# Patient Record
Sex: FEMALE | Race: WHITE | Employment: UNEMPLOYED | URBAN - METROPOLITAN AREA
[De-identification: names, ages, dates, MRNs, and addresses within clinical notes are randomized per-mention and may not be internally consistent; named-entity substitution may affect disease eponyms.]

---

## 2017-01-19 ENCOUNTER — OFFICE VISIT (OUTPATIENT)
Dept: FAMILY MEDICINE CLINIC | Age: 42
End: 2017-01-19

## 2017-01-19 ENCOUNTER — HOSPITAL ENCOUNTER (OUTPATIENT)
Dept: MAMMOGRAPHY | Age: 42
Discharge: HOME | End: 2017-01-19
Attending: FAMILY MEDICINE | Admitting: FAMILY MEDICINE

## 2017-01-19 VITALS
WEIGHT: 226.2 LBS | RESPIRATION RATE: 20 BRPM | DIASTOLIC BLOOD PRESSURE: 80 MMHG | BODY MASS INDEX: 35.43 KG/M2 | SYSTOLIC BLOOD PRESSURE: 110 MMHG | HEART RATE: 78 BPM | TEMPERATURE: 99.3 F

## 2017-01-19 DIAGNOSIS — F43.22 ADJUSTMENT DISORDER WITH ANXIETY: Primary | ICD-10-CM

## 2017-01-19 DIAGNOSIS — F51.04 PSYCHOPHYSIOLOGICAL INSOMNIA: ICD-10-CM

## 2017-01-19 DIAGNOSIS — Z12.39 BREAST CANCER SCREENING: ICD-10-CM

## 2017-01-19 DIAGNOSIS — Z72.0 TOBACCO USE: ICD-10-CM

## 2017-01-19 DIAGNOSIS — Z12.31 VISIT FOR SCREENING MAMMOGRAM: ICD-10-CM

## 2017-01-19 PROCEDURE — 99213 OFFICE O/P EST LOW 20 MIN: CPT | Performed by: FAMILY MEDICINE

## 2017-01-19 RX ORDER — CLONIDINE HYDROCHLORIDE 0.2 MG/1
TABLET ORAL
Qty: 90 TABLET | Refills: 3 | Status: SHIPPED | OUTPATIENT
Start: 2017-01-19 | End: 2017-04-24 | Stop reason: DRUGHIGH

## 2017-01-19 RX ORDER — BUPROPION HYDROCHLORIDE 300 MG/1
300 TABLET ORAL EVERY MORNING
Qty: 90 TABLET | Refills: 3 | Status: SHIPPED | OUTPATIENT
Start: 2017-01-19 | End: 2017-10-23 | Stop reason: DRUGHIGH

## 2017-01-19 RX ORDER — DIAZEPAM 5 MG/1
TABLET ORAL
Qty: 30 TABLET | Refills: 1 | Status: SHIPPED | OUTPATIENT
Start: 2017-01-19 | End: 2017-04-24 | Stop reason: SDUPTHER

## 2017-02-03 DIAGNOSIS — F51.04 PSYCHOPHYSIOLOGICAL INSOMNIA: ICD-10-CM

## 2017-02-07 RX ORDER — ZALEPLON 10 MG/1
CAPSULE ORAL
Qty: 30 CAPSULE | Refills: 1 | Status: SHIPPED | OUTPATIENT
Start: 2017-02-07 | End: 2017-03-30 | Stop reason: SDUPTHER

## 2017-03-30 DIAGNOSIS — F51.04 PSYCHOPHYSIOLOGICAL INSOMNIA: ICD-10-CM

## 2017-03-31 RX ORDER — ZALEPLON 10 MG/1
CAPSULE ORAL
Qty: 30 CAPSULE | Refills: 0 | Status: SHIPPED | OUTPATIENT
Start: 2017-03-31 | End: 2017-07-24 | Stop reason: ALTCHOICE

## 2017-04-17 ENCOUNTER — OFFICE VISIT (OUTPATIENT)
Dept: FAMILY MEDICINE CLINIC | Age: 42
End: 2017-04-17

## 2017-04-17 VITALS
HEART RATE: 77 BPM | WEIGHT: 220.4 LBS | RESPIRATION RATE: 18 BRPM | DIASTOLIC BLOOD PRESSURE: 98 MMHG | BODY MASS INDEX: 34.52 KG/M2 | SYSTOLIC BLOOD PRESSURE: 139 MMHG | TEMPERATURE: 99.3 F

## 2017-04-17 DIAGNOSIS — J30.1 SEASONAL ALLERGIC RHINITIS DUE TO POLLEN: Primary | ICD-10-CM

## 2017-04-17 PROCEDURE — 99213 OFFICE O/P EST LOW 20 MIN: CPT | Performed by: FAMILY MEDICINE

## 2017-04-17 RX ORDER — MELOXICAM 15 MG/1
15 TABLET ORAL DAILY
COMMUNITY
End: 2017-07-10 | Stop reason: SDUPTHER

## 2017-04-17 RX ORDER — TRIAMCINOLONE ACETONIDE 55 UG/1
2 SPRAY, METERED NASAL 2 TIMES DAILY
Qty: 1 INHALER | Refills: 5 | Status: SHIPPED | OUTPATIENT
Start: 2017-04-17 | End: 2017-07-24 | Stop reason: ALTCHOICE

## 2017-04-24 ENCOUNTER — OFFICE VISIT (OUTPATIENT)
Dept: FAMILY MEDICINE CLINIC | Age: 42
End: 2017-04-24

## 2017-04-24 VITALS
DIASTOLIC BLOOD PRESSURE: 113 MMHG | OXYGEN SATURATION: 96 % | RESPIRATION RATE: 12 BRPM | TEMPERATURE: 99.1 F | SYSTOLIC BLOOD PRESSURE: 153 MMHG | HEART RATE: 83 BPM

## 2017-04-24 DIAGNOSIS — D22.71: ICD-10-CM

## 2017-04-24 DIAGNOSIS — Z72.0 TOBACCO USE: ICD-10-CM

## 2017-04-24 DIAGNOSIS — F43.22 ADJUSTMENT DISORDER WITH ANXIETY: Primary | ICD-10-CM

## 2017-04-24 DIAGNOSIS — F51.04 PSYCHOPHYSIOLOGICAL INSOMNIA: ICD-10-CM

## 2017-04-24 PROCEDURE — 99213 OFFICE O/P EST LOW 20 MIN: CPT | Performed by: FAMILY MEDICINE

## 2017-04-24 RX ORDER — CLONIDINE HYDROCHLORIDE 0.3 MG/1
0.3 TABLET ORAL EVERY EVENING
Qty: 30 TABLET | Refills: 5 | Status: SHIPPED | OUTPATIENT
Start: 2017-04-24 | End: 2017-09-29 | Stop reason: SDUPTHER

## 2017-04-24 RX ORDER — ZOLPIDEM TARTRATE 5 MG/1
5 TABLET ORAL NIGHTLY PRN
Qty: 30 TABLET | Refills: 2 | Status: SHIPPED | OUTPATIENT
Start: 2017-04-24 | End: 2017-05-17 | Stop reason: SDUPTHER

## 2017-04-24 RX ORDER — DIAZEPAM 5 MG/1
TABLET ORAL
Qty: 30 TABLET | Refills: 2 | Status: SHIPPED | OUTPATIENT
Start: 2017-04-24 | End: 2017-07-24 | Stop reason: SDUPTHER

## 2017-04-24 ASSESSMENT — PATIENT HEALTH QUESTIONNAIRE - PHQ9
SUM OF ALL RESPONSES TO PHQ9 QUESTIONS 1 & 2: 0
1. LITTLE INTEREST OR PLEASURE IN DOING THINGS: 0
SUM OF ALL RESPONSES TO PHQ QUESTIONS 1-9: 0
2. FEELING DOWN, DEPRESSED OR HOPELESS: 0

## 2017-04-26 PROBLEM — F43.22 ADJUSTMENT DISORDER WITH ANXIETY: Status: ACTIVE | Noted: 2017-04-26

## 2017-05-02 ENCOUNTER — TELEPHONE (OUTPATIENT)
Dept: FAMILY MEDICINE CLINIC | Age: 42
End: 2017-05-02

## 2017-05-17 DIAGNOSIS — F51.04 PSYCHOPHYSIOLOGICAL INSOMNIA: ICD-10-CM

## 2017-05-18 RX ORDER — ZOLPIDEM TARTRATE 5 MG/1
5 TABLET ORAL NIGHTLY PRN
Qty: 60 TABLET | Refills: 2 | Status: SHIPPED | OUTPATIENT
Start: 2017-05-18 | End: 2017-06-01

## 2017-06-13 ENCOUNTER — TELEPHONE (OUTPATIENT)
Dept: FAMILY MEDICINE CLINIC | Age: 42
End: 2017-06-13

## 2017-07-10 DIAGNOSIS — G89.29 CHRONIC BILATERAL LOW BACK PAIN WITHOUT SCIATICA: ICD-10-CM

## 2017-07-10 DIAGNOSIS — M54.50 CHRONIC BILATERAL LOW BACK PAIN WITHOUT SCIATICA: ICD-10-CM

## 2017-07-10 DIAGNOSIS — M25.552 ARTHRALGIA OF LEFT HIP: ICD-10-CM

## 2017-07-10 RX ORDER — MELOXICAM 15 MG/1
TABLET ORAL
Qty: 30 TABLET | Refills: 4 | Status: SHIPPED | OUTPATIENT
Start: 2017-07-10 | End: 2017-12-07 | Stop reason: SDUPTHER

## 2017-07-24 ENCOUNTER — OFFICE VISIT (OUTPATIENT)
Dept: FAMILY MEDICINE CLINIC | Age: 42
End: 2017-07-24

## 2017-07-24 VITALS
WEIGHT: 212.4 LBS | DIASTOLIC BLOOD PRESSURE: 83 MMHG | BODY MASS INDEX: 32.19 KG/M2 | SYSTOLIC BLOOD PRESSURE: 126 MMHG | TEMPERATURE: 98.1 F | RESPIRATION RATE: 14 BRPM | HEIGHT: 68 IN | HEART RATE: 79 BPM

## 2017-07-24 DIAGNOSIS — F43.22 ADJUSTMENT DISORDER WITH ANXIETY: ICD-10-CM

## 2017-07-24 DIAGNOSIS — F51.04 PSYCHOPHYSIOLOGICAL INSOMNIA: ICD-10-CM

## 2017-07-24 PROCEDURE — 99213 OFFICE O/P EST LOW 20 MIN: CPT | Performed by: FAMILY MEDICINE

## 2017-07-24 RX ORDER — DIAZEPAM 5 MG/1
TABLET ORAL
Qty: 30 TABLET | Refills: 2 | Status: SHIPPED | OUTPATIENT
Start: 2017-07-24 | End: 2017-10-23 | Stop reason: SDUPTHER

## 2017-07-24 RX ORDER — ZOLPIDEM TARTRATE 5 MG/1
5 TABLET ORAL NIGHTLY PRN
Qty: 60 TABLET | Refills: 2 | Status: SHIPPED | OUTPATIENT
Start: 2017-07-24 | End: 2017-10-23 | Stop reason: SDUPTHER

## 2017-07-24 RX ORDER — ZOLPIDEM TARTRATE 5 MG/1
5 TABLET ORAL NIGHTLY PRN
COMMUNITY
End: 2017-07-24 | Stop reason: SDUPTHER

## 2017-09-29 DIAGNOSIS — F43.22 ADJUSTMENT DISORDER WITH ANXIETY: ICD-10-CM

## 2017-09-29 RX ORDER — CLONIDINE HYDROCHLORIDE 0.3 MG/1
0.3 TABLET ORAL EVERY EVENING
Qty: 30 TABLET | Refills: 5 | Status: SHIPPED | OUTPATIENT
Start: 2017-09-29 | End: 2018-01-17 | Stop reason: SDUPTHER

## 2017-10-23 ENCOUNTER — OFFICE VISIT (OUTPATIENT)
Dept: FAMILY MEDICINE CLINIC | Age: 42
End: 2017-10-23

## 2017-10-23 VITALS
DIASTOLIC BLOOD PRESSURE: 101 MMHG | TEMPERATURE: 98.2 F | BODY MASS INDEX: 32.47 KG/M2 | SYSTOLIC BLOOD PRESSURE: 142 MMHG | HEART RATE: 70 BPM | WEIGHT: 210.4 LBS | RESPIRATION RATE: 16 BRPM

## 2017-10-23 DIAGNOSIS — F51.04 PSYCHOPHYSIOLOGICAL INSOMNIA: ICD-10-CM

## 2017-10-23 DIAGNOSIS — Z72.0 TOBACCO USE: ICD-10-CM

## 2017-10-23 DIAGNOSIS — F43.22 ADJUSTMENT DISORDER WITH ANXIETY: ICD-10-CM

## 2017-10-23 PROCEDURE — 99214 OFFICE O/P EST MOD 30 MIN: CPT | Performed by: FAMILY MEDICINE

## 2017-10-23 RX ORDER — BUPROPION HYDROCHLORIDE 150 MG/1
150 TABLET ORAL EVERY MORNING
Qty: 30 TABLET | Refills: 3 | Status: SHIPPED | OUTPATIENT
Start: 2017-10-23 | End: 2018-01-17 | Stop reason: SINTOL

## 2017-10-23 RX ORDER — DIAZEPAM 5 MG/1
TABLET ORAL
Qty: 30 TABLET | Refills: 2 | Status: SHIPPED | OUTPATIENT
Start: 2017-10-23 | End: 2018-01-17 | Stop reason: SDUPTHER

## 2017-10-23 RX ORDER — BUPROPION HYDROCHLORIDE 300 MG/1
TABLET ORAL
Qty: 90 TABLET | Refills: 3 | Status: SHIPPED | OUTPATIENT
Start: 2017-10-23 | End: 2018-04-23 | Stop reason: SDUPTHER

## 2017-10-23 RX ORDER — ZOLPIDEM TARTRATE 5 MG/1
5 TABLET ORAL NIGHTLY PRN
Qty: 60 TABLET | Refills: 2 | Status: SHIPPED | OUTPATIENT
Start: 2017-10-23 | End: 2018-01-17 | Stop reason: SDUPTHER

## 2017-10-23 RX ORDER — BUPROPION HYDROCHLORIDE 450 MG/1
TABLET, FILM COATED, EXTENDED RELEASE ORAL
Qty: 90 TABLET | Refills: 1 | Status: SHIPPED | OUTPATIENT
Start: 2017-10-23 | End: 2018-04-23

## 2017-10-23 RX ORDER — BUPROPION HYDROCHLORIDE 300 MG/1
300 TABLET ORAL EVERY MORNING
Qty: 90 TABLET | Refills: 3 | Status: CANCELLED | OUTPATIENT
Start: 2017-10-23

## 2017-10-28 NOTE — PROGRESS NOTES
[facility]  Progress Note  Luzma Dunham DO Rosa Rosario  70/0/8510    10/27/17    Chief Complaint:   Rosa Rosario is a 42 y.o. female who is here for ADD and anxiety and insomnia. HPI:   Doing well with the medication. Finds that diazepam and wellbutrin is helping wit the anxiety. Denies side effects on dizepam.  Has been taking zolpidem in past helpful for diazepam.    Is in contemplation stage. She hopes to cut back on her smoking on the 450 mg dose of Wellbutrin. ROS negative for headache, vision changes, chest pain, shortness of breath, abdominal pain, urinary sx, bowel changes. Past medical, surgical, and social history reviewed. Medications and allergies reviewed. Allergies   Allergen Reactions    Compazine [Prochlorperazine Maleate] Other (See Comments)     Makes her \"crazy\"     Prior to Visit Medications    Medication Sig Taking? Authorizing Provider   BuPROPion HCl ER, XL, 450 MG TB24 Take 1 tablet daily. Yes Reynaldo Mckeon DO   buPROPion (WELLBUTRIN XL) 150 MG extended release tablet Take 1 tablet by mouth every morning Yes Reynaldo Mckeon DO   buPROPion (WELLBUTRIN XL) 300 MG extended release tablet Take 150 mg tablet with 300 mg tablet daily. Yes Reynaldo Mckeon DO   zolpidem (AMBIEN) 5 MG tablet Take 1 tablet by mouth nightly as needed for Sleep Yes Reynaldo Mckeon DO   diazepam (VALIUM) 5 MG tablet Take 1 tab po daily prn anxiety.  Yes Reynaldo Mckeon DO   cloNIDine (CATAPRES) 0.3 MG tablet Take 1 tablet by mouth every evening Yes Reynaldo Mckeon DO   meloxicam (MOBIC) 15 MG tablet TAKE ONE TABLET BY MOUTH DAILY Yes Reynaldo Mckeon DO   Lansoprazole (PREVACID PO) Take by mouth Yes Historical Provider, MD   ranitidine (ZANTAC 150 MAXIMUM STRENGTH) 150 MG tablet Take 150 mg by mouth 2 times daily Yes Historical Provider, MD          Vitals:    10/23/17 1826   BP: (!) 142/101   Pulse: 70   Resp: 16

## 2017-12-07 DIAGNOSIS — M54.50 CHRONIC BILATERAL LOW BACK PAIN WITHOUT SCIATICA: ICD-10-CM

## 2017-12-07 DIAGNOSIS — G89.29 CHRONIC BILATERAL LOW BACK PAIN WITHOUT SCIATICA: ICD-10-CM

## 2017-12-07 DIAGNOSIS — M25.552 ARTHRALGIA OF LEFT HIP: ICD-10-CM

## 2017-12-08 RX ORDER — MELOXICAM 15 MG/1
TABLET ORAL
Qty: 30 TABLET | Refills: 3 | Status: SHIPPED | OUTPATIENT
Start: 2017-12-08 | End: 2018-01-17

## 2017-12-15 ENCOUNTER — OFFICE VISIT (OUTPATIENT)
Dept: FAMILY MEDICINE CLINIC | Age: 42
End: 2017-12-15

## 2017-12-15 VITALS
HEIGHT: 68 IN | DIASTOLIC BLOOD PRESSURE: 86 MMHG | RESPIRATION RATE: 20 BRPM | BODY MASS INDEX: 30.86 KG/M2 | WEIGHT: 203.6 LBS | OXYGEN SATURATION: 97 % | SYSTOLIC BLOOD PRESSURE: 115 MMHG | HEART RATE: 86 BPM | TEMPERATURE: 98.3 F

## 2017-12-15 DIAGNOSIS — Z01.818 PREOP EXAMINATION: Primary | ICD-10-CM

## 2017-12-15 DIAGNOSIS — Z87.891 FORMER SMOKER: ICD-10-CM

## 2017-12-15 PROCEDURE — 99241 PR OFFICE CONSULTATION NEW/ESTAB PATIENT 15 MIN: CPT | Performed by: FAMILY MEDICINE

## 2017-12-15 NOTE — PROGRESS NOTES
symptoms have been worsening with time. Conservative therapy: No.    Planned anesthesia: General   Known anesthesia problems: None   Bleeding risk: No recent or remote history of abnormal bleeding  Personal or FH of DVT/PE: No        Patient Active Problem List   Diagnosis    Psychophysiological insomnia    Adjustment disorder with anxiety    Former smoker       Past Medical History:   Diagnosis Date    Osteoarthritis      Past Surgical History:   Procedure Laterality Date    ABDOMINOPLASTY  2013    GASTRIC BAND  2004    LUMBAR DISCECTOMY Bilateral     L 4/5- 2013-     PATELLA FRACTURE SURGERY  2001    TIBIA FRACTURE SURGERY      tibial plateu fx    WRIST SURGERY Left     1997     Family History   Problem Relation Age of Onset    Arthritis Mother      Social History     Social History    Marital status:      Spouse name: N/A    Number of children: N/A    Years of education: N/A     Occupational History    Not on file. Social History Main Topics    Smoking status: Former Smoker     Packs/day: 0.20    Smokeless tobacco: Never Used      Comment: pt. quite a mth ago    Alcohol use Yes      Comment: bottle of wine    Drug use: No    Sexual activity: Not on file     Other Topics Concern    Not on file     Social History Narrative    No narrative on file       Review of Systems  A comprehensive review of systems was negative except for what was noted in the HPI. Physical Exam   Constitutional: She is oriented to person, place, and time. She appears well-developed and well-nourished. No distress. HENT:   Head: Normocephalic and atraumatic. Mouth/Throat: Uvula is midline, oropharynx is clear and moist and mucous membranes are normal.   Eyes: Conjunctivae and EOM are normal. Pupils are equal, round, and reactive to light. Neck: Trachea normal and normal range of motion. Neck supple. No JVD present. Carotid bruit is not present. No mass and no thyromegaly present.    Cardiovascular: Normal rate, regular rhythm, normal heart sounds and intact distal pulses. Exam reveals no gallop and no friction rub. No murmur heard. Pulmonary/Chest: Effort normal and breath sounds normal. No respiratory distress. She has no wheezes. She has no rales. Abdominal: Soft. Normal aorta and bowel sounds are normal. She exhibits no distension and no mass. There is no hepatosplenomegaly. No tenderness. Musculoskeletal: She exhibits no edema and no tenderness. Neurological: She is alert and oriented to person, place, and time. She has normal strength. No cranial nerve deficit or sensory deficit. Coordination and gait normal.   Skin: Skin is warm and dry. No rash noted. No erythema. Psychiatric: She has a normal mood and affect. Her behavior is normal.          Assessment:       36 y.o. patient with planned surgery as above. Known risk factors for perioperative complications: None  Current medications which may produce withdrawal symptoms if withheld perioperatively: none      Plan:     1. Preoperative workup as follows: none  2. Change in medication regimen before surgery: None  3. Prophylaxis for cardiac events with perioperative beta-blockers: Not indicated  ACC/AHA indications for pre-operative beta-blocker use:    · Vascular surgery with history of postitive stress test  · Intermediate or high risk surgery with history of CAD   · Intermediate or high risk surgery with multiple clinical predictors of CAD- 2 of the following: history of compensated or prior heart failure, history of cerebrovascular disease, DM, or renal insufficiency    Routine administration of higher-dose, long-acting metoprolol in beta-blockernaïve patients on the day of surgery, and in the absence of dose titration is associated with an overall increase in mortality.   Beta-blockers should be started days to weeks prior to surgery and titrated to pulse < 70.  4. Deep vein thrombosis prophylaxis: regimen to be chosen by surgical team  5. No contraindications to planned surgery. 6. Patient given [facility] form with attached pertinent medication list.  Office will fax that form along with this preop.

## 2017-12-18 ENCOUNTER — TELEPHONE (OUTPATIENT)
Dept: FAMILY MEDICINE CLINIC | Age: 42
End: 2017-12-18

## 2017-12-18 NOTE — TELEPHONE ENCOUNTER
hospitals called stating she was in the office on Friday 12/15/2017 for a pre-op visit. She was just informed by Dr. Gigi Veronica office that [employer] will not cover her surgery because the referral did not come from her PCP. Her surgery is scheduled for this Wednesday 12/20/2017. They will need a doctor to doctor referral and a  referral as soon as possible. Please advise. Thanks. hospitals [phone] (home) , [phone]    Dr. Katy Mcnamara fax [phone]    Specialist name: Dr. Giovanna Ivey      Date of appointment: 12/20/2017  Reason for referral: Vertical BBg surgery     Diagnosis:  Insurance Name: [employer] ID#: [id number]  Insurance Group #   Procedure Code:  Specialist NPI#  Specialist Tax ID#          Referrals require 7-10 business days notice for completion. It is the patient's responsibility to ensure the provider is in-network with their insurance company. If an insurance referral is required, authorization must be complete before the appointment or patient may be responsible for the bill.

## 2017-12-19 NOTE — TELEPHONE ENCOUNTER
[facility] Surgery. Please inform pt and Dr Humera Lai office. See attached document.  12/18/17

## 2018-01-17 ENCOUNTER — OFFICE VISIT (OUTPATIENT)
Dept: FAMILY MEDICINE CLINIC | Age: 43
End: 2018-01-17

## 2018-01-17 VITALS
WEIGHT: 189 LBS | HEART RATE: 102 BPM | DIASTOLIC BLOOD PRESSURE: 72 MMHG | SYSTOLIC BLOOD PRESSURE: 110 MMHG | OXYGEN SATURATION: 96 % | BODY MASS INDEX: 29.16 KG/M2

## 2018-01-17 DIAGNOSIS — F51.04 PSYCHOPHYSIOLOGICAL INSOMNIA: ICD-10-CM

## 2018-01-17 DIAGNOSIS — F43.22 ADJUSTMENT DISORDER WITH ANXIETY: ICD-10-CM

## 2018-01-17 DIAGNOSIS — K21.9 GASTROESOPHAGEAL REFLUX DISEASE, ESOPHAGITIS PRESENCE NOT SPECIFIED: Primary | ICD-10-CM

## 2018-01-17 PROCEDURE — 99214 OFFICE O/P EST MOD 30 MIN: CPT | Performed by: FAMILY MEDICINE

## 2018-01-17 RX ORDER — DIAZEPAM 5 MG/1
TABLET ORAL
Qty: 30 TABLET | Refills: 2 | Status: SHIPPED | OUTPATIENT
Start: 2018-01-17 | End: 2018-01-19 | Stop reason: SDUPTHER

## 2018-01-17 RX ORDER — RANITIDINE 150 MG/1
150 TABLET ORAL 2 TIMES DAILY
Qty: 60 TABLET | Refills: 2 | Status: SHIPPED | OUTPATIENT
Start: 2018-01-17 | End: 2018-04-23 | Stop reason: SDUPTHER

## 2018-01-17 RX ORDER — CLONIDINE HYDROCHLORIDE 0.3 MG/1
0.3 TABLET ORAL EVERY EVENING
Qty: 30 TABLET | Refills: 5 | Status: SHIPPED | OUTPATIENT
Start: 2018-01-17 | End: 2018-04-23 | Stop reason: SDUPTHER

## 2018-01-17 RX ORDER — ZOLPIDEM TARTRATE 5 MG/1
TABLET ORAL
Qty: 60 TABLET | Refills: 2 | Status: SHIPPED | OUTPATIENT
Start: 2018-01-17 | End: 2018-04-23 | Stop reason: SDUPTHER

## 2018-01-19 DIAGNOSIS — F43.22 ADJUSTMENT DISORDER WITH ANXIETY: ICD-10-CM

## 2018-01-19 RX ORDER — DIAZEPAM 5 MG/1
TABLET ORAL
Qty: 60 TABLET | Refills: 2 | Status: SHIPPED | OUTPATIENT
Start: 2018-01-19 | End: 2018-04-23 | Stop reason: SDUPTHER

## 2018-01-19 NOTE — TELEPHONE ENCOUNTER
Pharmacy called, stating that they cannot fill pt's script of diazepam (VALIUM) 5 MG tablet from today 1/19/18. Patient's refill states filling TOO SOON, even if patient were taking twice daily she should not be out yet. There currently aren't any notes in the encounter from PCP's visit on 1/17, so we could not clarify. They are requesting a return call to the pharmacy at [phone] for clarification. Thank you!

## 2018-01-21 NOTE — PROGRESS NOTES
hospitals \"Tavia\" was accompanying her daughter for her daughter's appointment when she asked if she can change the diazepam 5 mg from daily to BID since this helps with her anxiety and nausea control better than once a day. Prescription monitoring report was reviewed on 01/18/18 and activity was consistent. Will change script and e-prescribe. The risks, benefits, potential side effects and barriers to medication use were addressed today. Understanding was acknowledged. Patient asked to follow-up if condition(s) do not improve as anticipated. Pt aware of need for every 3 month medication followup appointments, and that medication refills for benzodiazepines, narcotics and/or stimulants will only be given at appointment. If improved, then change med agreement.

## 2018-01-23 NOTE — PROGRESS NOTES
3 Encounters:   01/17/18 189 lb (85.7 kg)   12/15/17 203 lb 9.6 oz (92.4 kg)   10/23/17 210 lb 6.4 oz (95.4 kg)     BP Readings from Last 3 Encounters:   01/17/18 110/72   12/15/17 115/86   10/23/17 (!) 142/101       Patient Active Problem List   Diagnosis    Psychophysiological insomnia    Adjustment disorder with anxiety    Former smoker         There is no immunization history on file for this patient. Past Medical History:   Diagnosis Date    Osteoarthritis      Past Surgical History:   Procedure Laterality Date    ABDOMINOPLASTY  2013    GASTRIC BAND  2004    LUMBAR DISCECTOMY Bilateral     L 4/5- 2013-     PATELLA FRACTURE SURGERY  2001    TIBIA FRACTURE SURGERY      tibial plateu fx    WRIST SURGERY Left     1997     Family History   Problem Relation Age of Onset    Arthritis Mother      Social History     Social History    Marital status:      Spouse name: N/A    Number of children: N/A    Years of education: N/A     Occupational History    Not on file. Social History Main Topics    Smoking status: Former Smoker     Packs/day: 0.20    Smokeless tobacco: Never Used      Comment: pt. quite a mth ago    Alcohol use Yes      Comment: bottle of wine    Drug use: No    Sexual activity: Not on file     Other Topics Concern    Not on file     Social History Narrative    No narrative on file       O: /72   Pulse 102   Wt 189 lb (85.7 kg)   SpO2 96%   Breastfeeding? No   BMI 29.16 kg/m²   Physical Exam  GEN: No acute distress, cooperative, well nourished, alert. HEENT: PEERLA, EOMI , normocephalic/atraumatic, nares and oropharynx clear. Mucus membranes normal, Tympanic membranes clear bilaterally. Neck: soft, supple, no thyromegaly, mass, no Lymphadenopathy  CV: Regular rate and rhythm, no murmur, rubs, gallops. No edema. Resp: Clear to auscultation bilaterally good air entry bilaterally  No crackles, wheeze. Breathing comfortably. Psych: normal affect.   Neuro: AOx3      ASSESSMENT  1. Gastroesophageal reflux disease, esophagitis presence not specified  ranitidine (ZANTAC 150 MAXIMUM STRENGTH) 150 MG tablet   2. Psychophysiological insomnia  zolpidem (AMBIEN) 5 MG tablet   3. Adjustment disorder with anxiety  cloNIDine (CATAPRES) 0.3 MG tablet    DISCONTINUED: diazepam (VALIUM) 5 MG tablet     #1,2,3:  The current medical regimen is effective;  continue present plan and medications. Pt aware of need for every 3 month medication followup appointments, and that medication refills for benzodiazepines, narcotics and/or stimulants will only be given at appointment. PLAN          See rest of plan under patient instructions. Return in about 3 months (around 4/17/2018). There are no Patient Instructions on file for this visit. Please note a portion of this chart was generated using dragon dictation software. Although every effort was made to ensure the accuracy of this automated transcription, some errors in transcription may have occurred.

## 2018-04-23 ENCOUNTER — OFFICE VISIT (OUTPATIENT)
Dept: FAMILY MEDICINE CLINIC | Age: 43
End: 2018-04-23

## 2018-04-23 VITALS
HEART RATE: 67 BPM | RESPIRATION RATE: 16 BRPM | DIASTOLIC BLOOD PRESSURE: 88 MMHG | OXYGEN SATURATION: 99 % | WEIGHT: 193 LBS | BODY MASS INDEX: 29.78 KG/M2 | SYSTOLIC BLOOD PRESSURE: 122 MMHG

## 2018-04-23 DIAGNOSIS — Z72.0 TOBACCO USE: ICD-10-CM

## 2018-04-23 DIAGNOSIS — F51.04 PSYCHOPHYSIOLOGICAL INSOMNIA: ICD-10-CM

## 2018-04-23 DIAGNOSIS — F43.22 ADJUSTMENT DISORDER WITH ANXIETY: Primary | ICD-10-CM

## 2018-04-23 DIAGNOSIS — Z13.1 DIABETES MELLITUS SCREENING: ICD-10-CM

## 2018-04-23 DIAGNOSIS — Z13.29 THYROID DISORDER SCREENING: ICD-10-CM

## 2018-04-23 DIAGNOSIS — Z13.220 LIPID SCREENING: ICD-10-CM

## 2018-04-23 DIAGNOSIS — K21.9 GASTROESOPHAGEAL REFLUX DISEASE, ESOPHAGITIS PRESENCE NOT SPECIFIED: ICD-10-CM

## 2018-04-23 PROCEDURE — 99214 OFFICE O/P EST MOD 30 MIN: CPT | Performed by: FAMILY MEDICINE

## 2018-04-23 RX ORDER — ZOLPIDEM TARTRATE 5 MG/1
TABLET ORAL
Qty: 60 TABLET | Refills: 2 | Status: SHIPPED | OUTPATIENT
Start: 2018-04-23 | End: 2018-07-16 | Stop reason: SDUPTHER

## 2018-04-23 RX ORDER — BUPROPION HYDROCHLORIDE 300 MG/1
TABLET ORAL
Qty: 90 TABLET | Refills: 3 | Status: SHIPPED | OUTPATIENT
Start: 2018-04-23 | End: 2018-10-15 | Stop reason: SDUPTHER

## 2018-04-23 RX ORDER — RANITIDINE 150 MG/1
150 TABLET ORAL 2 TIMES DAILY
Qty: 60 TABLET | Refills: 2 | Status: SHIPPED | OUTPATIENT
Start: 2018-04-23 | End: 2018-10-15 | Stop reason: SDUPTHER

## 2018-04-23 RX ORDER — CLONIDINE HYDROCHLORIDE 0.3 MG/1
0.3 TABLET ORAL EVERY EVENING
Qty: 30 TABLET | Refills: 5 | Status: SHIPPED | OUTPATIENT
Start: 2018-04-23 | End: 2018-10-15 | Stop reason: SDUPTHER

## 2018-04-23 RX ORDER — DIAZEPAM 5 MG/1
TABLET ORAL
Qty: 60 TABLET | Refills: 2 | Status: SHIPPED | OUTPATIENT
Start: 2018-04-23 | End: 2018-07-16 | Stop reason: SDUPTHER

## 2018-07-05 DIAGNOSIS — M25.552 ARTHRALGIA OF LEFT HIP: ICD-10-CM

## 2018-07-05 DIAGNOSIS — G89.29 CHRONIC BILATERAL LOW BACK PAIN WITHOUT SCIATICA: ICD-10-CM

## 2018-07-05 DIAGNOSIS — M54.50 CHRONIC BILATERAL LOW BACK PAIN WITHOUT SCIATICA: ICD-10-CM

## 2018-07-06 RX ORDER — MELOXICAM 15 MG/1
TABLET ORAL
Qty: 30 TABLET | Refills: 2 | Status: SHIPPED | OUTPATIENT
Start: 2018-07-06 | End: 2018-10-15 | Stop reason: SDUPTHER

## 2018-07-16 ENCOUNTER — OFFICE VISIT (OUTPATIENT)
Dept: FAMILY MEDICINE CLINIC | Age: 43
End: 2018-07-16

## 2018-07-16 VITALS
DIASTOLIC BLOOD PRESSURE: 88 MMHG | WEIGHT: 187 LBS | SYSTOLIC BLOOD PRESSURE: 122 MMHG | RESPIRATION RATE: 12 BRPM | BODY MASS INDEX: 28.86 KG/M2 | TEMPERATURE: 98.3 F | OXYGEN SATURATION: 97 % | HEART RATE: 82 BPM

## 2018-07-16 DIAGNOSIS — F51.04 PSYCHOPHYSIOLOGICAL INSOMNIA: ICD-10-CM

## 2018-07-16 DIAGNOSIS — F43.22 ADJUSTMENT DISORDER WITH ANXIETY: ICD-10-CM

## 2018-07-16 PROCEDURE — 99213 OFFICE O/P EST LOW 20 MIN: CPT | Performed by: FAMILY MEDICINE

## 2018-07-16 RX ORDER — ZOLPIDEM TARTRATE 5 MG/1
TABLET ORAL
Qty: 60 TABLET | Refills: 2 | Status: SHIPPED | OUTPATIENT
Start: 2018-07-16 | End: 2018-08-15

## 2018-07-16 RX ORDER — DIAZEPAM 5 MG/1
5 TABLET ORAL EVERY 6 HOURS PRN
COMMUNITY
End: 2018-10-15 | Stop reason: SDUPTHER

## 2018-07-16 RX ORDER — DIAZEPAM 5 MG/1
TABLET ORAL
Qty: 60 TABLET | Refills: 2 | Status: SHIPPED | OUTPATIENT
Start: 2018-07-16 | End: 2018-08-15

## 2018-07-16 RX ORDER — OMEPRAZOLE 20 MG/1
20 CAPSULE, DELAYED RELEASE ORAL DAILY
COMMUNITY
End: 2018-07-17 | Stop reason: SDUPTHER

## 2018-07-16 RX ORDER — ZOLPIDEM TARTRATE 5 MG/1
5 TABLET ORAL NIGHTLY PRN
COMMUNITY
End: 2018-10-15 | Stop reason: SDUPTHER

## 2018-07-16 ASSESSMENT — PATIENT HEALTH QUESTIONNAIRE - PHQ9
SUM OF ALL RESPONSES TO PHQ9 QUESTIONS 1 & 2: 0
SUM OF ALL RESPONSES TO PHQ QUESTIONS 1-9: 0
2. FEELING DOWN, DEPRESSED OR HOPELESS: 0
1. LITTLE INTEREST OR PLEASURE IN DOING THINGS: 0

## 2018-07-17 ENCOUNTER — TELEPHONE (OUTPATIENT)
Dept: FAMILY MEDICINE CLINIC | Age: 43
End: 2018-07-17

## 2018-07-17 DIAGNOSIS — K21.9 GASTROESOPHAGEAL REFLUX DISEASE, ESOPHAGITIS PRESENCE NOT SPECIFIED: Primary | ICD-10-CM

## 2018-07-17 RX ORDER — OMEPRAZOLE 20 MG/1
20 CAPSULE, DELAYED RELEASE ORAL DAILY
Qty: 30 CAPSULE | Refills: 5 | Status: SHIPPED | OUTPATIENT
Start: 2018-07-17 | End: 2018-10-15 | Stop reason: SDUPTHER

## 2018-07-17 NOTE — TELEPHONE ENCOUNTER
[employer] requesting refill    omeprazole (PRILOSEC) 20 MG delayed release capsule [[id number]]   Order Details   Dose: 20 mg Route: Oral Frequency: DAILY  Dispense Quantity:  -- Refills:  -- Fills remaining:  --         Sig: Take 20 mg by mouth daily        Written Date:  -- Expiration Date:  -- Ordering Date:  07/16/18   Start Date:  -- End Date:  --    Ordering Provider:  -- Authorizing Provider:  Historical Provider, MD Ordering User:  Cuco Murillo:  [facility] [address] [phone]

## 2018-07-19 NOTE — PROGRESS NOTES
Psych: normal affect. Neuro: AOx3      ASSESSMENT   Diagnosis Orders   1. Psychophysiological insomnia  zolpidem (AMBIEN) 5 MG tablet   2. Adjustment disorder with anxiety  diazepam (VALIUM) 5 MG tablet     #1 and #2: The risks, benefits, potential side effects and barriers to medication use were addressed today. Understanding was acknowledged. Patient asked to follow-up if condition(s) do not improve as anticipated. The current medical regimen is effective;  continue present plan and medications. Pt aware of need for every 3 month medication followup appointments, and that medication refills for benzodiazepines, narcotics and/or stimulants will only be given at appointment. PLAN          See rest of plan under patient instructions. Return in about 3 months (around 10/16/2018). Patient Instructions   Continue medication for now. If wish to proceed with trial off clonidine, your doctor would consider increasing dose of diazepam from 5 mg to 10 mg. Please note a portion of this chart was generated using dragon dictation software. Although every effort was made to ensure the accuracy of this automated transcription, some errors in transcription may have occurred.

## 2018-10-15 ENCOUNTER — OFFICE VISIT (OUTPATIENT)
Dept: FAMILY MEDICINE CLINIC | Age: 43
End: 2018-10-15
Payer: OTHER GOVERNMENT

## 2018-10-15 VITALS
WEIGHT: 183.6 LBS | TEMPERATURE: 98.3 F | RESPIRATION RATE: 12 BRPM | HEART RATE: 69 BPM | DIASTOLIC BLOOD PRESSURE: 78 MMHG | BODY MASS INDEX: 28.33 KG/M2 | SYSTOLIC BLOOD PRESSURE: 120 MMHG | OXYGEN SATURATION: 98 %

## 2018-10-15 DIAGNOSIS — M25.552 ARTHRALGIA OF LEFT HIP: ICD-10-CM

## 2018-10-15 DIAGNOSIS — Z23 NEEDS FLU SHOT: ICD-10-CM

## 2018-10-15 DIAGNOSIS — G89.29 CHRONIC BILATERAL LOW BACK PAIN WITHOUT SCIATICA: Primary | ICD-10-CM

## 2018-10-15 DIAGNOSIS — F43.22 ADJUSTMENT DISORDER WITH ANXIETY: ICD-10-CM

## 2018-10-15 DIAGNOSIS — Z72.0 TOBACCO USE: ICD-10-CM

## 2018-10-15 DIAGNOSIS — F51.04 PSYCHOPHYSIOLOGICAL INSOMNIA: ICD-10-CM

## 2018-10-15 DIAGNOSIS — M54.50 CHRONIC BILATERAL LOW BACK PAIN WITHOUT SCIATICA: Primary | ICD-10-CM

## 2018-10-15 DIAGNOSIS — K21.9 GASTROESOPHAGEAL REFLUX DISEASE, ESOPHAGITIS PRESENCE NOT SPECIFIED: ICD-10-CM

## 2018-10-15 PROCEDURE — 90471 IMMUNIZATION ADMIN: CPT | Performed by: FAMILY MEDICINE

## 2018-10-15 PROCEDURE — 99214 OFFICE O/P EST MOD 30 MIN: CPT | Performed by: FAMILY MEDICINE

## 2018-10-15 PROCEDURE — 90686 IIV4 VACC NO PRSV 0.5 ML IM: CPT | Performed by: FAMILY MEDICINE

## 2018-10-15 RX ORDER — OMEPRAZOLE 20 MG/1
20 CAPSULE, DELAYED RELEASE ORAL DAILY
Qty: 30 CAPSULE | Refills: 5 | Status: SHIPPED | OUTPATIENT
Start: 2018-10-15 | End: 2019-01-14 | Stop reason: SDUPTHER

## 2018-10-15 RX ORDER — MELOXICAM 15 MG/1
TABLET ORAL
Qty: 30 TABLET | Refills: 5 | Status: SHIPPED | OUTPATIENT
Start: 2018-10-15 | End: 2019-01-14 | Stop reason: SDUPTHER

## 2018-10-15 RX ORDER — ZOLPIDEM TARTRATE 5 MG/1
5-10 TABLET ORAL NIGHTLY PRN
Qty: 30 TABLET | Refills: 2 | Status: SHIPPED | OUTPATIENT
Start: 2018-10-15 | End: 2019-01-14 | Stop reason: SDUPTHER

## 2018-10-15 RX ORDER — BUPROPION HYDROCHLORIDE 300 MG/1
TABLET ORAL
Qty: 90 TABLET | Refills: 3 | Status: SHIPPED | OUTPATIENT
Start: 2018-10-15 | End: 2019-01-14 | Stop reason: SDUPTHER

## 2018-10-15 RX ORDER — CLONIDINE HYDROCHLORIDE 0.3 MG/1
0.3 TABLET ORAL EVERY EVENING
Qty: 30 TABLET | Refills: 5 | Status: SHIPPED | OUTPATIENT
Start: 2018-10-15 | End: 2019-01-14 | Stop reason: SDUPTHER

## 2018-10-15 RX ORDER — RANITIDINE 150 MG/1
150 TABLET ORAL 2 TIMES DAILY
Qty: 60 TABLET | Refills: 2 | Status: SHIPPED | OUTPATIENT
Start: 2018-10-15 | End: 2019-01-14 | Stop reason: SDUPTHER

## 2018-10-15 RX ORDER — DIAZEPAM 5 MG/1
5 TABLET ORAL EVERY 6 HOURS PRN
Qty: 60 TABLET | Refills: 2 | Status: SHIPPED | OUTPATIENT
Start: 2018-10-15 | End: 2019-01-14 | Stop reason: SDUPTHER

## 2018-10-15 NOTE — PROGRESS NOTES
1/15/2019). There are no Patient Instructions on file for this visit. Please note a portion of this chart was generated using dragon dictation software. Although every effort was made to ensure the accuracy of this automated transcription,some errors in transcription may have occurred.

## 2019-01-14 ENCOUNTER — OFFICE VISIT (OUTPATIENT)
Dept: FAMILY MEDICINE CLINIC | Age: 44
End: 2019-01-14
Payer: OTHER GOVERNMENT

## 2019-01-14 VITALS
RESPIRATION RATE: 12 BRPM | HEART RATE: 69 BPM | BODY MASS INDEX: 29.04 KG/M2 | DIASTOLIC BLOOD PRESSURE: 86 MMHG | TEMPERATURE: 96.7 F | OXYGEN SATURATION: 95 % | WEIGHT: 188.2 LBS | SYSTOLIC BLOOD PRESSURE: 123 MMHG

## 2019-01-14 DIAGNOSIS — F43.22 ADJUSTMENT DISORDER WITH ANXIETY: ICD-10-CM

## 2019-01-14 DIAGNOSIS — K21.9 GASTROESOPHAGEAL REFLUX DISEASE, ESOPHAGITIS PRESENCE NOT SPECIFIED: ICD-10-CM

## 2019-01-14 DIAGNOSIS — F51.04 PSYCHOPHYSIOLOGICAL INSOMNIA: ICD-10-CM

## 2019-01-14 DIAGNOSIS — M25.552 ARTHRALGIA OF LEFT HIP: ICD-10-CM

## 2019-01-14 DIAGNOSIS — M54.50 CHRONIC BILATERAL LOW BACK PAIN WITHOUT SCIATICA: ICD-10-CM

## 2019-01-14 DIAGNOSIS — Z72.0 TOBACCO USE: ICD-10-CM

## 2019-01-14 DIAGNOSIS — G89.29 CHRONIC BILATERAL LOW BACK PAIN WITHOUT SCIATICA: ICD-10-CM

## 2019-01-14 PROCEDURE — 99214 OFFICE O/P EST MOD 30 MIN: CPT | Performed by: FAMILY MEDICINE

## 2019-01-14 RX ORDER — ZOLPIDEM TARTRATE 5 MG/1
5 TABLET ORAL NIGHTLY PRN
COMMUNITY
End: 2019-04-15 | Stop reason: SDUPTHER

## 2019-01-14 RX ORDER — MELOXICAM 15 MG/1
TABLET ORAL
Qty: 30 TABLET | Refills: 5 | Status: SHIPPED | OUTPATIENT
Start: 2019-01-14 | End: 2019-02-26

## 2019-01-14 RX ORDER — ZOLPIDEM TARTRATE 5 MG/1
5-10 TABLET ORAL NIGHTLY PRN
Qty: 60 TABLET | Refills: 2 | Status: SHIPPED | OUTPATIENT
Start: 2019-01-14 | End: 2019-02-13

## 2019-01-14 RX ORDER — OMEPRAZOLE 20 MG/1
20 CAPSULE, DELAYED RELEASE ORAL DAILY
Qty: 30 CAPSULE | Refills: 5 | Status: SHIPPED | OUTPATIENT
Start: 2019-01-14 | End: 2019-04-15 | Stop reason: SDUPTHER

## 2019-01-14 RX ORDER — RANITIDINE 150 MG/1
150 TABLET ORAL 2 TIMES DAILY
Qty: 60 TABLET | Refills: 5 | Status: SHIPPED | OUTPATIENT
Start: 2019-01-14 | End: 2019-04-15 | Stop reason: SDUPTHER

## 2019-01-14 RX ORDER — DIAZEPAM 5 MG/1
5 TABLET ORAL EVERY 6 HOURS PRN
Qty: 60 TABLET | Refills: 2 | Status: SHIPPED | OUTPATIENT
Start: 2019-01-14 | End: 2019-02-13

## 2019-01-14 RX ORDER — CLONIDINE HYDROCHLORIDE 0.3 MG/1
0.6 TABLET ORAL EVERY EVENING
Qty: 60 TABLET | Refills: 5 | Status: SHIPPED | OUTPATIENT
Start: 2019-01-14 | End: 2019-04-15 | Stop reason: SDUPTHER

## 2019-01-14 RX ORDER — DIAZEPAM 5 MG/1
5 TABLET ORAL EVERY 6 HOURS PRN
COMMUNITY
End: 2019-04-15 | Stop reason: SDUPTHER

## 2019-01-14 RX ORDER — BUPROPION HYDROCHLORIDE 300 MG/1
TABLET ORAL
Qty: 90 TABLET | Refills: 3 | Status: SHIPPED | OUTPATIENT
Start: 2019-01-14 | End: 2019-04-15 | Stop reason: SDUPTHER

## 2019-01-14 RX ORDER — ZOLPIDEM TARTRATE 10 MG/1
TABLET ORAL NIGHTLY PRN
COMMUNITY
End: 2019-01-14 | Stop reason: CLARIF

## 2019-02-26 ENCOUNTER — OFFICE VISIT (OUTPATIENT)
Dept: FAMILY MEDICINE CLINIC | Age: 44
End: 2019-02-26
Payer: OTHER GOVERNMENT

## 2019-02-26 ENCOUNTER — HOSPITAL ENCOUNTER (OUTPATIENT)
Dept: GENERAL RADIOLOGY | Age: 44
Discharge: HOME | End: 2019-02-26
Payer: OTHER GOVERNMENT

## 2019-02-26 ENCOUNTER — HOSPITAL ENCOUNTER (OUTPATIENT)
Age: 44
Discharge: HOME | End: 2019-02-26
Payer: OTHER GOVERNMENT

## 2019-02-26 VITALS
DIASTOLIC BLOOD PRESSURE: 79 MMHG | RESPIRATION RATE: 20 BRPM | WEIGHT: 192 LBS | OXYGEN SATURATION: 98 % | BODY MASS INDEX: 29.63 KG/M2 | SYSTOLIC BLOOD PRESSURE: 118 MMHG | TEMPERATURE: 96.7 F | HEART RATE: 103 BPM

## 2019-02-26 DIAGNOSIS — M51.9 LUMBAR DISC DISEASE: ICD-10-CM

## 2019-02-26 DIAGNOSIS — F43.22 ADJUSTMENT DISORDER WITH ANXIETY: ICD-10-CM

## 2019-02-26 DIAGNOSIS — M25.50 HYPERMOBILITY ARTHRALGIA: ICD-10-CM

## 2019-02-26 DIAGNOSIS — M51.9 LUMBAR DISC DISEASE: Primary | ICD-10-CM

## 2019-02-26 DIAGNOSIS — M25.50 POLYARTHRALGIA: ICD-10-CM

## 2019-02-26 LAB
A/G RATIO: 1.8 (ref 1.1–2.2)
ALBUMIN SERPL-MCNC: 4.2 G/DL (ref 3.4–5)
ALP BLD-CCNC: 57 U/L (ref 40–129)
ALT SERPL-CCNC: 7 U/L (ref 10–40)
ANION GAP SERPL CALCULATED.3IONS-SCNC: 14 MMOL/L (ref 3–16)
AST SERPL-CCNC: 16 U/L (ref 15–37)
BILIRUB SERPL-MCNC: 0.4 MG/DL (ref 0–1)
BUN BLDV-MCNC: 12 MG/DL (ref 7–20)
C-REACTIVE PROTEIN: 0.5 MG/L (ref 0–5.1)
CALCIUM SERPL-MCNC: 9.3 MG/DL (ref 8.3–10.6)
CHLORIDE BLD-SCNC: 103 MMOL/L (ref 99–110)
CHOLESTEROL, TOTAL: 172 MG/DL (ref 0–199)
CO2: 24 MMOL/L (ref 21–32)
CREAT SERPL-MCNC: 0.5 MG/DL (ref 0.6–1.1)
GFR AFRICAN AMERICAN: >60
GFR NON-AFRICAN AMERICAN: >60
GLOBULIN: 2.4 G/DL
GLUCOSE BLD-MCNC: 108 MG/DL (ref 70–99)
HCT VFR BLD CALC: 41.6 % (ref 36–48)
HDLC SERPL-MCNC: 72 MG/DL (ref 40–60)
HEMOGLOBIN: 13.7 G/DL (ref 12–16)
LDL CHOLESTEROL CALCULATED: 78 MG/DL
MCH RBC QN AUTO: 30.5 PG (ref 26–34)
MCHC RBC AUTO-ENTMCNC: 32.9 G/DL (ref 31–36)
MCV RBC AUTO: 92.8 FL (ref 80–100)
PDW BLD-RTO: 13 % (ref 12.4–15.4)
PLATELET # BLD: 191 K/UL (ref 135–450)
PMV BLD AUTO: 9.3 FL (ref 5–10.5)
POTASSIUM SERPL-SCNC: 4.3 MMOL/L (ref 3.5–5.1)
RBC # BLD: 4.48 M/UL (ref 4–5.2)
RHEUMATOID FACTOR: <10 IU/ML
SEDIMENTATION RATE, ERYTHROCYTE: 8 MM/HR (ref 0–20)
SODIUM BLD-SCNC: 141 MMOL/L (ref 136–145)
T4 FREE: 1.3 NG/DL (ref 0.9–1.8)
TOTAL PROTEIN: 6.6 G/DL (ref 6.4–8.2)
TRIGL SERPL-MCNC: 112 MG/DL (ref 0–150)
TSH REFLEX: 1.75 UIU/ML (ref 0.27–4.2)
VLDLC SERPL CALC-MCNC: 22 MG/DL
WBC # BLD: 6.1 K/UL (ref 4–11)

## 2019-02-26 PROCEDURE — 99214 OFFICE O/P EST MOD 30 MIN: CPT | Performed by: FAMILY MEDICINE

## 2019-02-26 PROCEDURE — 72100 X-RAY EXAM L-S SPINE 2/3 VWS: CPT

## 2019-02-26 RX ORDER — MELOXICAM 7.5 MG/1
7.5 TABLET ORAL DAILY
Qty: 90 TABLET | Refills: 1 | Status: SHIPPED | OUTPATIENT
Start: 2019-02-26 | End: 2019-04-15 | Stop reason: SDUPTHER

## 2019-02-27 LAB — ANTI-NUCLEAR ANTIBODY (ANA): NEGATIVE

## 2019-02-28 LAB
CCP IGG ANTIBODIES: 5 UNITS (ref 0–19)
HLA B27: NEGATIVE

## 2019-03-01 ENCOUNTER — TELEPHONE (OUTPATIENT)
Dept: FAMILY MEDICINE CLINIC | Age: 44
End: 2019-03-01

## 2019-03-01 NOTE — TELEPHONE ENCOUNTER
Please inform pt she is APPROVED for PT @ [address] Mandeep Reyes Intern from 2/22/19 to 6/22/19.    13 visitis approved. Document is attached.

## 2019-04-15 ENCOUNTER — OFFICE VISIT (OUTPATIENT)
Dept: FAMILY MEDICINE CLINIC | Age: 44
End: 2019-04-15
Payer: OTHER GOVERNMENT

## 2019-04-15 VITALS
WEIGHT: 199 LBS | HEIGHT: 68 IN | BODY MASS INDEX: 30.16 KG/M2 | RESPIRATION RATE: 16 BRPM | OXYGEN SATURATION: 97 % | HEART RATE: 74 BPM | SYSTOLIC BLOOD PRESSURE: 134 MMHG | DIASTOLIC BLOOD PRESSURE: 88 MMHG

## 2019-04-15 DIAGNOSIS — M25.50 POLYARTHRALGIA: ICD-10-CM

## 2019-04-15 DIAGNOSIS — F51.02 INSOMNIA DUE TO PSYCHOLOGICAL STRESS: ICD-10-CM

## 2019-04-15 DIAGNOSIS — F43.22 ADJUSTMENT DISORDER WITH ANXIETY: Primary | ICD-10-CM

## 2019-04-15 DIAGNOSIS — Z72.0 TOBACCO USE: ICD-10-CM

## 2019-04-15 DIAGNOSIS — K21.9 GASTROESOPHAGEAL REFLUX DISEASE, ESOPHAGITIS PRESENCE NOT SPECIFIED: ICD-10-CM

## 2019-04-15 DIAGNOSIS — F51.04 PSYCHOPHYSIOLOGICAL INSOMNIA: ICD-10-CM

## 2019-04-15 DIAGNOSIS — M25.50 HYPERMOBILITY ARTHRALGIA: ICD-10-CM

## 2019-04-15 DIAGNOSIS — M51.9 LUMBAR DISC DISEASE: ICD-10-CM

## 2019-04-15 PROCEDURE — 99214 OFFICE O/P EST MOD 30 MIN: CPT | Performed by: FAMILY MEDICINE

## 2019-04-15 RX ORDER — MELOXICAM 7.5 MG/1
7.5 TABLET ORAL DAILY
Qty: 90 TABLET | Refills: 1 | Status: SHIPPED | OUTPATIENT
Start: 2019-04-15 | End: 2020-01-14 | Stop reason: SDUPTHER

## 2019-04-15 RX ORDER — CLONIDINE HYDROCHLORIDE 0.3 MG/1
0.6 TABLET ORAL EVERY EVENING
Qty: 60 TABLET | Refills: 5 | Status: SHIPPED | OUTPATIENT
Start: 2019-04-15 | End: 2019-07-15 | Stop reason: SDUPTHER

## 2019-04-15 RX ORDER — OMEPRAZOLE 20 MG/1
20 CAPSULE, DELAYED RELEASE ORAL DAILY
Qty: 30 CAPSULE | Refills: 5 | Status: SHIPPED | OUTPATIENT
Start: 2019-04-15 | End: 2020-01-14 | Stop reason: SDUPTHER

## 2019-04-15 RX ORDER — DIAZEPAM 5 MG/1
5 TABLET ORAL EVERY 6 HOURS PRN
Qty: 60 TABLET | Refills: 2 | Status: SHIPPED | OUTPATIENT
Start: 2019-04-15 | End: 2019-07-15 | Stop reason: SDUPTHER

## 2019-04-15 RX ORDER — BUPROPION HYDROCHLORIDE 300 MG/1
TABLET ORAL
Qty: 90 TABLET | Refills: 3 | Status: SHIPPED | OUTPATIENT
Start: 2019-04-15 | End: 2019-07-15 | Stop reason: SDUPTHER

## 2019-04-15 RX ORDER — ZOLPIDEM TARTRATE 5 MG/1
5-10 TABLET ORAL NIGHTLY PRN
Qty: 60 TABLET | Refills: 5 | Status: SHIPPED | OUTPATIENT
Start: 2019-04-15 | End: 2019-04-15

## 2019-04-15 RX ORDER — RANITIDINE 150 MG/1
150 TABLET ORAL 2 TIMES DAILY
Qty: 60 TABLET | Refills: 5 | Status: SHIPPED | OUTPATIENT
Start: 2019-04-15 | End: 2020-01-14 | Stop reason: SDUPTHER

## 2019-04-15 ASSESSMENT — PATIENT HEALTH QUESTIONNAIRE - PHQ9
2. FEELING DOWN, DEPRESSED OR HOPELESS: 0
1. LITTLE INTEREST OR PLEASURE IN DOING THINGS: 0
SUM OF ALL RESPONSES TO PHQ QUESTIONS 1-9: 0
SUM OF ALL RESPONSES TO PHQ QUESTIONS 1-9: 0
SUM OF ALL RESPONSES TO PHQ9 QUESTIONS 1 & 2: 0

## 2019-04-15 NOTE — PROGRESS NOTES
BP Readings from Last 3 Encounters:   04/15/19 134/88   02/26/19 118/79   01/14/19 123/86       Patient Active Problem List   Diagnosis    Psychophysiological insomnia    Adjustment disorder with anxiety    Former smoker       Immunization History   Administered Date(s) Administered    Influenza, Quadv, 3 yrs and older, IM, PF (Fluzone 3 yrs and older or Afluria 5 yrs and older) 10/15/2018       Past Medical History:   Diagnosis Date    Osteoarthritis      Past Surgical History:   Procedure Laterality Date    ABDOMINOPLASTY  2013    GASTRIC BAND  2004    LUMBAR DISCECTOMY Bilateral     L 4/5- 2013- [address]    PATELLA FRACTURE SURGERY  2001    TIBIA FRACTURE SURGERY      tibial plateu fx    WRIST SURGERY Left     1997     Family History   Problem Relation Age of Onset    Arthritis Mother      Social History     Socioeconomic History    Marital status:      Spouse name: Not on file    Number of children: Not on file    Years of education: Not on file    Highest education level: Not on file   Occupational History    Not on file   Social Needs    Financial resource strain: Not on file    Food insecurity:     Worry: Not on file     Inability: Not on file    Transportation needs:     Medical: Not on file     Non-medical: Not on file   Tobacco Use    Smoking status: Former Smoker     Packs/day: 0.20    Smokeless tobacco: Never Used    Tobacco comment: pt. quite a mth ago   Substance and Sexual Activity    Alcohol use: Yes     Comment: bottle of wine    Drug use: No    Sexual activity: Not on file   Lifestyle    Physical activity:     Days per week: Not on file     Minutes per session: Not on file    Stress: Not on file   Relationships    Social connections:     Talks on phone: Not on file     Gets together: Not on file     Attends Restoration service: Not on file     Active member of club or organization: Not on file     Attends meetings of clubs or organizations: Not on file Relationship status: Not on file    Intimate partner violence:     Fear of current or ex partner: Not on file     Emotionally abused: Not on file     Physically abused: Not on file     Forced sexual activity: Not on file   Other Topics Concern    Not on file   Social History Narrative    Not on file       O: /88 (Site: Left Upper Arm, Position: Sitting, Cuff Size: Medium Adult)   Pulse 74   Resp 16   Ht 5' 7.5\" (1.715 m)   Wt 199 lb (90.3 kg)   SpO2 97%   BMI 30.71 kg/m²   Physical Exam  GEN: No acute distress,cooperative, well nourished, alert. HEENT: PEERLA, EOMI , normocephalic/atraumatic, nares and oropharynx clear. Mucus membranes normal, Tympanic membranes clear bilaterally. Neck: soft, supple, no thyromegaly,mass, no Lymphadenopathy  CV: Regular rate and rhythm, no murmur, rubs, gallops. No edema. Resp: Clear to auscultation bilaterally good air entry bilaterally  No crackles, wheeze. Breathing comfortably. Psych:normal affect. Neuro: AOx3      ASSESSMENT   Diagnosis Orders   1. Adjustment disorder with anxiety  diazepam (VALIUM) 5 MG tablet   2. Insomnia due to psychological stress  zolpidem (AMBIEN) 5 MG tablet     #1-2:  Pt aware of need for every 3 month medication followup appointments, and that medication refills for benzodiazepines, narcotics and/or stimulants will only be given at appointment. The risks, benefits, potential side effects and barriers to medication use were addressed today. Understanding was acknowledged. Patient asked to follow-up if condition(s) do not improve as anticipated. The current medical regimen is effective;  continue present plan and medications. PLAN          See rest of plan under patient instructions. Return in about 3 months (around 7/15/2019). There are no Patient Instructions on file for this visit. Please note a portion of this chart was generated using dragon dictation software.  Although every effort was made to ensure the accuracy of this automated transcription,some errors in transcription may have occurred.

## 2019-04-15 NOTE — LETTER
[address] [facility]  [address] [address]  Phone: [phone]  Fax: Lior Zamudio DO        April 15, 2019     Patient: Shena Taylor   YOB: 1975   Date of Visit: 4/15/2019       To Whom It May Concern: It is my medical opinion that Elena Do may return to work on Tuesday April 16, 2019. She was seen at the clinic today. She has not experienced a concusion. If you have any questions or concerns, please don't hesitate to call.     Sincerely,        Stanley Rivera, DO

## 2019-04-15 NOTE — PATIENT INSTRUCTIONS
1) Keep up a good job keeping the wine consumption down. 2) Continue the diazepam.  3) Continue the clonidine so that you can get good quality sleep.

## 2019-07-15 ENCOUNTER — OFFICE VISIT (OUTPATIENT)
Dept: FAMILY MEDICINE CLINIC | Age: 44
End: 2019-07-15
Payer: OTHER GOVERNMENT

## 2019-07-15 VITALS
HEIGHT: 68 IN | HEART RATE: 78 BPM | BODY MASS INDEX: 29.61 KG/M2 | WEIGHT: 195.4 LBS | DIASTOLIC BLOOD PRESSURE: 89 MMHG | OXYGEN SATURATION: 99 % | SYSTOLIC BLOOD PRESSURE: 128 MMHG

## 2019-07-15 DIAGNOSIS — L21.9 SEBORRHEIC DERMATITIS: ICD-10-CM

## 2019-07-15 DIAGNOSIS — F51.04 PSYCHOPHYSIOLOGICAL INSOMNIA: ICD-10-CM

## 2019-07-15 DIAGNOSIS — Z78.9 ADMITS TO ALCOHOL CONSUMPTION: ICD-10-CM

## 2019-07-15 DIAGNOSIS — F43.22 ADJUSTMENT DISORDER WITH ANXIETY: Primary | ICD-10-CM

## 2019-07-15 PROCEDURE — 99214 OFFICE O/P EST MOD 30 MIN: CPT | Performed by: FAMILY MEDICINE

## 2019-07-15 RX ORDER — BUPROPION HYDROCHLORIDE 300 MG/1
300 TABLET ORAL EVERY MORNING
Qty: 30 TABLET | Refills: 5 | Status: SHIPPED | OUTPATIENT
Start: 2019-07-15 | End: 2019-10-14 | Stop reason: ALTCHOICE

## 2019-07-15 RX ORDER — DIAZEPAM 5 MG/1
5 TABLET ORAL EVERY 6 HOURS PRN
Qty: 60 TABLET | Refills: 2 | Status: SHIPPED | OUTPATIENT
Start: 2019-07-15 | End: 2020-01-14 | Stop reason: SDUPTHER

## 2019-07-15 RX ORDER — KETOCONAZOLE 20 MG/G
CREAM TOPICAL
Qty: 60 G | Refills: 1 | Status: SHIPPED | OUTPATIENT
Start: 2019-07-15 | End: 2021-09-01

## 2019-07-15 RX ORDER — TRIAMCINOLONE ACETONIDE 0.25 MG/G
CREAM TOPICAL
Qty: 80 G | Refills: 1 | Status: SHIPPED | OUTPATIENT
Start: 2019-07-15 | End: 2021-08-13 | Stop reason: ALTCHOICE

## 2019-07-15 RX ORDER — CLONIDINE HYDROCHLORIDE 0.3 MG/1
0.6 TABLET ORAL EVERY EVENING
Qty: 60 TABLET | Refills: 11 | Status: SHIPPED | OUTPATIENT
Start: 2019-07-15 | End: 2019-10-14 | Stop reason: SDUPTHER

## 2019-07-15 NOTE — PROGRESS NOTES
[facility]  Progress Note  Precious QuinnDO Ingrid mcdaniel  [birth year]    07/15/19    Chief Complaint:   Ingrid Syed is a 39 y.o. female who is here for panic attack and insomnia. HPI:   Does well with diazepam.  Takes up to 2/xday. Clonidpine helps with insomnia. Ambien has not helped inp ast.  Still consuming close t o1 bottle of wine. Also complains of flaking of the skin on the nasolabial folds the nose and forehead. The diagnosis of seborrheic dermatitis in the past and uses tea tree shampoo for her scalp    Accompanied by her  who agrees to that [employer] should decrease the amount of wine consumption and finds that when she is not working she consumes up to 1 bottle of wine. ROS negative for headache, visionchanges, chest pain, shortness of breath, abdominal pain, urinary sx, bowel changes. Past medical, surgical, and social history reviewed. and allergies reviewed. Allergies   Allergen Reactions    Compazine [Prochlorperazine Maleate] Other (See Comments)     Makes her \"crazy\"     Prior to Visit Medications    Medication Sig Taking? Authorizing Provider   diazepam (VALIUM) 5 MG tablet Take 1 tablet by mouth every 6 hours as needed for Anxiety or Sleep for up to 30 days. Yes Lina Mckeon, DO   cloNIDine (CATAPRES) 0.3 MG tablet Take 2 tablets by mouth every evening Yes Lina Mckeon, DO   triamcinolone (KENALOG) 0.025 % cream Apply topically 2 times daily. Yes Lina Mckeon, DO   ketoconazole (NIZORAL) 2 % cream Apply topically daily.  Yes Lina Mckeon, DO   buPROPion (WELLBUTRIN XL) 300 MG extended release tablet Take 1 tablet by mouth every morning Yes Lina Mckeon, DO   omeprazole (PRILOSEC) 20 MG delayed release capsule Take 1 capsule by mouth daily Yes Lina Mckeon, DO   ranitidine (ZANTAC 150 MAXIMUM STRENGTH) 150 MG tablet Take 1 tablet by mouth 2 times daily Yes Johnnie Torres, DO ketoconazole (NIZORAL) 2 % cream     #1-2: The risks, benefits, potential side effects and barriers to medication use were addressed today. Understanding was acknowledged. Patient asked to follow-up if condition(s) do not improve as anticipated. Ness Kelly Pt aware of need for every 3 month medication followup appointments, and that medication refills for benzodiazepines, narcotics and/or stimulants will only be given at appointment. The risks, benefits, potential side effects and barriers to medication use were addressed today. Understanding was acknowledged. Patient asked to follow-up if condition(s) do not improve as anticipated. #3: Patient was asked about her current pattern of alcohol consumption, and results of screening for alcohol misuse, performed within the past 12 months, have been consistent with harmful or hazardous drinking, but do not meet criteria for alcohol dependence. Patient's individual health risks associated with alcohol misuse were discussed, as well as the likely benefits of abstinence or cutting down. Based upon patient's motivation to change her behavior, the following plan was agreed upon: limit consumption of alcohol to nor more than 14 drinks/week and 4 drinks per occasion for men, or no more than 7 drinks/week and 3 drinks per occasion for women. Educational materials to reinforce counseling were provided. Patient will follow-up in 3 month(s) with PCP. Provider spent 5 minutes counseling patient. #4: Mix ketoconazole and Kenalog at a 1 to 1 ratio and apply twice daily for up to 2 weeks then to take a 2-week break before    PLAN          If applicable, see additional patient information and instructions under \"Patient Instructions. \"    Return in about 3 months (around 10/15/2019). Patient Instructions     1) Continue to find ways to reduce alcohol intake. 2) You can continue to take diazepam.  3) Continue the clonidine.         Patient Education        Seborrheic Dermatitis: Care Instructions  Your Care Instructions  Seborrheic dermatitis (say \"aip-xes-UPI-ick gkw-cmn-SJ-tus\") is a skin problem that causes a reddish rash with greasy, flaky, yellow skin patches. The rash may appear on many parts of the body. It may be on the scalp, face (especially the eyebrow area and between the nose and mouth), ears, breasts, underarms, and genital area. The flaky skin on the scalp is called dandruff. This rash is often a long-term (chronic) condition. It may last for years. But the symptoms may come and go. Symptoms can be treated with special creams, shampoos, or other skin care. The cause of seborrheic dermatitis is not fully understood. It may occur when skin glands make too much oil. It may get worse in cold weather or with stress. A type of skin fungus, or yeast, may also be linked with this condition. Follow-up care is a key part of your treatment and safety. Be sure to make and go to all appointments, and call your doctor if you are having problems. It's also a good idea to know your test results and keep a list of the medicines you take. How can you care for yourself at home? · If your doctor prescribes a steroid cream, dandruff shampoo, or antifungal cream or medicine, use it as directed. If your doctor prescribes other medicine, take it as directed. · Use a dandruff shampoo if seborrheic dermatitis affects your scalp. This includes Head & Shoulders, Sebulex, and Selsun Blue. You may need to try a few kinds of shampoo to find the one that works best for you. · To help with itching:  ? Use hydrocortisone cream. Follow the directions on the label. ? Use cold, wet cloths. ? Take an over-the-counter antihistamine, such as diphenhydramine (Benadryl) or loratadine (Claritin). Read and follow all instructions on the label. When should you call for help?   Call your doctor now or seek immediate medical care if:    · You have signs of infection, such as:  ? Increased pain,

## 2019-09-29 ENCOUNTER — APPOINTMENT (OUTPATIENT)
Dept: GENERAL RADIOLOGY | Age: 44
End: 2019-09-29
Payer: OTHER GOVERNMENT

## 2019-09-29 ENCOUNTER — HOSPITAL ENCOUNTER (EMERGENCY)
Age: 44
Discharge: HOME | End: 2019-09-29
Attending: EMERGENCY MEDICINE
Payer: OTHER GOVERNMENT

## 2019-09-29 VITALS
SYSTOLIC BLOOD PRESSURE: 128 MMHG | DIASTOLIC BLOOD PRESSURE: 95 MMHG | HEART RATE: 81 BPM | RESPIRATION RATE: 20 BRPM | BODY MASS INDEX: 27.89 KG/M2 | WEIGHT: 184 LBS | TEMPERATURE: 98.1 F | HEIGHT: 68 IN | OXYGEN SATURATION: 98 %

## 2019-09-29 DIAGNOSIS — S90.111A CONTUSION OF RIGHT GREAT TOE WITHOUT DAMAGE TO NAIL, INITIAL ENCOUNTER: ICD-10-CM

## 2019-09-29 DIAGNOSIS — S80.01XA CONTUSION OF RIGHT KNEE, INITIAL ENCOUNTER: Primary | ICD-10-CM

## 2019-09-29 DIAGNOSIS — R03.0 ELEVATED BLOOD PRESSURE READING: ICD-10-CM

## 2019-09-29 PROCEDURE — 99283 EMERGENCY DEPT VISIT LOW MDM: CPT

## 2019-09-29 PROCEDURE — 73660 X-RAY EXAM OF TOE(S): CPT

## 2019-09-29 PROCEDURE — 73562 X-RAY EXAM OF KNEE 3: CPT

## 2019-09-29 RX ORDER — IBUPROFEN 600 MG/1
600 TABLET ORAL EVERY 8 HOURS PRN
Qty: 20 TABLET | Refills: 1 | Status: SHIPPED | OUTPATIENT
Start: 2019-09-29 | End: 2019-10-14 | Stop reason: ALTCHOICE

## 2019-09-29 ASSESSMENT — PAIN SCALES - GENERAL: PAINLEVEL_OUTOF10: 6

## 2019-09-29 ASSESSMENT — PAIN DESCRIPTION - LOCATION: LOCATION: KNEE

## 2019-09-29 ASSESSMENT — PAIN DESCRIPTION - ORIENTATION: ORIENTATION: RIGHT

## 2019-09-29 ASSESSMENT — PAIN DESCRIPTION - PAIN TYPE: TYPE: ACUTE PAIN

## 2019-09-29 ASSESSMENT — PAIN DESCRIPTION - FREQUENCY: FREQUENCY: CONTINUOUS

## 2019-09-29 ASSESSMENT — PAIN DESCRIPTION - DESCRIPTORS: DESCRIPTORS: THROBBING

## 2019-10-14 ENCOUNTER — OFFICE VISIT (OUTPATIENT)
Dept: FAMILY MEDICINE CLINIC | Age: 44
End: 2019-10-14
Payer: OTHER GOVERNMENT

## 2019-10-14 VITALS
DIASTOLIC BLOOD PRESSURE: 85 MMHG | HEIGHT: 68 IN | HEART RATE: 60 BPM | WEIGHT: 196 LBS | BODY MASS INDEX: 29.7 KG/M2 | TEMPERATURE: 98.4 F | SYSTOLIC BLOOD PRESSURE: 115 MMHG | OXYGEN SATURATION: 99 %

## 2019-10-14 DIAGNOSIS — Z23 NEED FOR MMR VACCINE: ICD-10-CM

## 2019-10-14 DIAGNOSIS — Z23 NEED FOR TDAP VACCINATION: ICD-10-CM

## 2019-10-14 DIAGNOSIS — Z23 INFLUENZA VACCINE NEEDED: ICD-10-CM

## 2019-10-14 DIAGNOSIS — F43.22 ADJUSTMENT DISORDER WITH ANXIETY: Primary | ICD-10-CM

## 2019-10-14 PROCEDURE — 90715 TDAP VACCINE 7 YRS/> IM: CPT | Performed by: FAMILY MEDICINE

## 2019-10-14 PROCEDURE — 90686 IIV4 VACC NO PRSV 0.5 ML IM: CPT | Performed by: FAMILY MEDICINE

## 2019-10-14 PROCEDURE — 90472 IMMUNIZATION ADMIN EACH ADD: CPT | Performed by: FAMILY MEDICINE

## 2019-10-14 PROCEDURE — 90471 IMMUNIZATION ADMIN: CPT | Performed by: FAMILY MEDICINE

## 2019-10-14 PROCEDURE — 99214 OFFICE O/P EST MOD 30 MIN: CPT | Performed by: FAMILY MEDICINE

## 2019-10-14 PROCEDURE — 90707 MMR VACCINE SC: CPT | Performed by: FAMILY MEDICINE

## 2019-10-14 RX ORDER — DIAZEPAM 5 MG/1
5 TABLET ORAL EVERY 12 HOURS PRN
Qty: 60 TABLET | Refills: 2 | Status: SHIPPED | OUTPATIENT
Start: 2019-10-14 | End: 2019-11-13

## 2019-10-14 RX ORDER — DIAZEPAM 5 MG/1
TABLET ORAL
COMMUNITY
Start: 2019-09-13 | End: 2019-10-14 | Stop reason: SDUPTHER

## 2019-10-14 RX ORDER — CLONIDINE HYDROCHLORIDE 0.3 MG/1
0.9 TABLET ORAL EVERY EVENING
Qty: 90 TABLET | Refills: 11 | Status: SHIPPED | OUTPATIENT
Start: 2019-10-14 | End: 2020-10-13

## 2019-10-14 RX ORDER — MELOXICAM 15 MG/1
15 TABLET ORAL DAILY PRN
Qty: 14 TABLET | Refills: 0 | Status: SHIPPED | OUTPATIENT
Start: 2019-10-14 | End: 2020-01-14 | Stop reason: ALTCHOICE

## 2020-01-14 ENCOUNTER — OFFICE VISIT (OUTPATIENT)
Dept: FAMILY MEDICINE CLINIC | Age: 45
End: 2020-01-14
Payer: OTHER GOVERNMENT

## 2020-01-14 VITALS
BODY MASS INDEX: 30.79 KG/M2 | TEMPERATURE: 97.4 F | HEART RATE: 62 BPM | OXYGEN SATURATION: 98 % | SYSTOLIC BLOOD PRESSURE: 118 MMHG | DIASTOLIC BLOOD PRESSURE: 88 MMHG | RESPIRATION RATE: 16 BRPM | WEIGHT: 202.5 LBS

## 2020-01-14 DIAGNOSIS — K21.9 GASTROESOPHAGEAL REFLUX DISEASE, ESOPHAGITIS PRESENCE NOT SPECIFIED: ICD-10-CM

## 2020-01-14 DIAGNOSIS — R53.83 OTHER FATIGUE: ICD-10-CM

## 2020-01-14 LAB
A/G RATIO: 1.8 (ref 1.1–2.2)
ALBUMIN SERPL-MCNC: 4.2 G/DL (ref 3.4–5)
ALP BLD-CCNC: 78 U/L (ref 40–129)
ALT SERPL-CCNC: 9 U/L (ref 10–40)
ANION GAP SERPL CALCULATED.3IONS-SCNC: 13 MMOL/L (ref 3–16)
AST SERPL-CCNC: 18 U/L (ref 15–37)
BILIRUB SERPL-MCNC: 0.3 MG/DL (ref 0–1)
BUN BLDV-MCNC: 10 MG/DL (ref 7–20)
CALCIUM SERPL-MCNC: 9.5 MG/DL (ref 8.3–10.6)
CHLORIDE BLD-SCNC: 104 MMOL/L (ref 99–110)
CO2: 24 MMOL/L (ref 21–32)
CREAT SERPL-MCNC: 0.6 MG/DL (ref 0.6–1.1)
GFR AFRICAN AMERICAN: >60
GFR NON-AFRICAN AMERICAN: >60
GLOBULIN: 2.4 G/DL
GLUCOSE BLD-MCNC: 101 MG/DL (ref 70–99)
HCT VFR BLD CALC: 41.6 % (ref 36–48)
HEMOGLOBIN: 13.9 G/DL (ref 12–16)
MCH RBC QN AUTO: 30 PG (ref 26–34)
MCHC RBC AUTO-ENTMCNC: 33.5 G/DL (ref 31–36)
MCV RBC AUTO: 89.7 FL (ref 80–100)
PDW BLD-RTO: 13.4 % (ref 12.4–15.4)
PLATELET # BLD: 209 K/UL (ref 135–450)
PMV BLD AUTO: 9.3 FL (ref 5–10.5)
POTASSIUM SERPL-SCNC: 4.5 MMOL/L (ref 3.5–5.1)
RBC # BLD: 4.64 M/UL (ref 4–5.2)
SODIUM BLD-SCNC: 141 MMOL/L (ref 136–145)
T4 FREE: 1.1 NG/DL (ref 0.9–1.8)
TOTAL PROTEIN: 6.6 G/DL (ref 6.4–8.2)
WBC # BLD: 6.4 K/UL (ref 4–11)

## 2020-01-14 PROCEDURE — 99214 OFFICE O/P EST MOD 30 MIN: CPT | Performed by: FAMILY MEDICINE

## 2020-01-14 RX ORDER — DIAZEPAM 5 MG/1
5 TABLET ORAL EVERY 6 HOURS PRN
Qty: 60 TABLET | Refills: 2 | Status: SHIPPED | OUTPATIENT
Start: 2020-01-14 | End: 2020-04-09 | Stop reason: SDUPTHER

## 2020-01-14 RX ORDER — METHOCARBAMOL 500 MG/1
500 TABLET, FILM COATED ORAL 3 TIMES DAILY PRN
Qty: 15 TABLET | Refills: 0 | Status: SHIPPED | OUTPATIENT
Start: 2020-01-14 | End: 2020-01-19

## 2020-01-14 RX ORDER — OMEPRAZOLE 20 MG/1
20 CAPSULE, DELAYED RELEASE ORAL DAILY
Qty: 90 CAPSULE | Refills: 1 | Status: SHIPPED | OUTPATIENT
Start: 2020-01-14 | End: 2020-07-23 | Stop reason: SDUPTHER

## 2020-01-14 RX ORDER — MELOXICAM 7.5 MG/1
7.5 TABLET ORAL DAILY
Qty: 90 TABLET | Refills: 1 | Status: SHIPPED | OUTPATIENT
Start: 2020-01-14 | End: 2020-07-17

## 2020-01-14 RX ORDER — RANITIDINE 150 MG/1
150 TABLET ORAL 2 TIMES DAILY
Qty: 180 TABLET | Refills: 1 | Status: SHIPPED | OUTPATIENT
Start: 2020-01-14 | End: 2020-07-23

## 2020-01-14 RX ORDER — BUPROPION HYDROCHLORIDE 300 MG/1
300 TABLET ORAL EVERY MORNING
Qty: 30 TABLET | Refills: 5 | Status: SHIPPED | OUTPATIENT
Start: 2020-01-14 | End: 2020-07-23 | Stop reason: SDUPTHER

## 2020-01-14 NOTE — PROGRESS NOTES
202 lb 8 oz (91.9 kg)      Wt Readings from Last 3 Encounters:   01/14/20 202 lb 8 oz (91.9 kg)   10/14/19 196 lb (88.9 kg)   09/29/19 184 lb (83.5 kg)     BP Readings from Last 3 Encounters:   01/14/20 118/88   10/14/19 115/85   09/29/19 (!) 128/95       Patient Active Problem List   Diagnosis    Psychophysiological insomnia    Adjustment disorder with anxiety    Former smoker       Immunization History   Administered Date(s) Administered    Influenza, Quadv, IM, PF (6 mo and older Fluzone, Flulaval, Fluarix, and 3 yrs and older Afluria) 10/15/2018, 10/14/2019    MMR 10/14/2019    Tdap (Boostrix, Adacel) 10/14/2019       Past Medical History:   Diagnosis Date    Osteoarthritis      Past Surgical History:   Procedure Laterality Date    ABDOMINOPLASTY  2013    GASTRIC BAND  2004    LUMBAR DISCECTOMY Bilateral     L 4/5- 2013- [address]  2001    TIBIA FRACTURE SURGERY      tibial plateu fx    WRIST SURGERY Left     1997     Family History   Problem Relation Age of Onset    Arthritis Mother      Social History     Socioeconomic History    Marital status:      Spouse name: Not on file    Number of children: Not on file    Years of education: Not on file    Highest education level: Not on file   Occupational History    Not on file   Social Needs    Financial resource strain: Not on file    Food insecurity:     Worry: Not on file     Inability: Not on file    Transportation needs:     Medical: Not on file     Non-medical: Not on file   Tobacco Use    Smoking status: Former Smoker     Packs/day: 0.20    Smokeless tobacco: Never Used    Tobacco comment: pt. quite a mth ago   Substance and Sexual Activity    Alcohol use: Yes     Comment: bottle of wine    Drug use: No    Sexual activity: Not on file   Lifestyle    Physical activity:     Days per week: Not on file     Minutes per session: Not on file    Stress: Not on file   Relationships    Social connections: of left upper back, initial encounter  methocarbamol (ROBAXIN) 500 MG tablet   8. Anxiety and depression  buPROPion (WELLBUTRIN XL) 300 MG extended release tablet     #1: likely secondary to #8. #2: Pt aware of need for every 3 month medication followup appointments, and that medication refills for benzodiazepines, narcotics and/or stimulants will only be given at appointment. #3: The current medical regimen is effective;  continue present plan and medications. #4-6: The current medical regimen is effective;  continue present plan and medications. #7: The risks, benefits, potential side effects and barriers to medication use were addressed today. Understanding was acknowledged. Patient asked to follow-up if condition(s) do not improve as anticipated. #8: resume Wellbutrin. Printed out coupon. PLAN          If applicable, see additional patient information and instructions under \"Patient Instructions. \"    Return in about 3 months (around 4/14/2020) for Anxiety, Depression. Patient Instructions   1) Back is not getting significantly better by this weekend, your PCP will recommend physical therapy at [address] [place] [address]    2) Get labwork done. NO APPOINTMENT NECESSARY  WE ACCEPT ALL MAJOR INSURANCE PLANS  WE ACCEPT SELF PAYING PATIENTS  CALL [phone] FOR MORE INFORMATION    [facility]  [address], [address]  Phone: [phone] Fax: [phone]  Mon.-Fri. 7 a.m.-5 p.m. Sat. 8 a.m.-[place] [address] [place]  [facility]  Phone: [phone]  Mon.-Fri. 7:30 a.m.-4 p.m. Please note a portion of this chart was generated using dragon dictation software. Although every effort was made to ensure the accuracy of this automated transcription,some errors in transcription may have occurred.

## 2020-04-03 ENCOUNTER — TELEMEDICINE (OUTPATIENT)
Dept: FAMILY MEDICINE CLINIC | Age: 45
End: 2020-04-03

## 2020-04-03 NOTE — PATIENT INSTRUCTIONS
Since we could not connect by virtual video visit or by phone, please use the Assert[employer] E-visit feature to request refills on the medication. Take care.     Dr. Petty Gordon

## 2020-04-09 ENCOUNTER — VIRTUAL VISIT (OUTPATIENT)
Dept: FAMILY MEDICINE CLINIC | Age: 45
End: 2020-04-09
Payer: OTHER GOVERNMENT

## 2020-04-09 PROCEDURE — 99213 OFFICE O/P EST LOW 20 MIN: CPT | Performed by: FAMILY MEDICINE

## 2020-04-09 RX ORDER — DIAZEPAM 5 MG/1
5 TABLET ORAL EVERY 6 HOURS PRN
COMMUNITY
End: 2020-07-23 | Stop reason: SDUPTHER

## 2020-04-09 RX ORDER — DIAZEPAM 5 MG/1
5 TABLET ORAL EVERY 6 HOURS PRN
Qty: 60 TABLET | Refills: 2 | Status: SHIPPED | OUTPATIENT
Start: 2020-04-09 | End: 2020-05-09

## 2020-04-09 NOTE — PROGRESS NOTES
(NIZORAL) 2 % cream Apply topically daily. Yes Sheree Mckeon DO   ranitidine (ZANTAC 150 MAXIMUM STRENGTH) 150 MG tablet Take 1 tablet by mouth 2 times daily  Patient not taking: Reported on 4/9/2020  Sheree Mckeon DO          There were no vitals filed for this visit.    Wt Readings from Last 3 Encounters:   01/14/20 202 lb 8 oz (91.9 kg)   10/14/19 196 lb (88.9 kg)   09/29/19 184 lb (83.5 kg)     BP Readings from Last 3 Encounters:   01/14/20 118/88   10/14/19 115/85   09/29/19 (!) 128/95       Patient Active Problem List   Diagnosis    Psychophysiological insomnia    Adjustment disorder with anxiety    Former smoker       Immunization History   Administered Date(s) Administered    Influenza, Quadv, IM, PF (6 mo and older Fluzone, Flulaval, Fluarix, and 3 yrs and older Afluria) 10/15/2018, 10/14/2019    MMR 10/14/2019    Tdap (Boostrix, Adacel) 10/14/2019       Past Medical History:   Diagnosis Date    Osteoarthritis      Past Surgical History:   Procedure Laterality Date    ABDOMINOPLASTY  2013    GASTRIC BAND  2004    LUMBAR [facility]CTOMY [facility]     [facility] 4[facility]    PATELLA FRACTURE SURGERY  2001    TIBIA FRACTURE SURGERY      tibial plateu fx    WRIST SURGERY Left     1997     Family History   Problem Relation Age of Onset    Arthritis Mother      Social History     Socioeconomic History    Marital status:      Spouse name: Not on file    Number of children: Not on file    Years of education: Not on file    Highest education level: Not on file   Occupational History    Not on file   Social Needs    Financial resource strain: Not on file    Food insecurity     Worry: Not on file     Inability: Not on file    Transportation needs     Medical: Not on file     Non-medical: Not on file   Tobacco Use    Smoking status: Former Smoker     Packs/day: 0.20    Smokeless tobacco: Never Used    Tobacco comment: pt. quite a mth ago   Substance and Sexual Activity   

## 2020-07-17 RX ORDER — MELOXICAM 7.5 MG/1
TABLET ORAL
Qty: 30 TABLET | Refills: 0 | Status: SHIPPED | OUTPATIENT
Start: 2020-07-17 | End: 2020-07-23 | Stop reason: SDUPTHER

## 2020-07-23 ENCOUNTER — VIRTUAL VISIT (OUTPATIENT)
Dept: FAMILY MEDICINE CLINIC | Age: 45
End: 2020-07-23
Payer: OTHER GOVERNMENT

## 2020-07-23 PROCEDURE — 99214 OFFICE O/P EST MOD 30 MIN: CPT | Performed by: FAMILY MEDICINE

## 2020-07-23 RX ORDER — BUPROPION HYDROCHLORIDE 300 MG/1
300 TABLET ORAL EVERY MORNING
Qty: 30 TABLET | Refills: 5 | Status: SHIPPED | OUTPATIENT
Start: 2020-07-23 | End: 2020-07-28

## 2020-07-23 RX ORDER — MELOXICAM 7.5 MG/1
TABLET ORAL
Qty: 30 TABLET | Refills: 5 | Status: SHIPPED | OUTPATIENT
Start: 2020-07-23 | End: 2020-12-23 | Stop reason: SDUPTHER

## 2020-07-23 RX ORDER — DIAZEPAM 5 MG/1
5 TABLET ORAL EVERY 6 HOURS PRN
Qty: 60 TABLET | Refills: 2 | Status: SHIPPED | OUTPATIENT
Start: 2020-07-23 | End: 2020-12-23 | Stop reason: SDUPTHER

## 2020-07-23 RX ORDER — RANITIDINE 150 MG/1
150 TABLET ORAL 2 TIMES DAILY
Qty: 60 TABLET | Refills: 5 | Status: CANCELLED | OUTPATIENT
Start: 2020-07-23

## 2020-07-23 RX ORDER — OMEPRAZOLE 20 MG/1
20 CAPSULE, DELAYED RELEASE ORAL DAILY
Qty: 30 CAPSULE | Refills: 5 | Status: SHIPPED | OUTPATIENT
Start: 2020-07-23 | End: 2020-11-06

## 2020-07-23 NOTE — PROGRESS NOTES
[facility]  TELEMEDICINE Progress Note  Teo Roe DO      The risks and benefits of converting to a virtual visit were discussed in light of the current infectious disease epidemic. Patient also understood that insurance coverage and co-pays are up to their individual insurance plans. Patient identification was verified at the start of the visit. Alfonso Moody  1975 07/23/20    Patient location: Home address on file  Provider location: Physician's home    Chief Complaint:   Alfonso Moody is a 38 y.o. patient who is here for f/u on medication. HPI:   Likes work. Working in [employer]  06/20/2020  1   04/09/2020  Diazepam 5 MG Tablet  60.00 15 Al Bin   [id number]   [employer] ([id number])   2  2.00 LME  Comm Ins   [place]   05/21/2020  1   04/09/2020  Diazepam 5 MG Tablet  60.00 15 Al Bin   [id number]   [employer] ([id number])   1  2.00 LME  [place]   04/15/2020  1   04/09/2020  Diazepam 5 MG Tablet  60.00 15 Al Bin   [id number]   [employer] ([id number])   0  2.00 LME  Comm Ins        Some increased stress with worying about mother. Feels like there is sensation of something stuck in his throat. ROS negative for headache, vision changes, chest pain, shortness of breath, abdominal pain, urinary sx, bowel changes. Past medical, surgical, and social history reviewed. Medications and allergies reviewed. Allergies   Allergen Reactions    Compazine [Prochlorperazine Maleate] Other (See Comments)     Makes her \"crazy\"     Prior to Visit Medications    Medication Sig Taking? Authorizing Provider   meloxicam (MOBIC) 7.5 MG tablet TAKE ONE TABLET BY MOUTH DAILY Yes Karlos Mckeon, DO   diazePAM (VALIUM) 5 MG tablet Take 1 tablet by mouth every 6 hours as needed for Anxiety for up to 30 days.  Yes Karlos Mckeon, DO   omeprazole (PRILOSEC) 20 MG delayed release capsule Take 1 capsule by mouth daily Yes Karlos Mckeon, DO   buPROPion (WELLBUTRIN XL) 300 MG extended release tablet Take 1 tablet by mouth every morning Yes Ranjith Bass Linda, DO   cloNIDine (CATAPRES) 0.3 MG tablet Take 3 tablets by mouth every evening Yes Ranjith Bass Dariusng, DO   triamcinolone (KENALOG) 0.025 % cream Apply topically 2 times daily. Yes Ranjith Bass Alfgcang, DO   ketoconazole (NIZORAL) 2 % cream Apply topically daily. Yes Buffaloralph Winchesterng, DO          Patient-Reported Vitals 7/23/2020   Patient-Reported Weight 190   Patient-Reported Height 5'7\" 1/2   Patient-Reported Temperature 97.5      There were no vitals filed for this visit.    Wt Readings from Last 3 Encounters:   01/14/20 202 lb 8 oz (91.9 kg)   10/14/19 196 lb (88.9 kg)   09/29/19 184 lb (83.5 kg)     BP Readings from Last 3 Encounters:   01/14/20 118/88   10/14/19 115/85   09/29/19 (!) 128/95       Patient Active Problem List   Diagnosis    Psychophysiological insomnia    Adjustment disorder with anxiety    Former smoker       Immunization History   Administered Date(s) Administered    Influenza, Quadv, IM, PF (6 mo and older Fluzone, Flulaval, Fluarix, and 3 yrs and older Afluria) 10/15/2018, 10/14/2019    MMR 10/14/2019    Tdap (Boostrix, Adacel) 10/14/2019       Past Medical History:   Diagnosis Date    Osteoarthritis      Past Surgical History:   Procedure Laterality Date    ABDOMINOPLASTY  2013    GASTRIC BAND  2004    LUMBAR DISCECTOMY [facility]     [facility]    PATELLA FRACTURE SURGERY  2001    TIBIA FRACTURE SURGERY      tibial plateu fx    WRIST SURGERY Left     1997     Family History   Problem Relation Age of Onset    Arthritis Mother      Social History     Socioeconomic History    Marital status:      Spouse name: Not on file    Number of children: Not on file    Years of education: Not on file    Highest education level: Not on file   Occupational History    Not on file   Social Needs    Financial resource strain: Not on file    Food insecurity     Worry: Not on file     Inability: Not on breathing or respiratory distress        [] Abnormal-      Musculoskeletal:   [] Normal gait with no signs of ataxia         [x] Normal range of motion of neck        [] Abnormal-       Neurological:        [] No Facial Asymmetry (Cranial nerve 7 motor function) (limited exam to video visit)          [x] No gaze palsy        [] Abnormal-         Skin:        [x] No significant exanthematous lesions or discoloration noted on facial skin         [] Abnormal-            Psychiatric:       [x] Normal Affect [] No Hallucinations        [] Abnormal-     Other pertinent observable physical exam findings-     Due to this being a TeleHealth encounter, evaluation of the following organ systems is limited: Vitals/Constitutional/EENT/Resp/CV/GI//MS/Neuro/Skin/Heme-Lymph-Imm. ASSESSMENT   Diagnosis Orders   1. Foreign body sensation in throat  [address], Yao Sheikh [address], DO, Otolaryngology, [facility]   2. Lumbar disc disease  meloxicam (MOBIC) 7.5 MG tablet   3. Polyarthralgia  meloxicam (MOBIC) 7.5 MG tablet   4. Hypermobility arthralgia  meloxicam (MOBIC) 7.5 MG tablet   5. Gastroesophageal reflux disease, esophagitis presence not specified  omeprazole (PRILOSEC) 20 MG delayed release capsule   6. Adjustment disorder with anxiety  buPROPion (WELLBUTRIN XL) 300 MG extended release tablet   7. Anxiety and depression  diazePAM (VALIUM) 5 MG tablet    buPROPion (WELLBUTRIN XL) 300 MG extended release tablet     #1: refer. #2-7: The current medical regimen is effective;  continue present plan and medications. Pt aware of need for every 3 month medication followup appointments, and that medication refills for benzodiazepines, narcotics and/or stimulants will only be given at appointment. The risks, benefits, potential side effects and barriers to medication use were addressed today. Understanding was acknowledged. Patient asked to follow-up if condition(s) do not improve as anticipated.       PLAN          If

## 2020-07-28 RX ORDER — BUPROPION HYDROCHLORIDE 300 MG/1
TABLET ORAL
Qty: 30 TABLET | Refills: 4 | Status: SHIPPED | OUTPATIENT
Start: 2020-07-28 | End: 2020-12-23 | Stop reason: SDUPTHER

## 2020-07-29 ENCOUNTER — TELEPHONE (OUTPATIENT)
Dept: FAMILY MEDICINE CLINIC | Age: 45
End: 2020-07-29

## 2020-07-29 NOTE — TELEPHONE ENCOUNTER
APPROVED NP/Consults w/Dr Artemio Tesfaye 7/23/20 to 7/23/21. Approval for his office only. Reason: Foreign Body in Throat. Please inform pt of approval.    Marco Vega - [phone]    Document attached.

## 2020-10-13 RX ORDER — CLONIDINE HYDROCHLORIDE 0.3 MG/1
TABLET ORAL
Qty: 90 TABLET | Refills: 10 | Status: SHIPPED | OUTPATIENT
Start: 2020-10-13 | End: 2021-08-23

## 2020-10-31 ENCOUNTER — HOSPITAL ENCOUNTER (EMERGENCY)
Age: 45
Discharge: HOME | End: 2020-10-31
Attending: EMERGENCY MEDICINE
Payer: OTHER GOVERNMENT

## 2020-10-31 VITALS
HEIGHT: 68 IN | DIASTOLIC BLOOD PRESSURE: 97 MMHG | WEIGHT: 190.9 LBS | HEART RATE: 71 BPM | SYSTOLIC BLOOD PRESSURE: 123 MMHG | OXYGEN SATURATION: 99 % | TEMPERATURE: 97.9 F | RESPIRATION RATE: 20 BRPM | BODY MASS INDEX: 28.93 KG/M2

## 2020-10-31 PROCEDURE — 99282 EMERGENCY DEPT VISIT SF MDM: CPT

## 2020-10-31 RX ORDER — POLYMYXIN B SULFATE AND TRIMETHOPRIM 1; 10000 MG/ML; [USP'U]/ML
1 SOLUTION OPHTHALMIC
Qty: 1 BOTTLE | Refills: 0 | Status: SHIPPED | OUTPATIENT
Start: 2020-10-31 | End: 2020-11-07

## 2020-10-31 RX ORDER — TETRACAINE HYDROCHLORIDE 5 MG/ML
1 SOLUTION OPHTHALMIC ONCE
Status: DISCONTINUED | OUTPATIENT
Start: 2020-10-31 | End: 2020-10-31 | Stop reason: HOSPADM

## 2020-10-31 ASSESSMENT — PAIN DESCRIPTION - DESCRIPTORS: DESCRIPTORS: ACHING

## 2020-10-31 ASSESSMENT — PAIN DESCRIPTION - ORIENTATION: ORIENTATION: LEFT

## 2020-10-31 ASSESSMENT — PAIN DESCRIPTION - LOCATION: LOCATION: EYE

## 2020-10-31 ASSESSMENT — VISUAL ACUITY
OU: 20/25
OS: 20/40
OD: 20/30

## 2020-10-31 ASSESSMENT — PAIN DESCRIPTION - FREQUENCY: FREQUENCY: CONTINUOUS

## 2020-10-31 NOTE — ED NOTES
Patient given d/c instructions with return verbalization including medication. Emphasis on f/u, to return with worsening s/s. Patient requested pain medicaine to take home ,EMD aware. Patient instructed to rest eye, keep closed and use cool compresses. Pt on phone continuously, educated about phone use straining eye, to use only for ED. To call [facility] Monday am, to return with worsening s/s.       Lesli Armendariz RN  10/31/20 [id number]

## 2020-11-01 NOTE — ED PROVIDER NOTES
JEANMARIE CHIEF COMPLAINT:   Chief Complaint   Patient presents with    Eye Problem         HPI: Louann Villanueva is a 38 y.o. female who presents to the Emergency Department with complaint of some left eye pain and slight foreign body sensation when she woke up this morning. She denies any injury. She has no purulent drainage. She states her vision is normal.  Denies any symptoms in the right eye. No headache. 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 The last episode was 12 years ago. She no longer wears contact lenses but does wear glasses for distant vision. She states the pain she is having now feels different than when she had herpes in her eye. REVIEW OF SYSTEMS:  6 systems reviewed. Pertinent positives per HPI. Otherwise noted to be negative. Nursing notes reviewed and agree with above. Past medical/surgical history reviewed. MEDICATIONS   Discharge Medication List as of 10/31/2020  1:03 PM      START taking these medications    Details   trimethoprim-polymyxin b (POLYTRIM) 09754-3.1 UNIT/ML-% ophthalmic solution Place 1 drop into the left eye every 4 hours (while awake) for 7 days, Disp-1 Bottle,R-0Print         CONTINUE these medications which have NOT CHANGED    Details   diazePAM (VALIUM PO) Take by mouthHistorical Med      cloNIDine (CATAPRES) 0.3 MG tablet TAKE THREE TABLETS BY MOUTH EVERY EVENING, Disp-90 tablet,R-10Normal      buPROPion (WELLBUTRIN XL) 300 MG extended release tablet TAKE ONE TABLET BY MOUTH EVERY MORNING, Disp-30 tablet,R-4Normal      meloxicam (MOBIC) 7.5 MG tablet TAKE ONE TABLET BY MOUTH DAILY, Disp-30 tablet,R-5Normal      omeprazole (PRILOSEC) 20 MG delayed release capsule Take 1 capsule by mouth daily, Disp-30 capsule,R-5Normal      triamcinolone (KENALOG) 0.025 % cream Apply topically 2 times daily. , Disp-80 g, R-1, Normal      ketoconazole (NIZORAL) 2 % cream Apply topically daily., Disp-60 g, R-1, Normal               ALLERGIES   Allergies   Allergen Reactions    Compazine [Prochlorperazine Maleate] Other (See Comments)     Makes her \"crazy\"         BP (!) 123/97   Pulse 71   Temp 97.9 °F (36.6 °C) (Oral)   Resp 20   Ht 5' 8\" (1.727 m)   Wt 190 lb 14.4 oz (86.6 kg)   LMP 10/24/2020   SpO2 99%   BMI 29.03 kg/m²   General:  No acute distress. Non toxic appearance  Head:   Normocephalic and atraumatic  Eyes:   There is no periorbital swelling. Minimal swelling noted of the upper lid margin without obvious hordeolum. There is no redness of the sclera and no chemosis. Visual acuity is 20/30 in the right eye and 20/40 in the left eye with glasses. TITO, EOMI. Noah normal OU to palpation. There is no foreign body seen on full inspection. Anterior chamber is clear. Patient has a superficial linear corneal abrasion in the 7 o'clock position shelter between the limbus and the visual axis. This is not shaped like a dendrite and does not clinically appear to be a herpes lesion. ENT:   Mucous membranes moist  Neck:   Supple. No adenopathy or jugular venous distension  Lungs/Chest:  No respiratory distress  CVS:   Regular rate and rhythm  Extremities:  Full range of motion  Skin:   No rashes or lesions to exposed skin  Neuro:  Alert and OX3. Speech clear and appropriate. No extremity weakness. Normal sensation in all extremities. No facial asymmetry. Gait normal.  Psych:   Affect normal. Mood normal        RADIOLOGY:      LAB      ED COURSE / MDM:  50-year-old female woke up with some mild left eye pain and foreign body sensation. No visual complaints. No drainage from her eye. 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 The last episode was 12 years ago. She states this feels different. Visual acuity is 20/30 in the unaffected eye and 20/40 in the left eye. There is no redness or chemosis.   No

## 2020-11-06 RX ORDER — OMEPRAZOLE 20 MG/1
CAPSULE, DELAYED RELEASE ORAL
Qty: 30 CAPSULE | Refills: 4 | Status: SHIPPED | OUTPATIENT
Start: 2020-11-06 | End: 2020-12-23 | Stop reason: SDUPTHER

## 2020-12-15 ENCOUNTER — OFFICE VISIT (OUTPATIENT)
Dept: ENT CLINIC | Age: 45
End: 2020-12-15
Payer: OTHER GOVERNMENT

## 2020-12-15 VITALS
WEIGHT: 187 LBS | DIASTOLIC BLOOD PRESSURE: 78 MMHG | BODY MASS INDEX: 28.34 KG/M2 | HEART RATE: 81 BPM | SYSTOLIC BLOOD PRESSURE: 115 MMHG | TEMPERATURE: 97 F | HEIGHT: 68 IN

## 2020-12-15 PROCEDURE — 99243 OFF/OP CNSLTJ NEW/EST LOW 30: CPT | Performed by: OTOLARYNGOLOGY

## 2020-12-15 PROCEDURE — 31575 DIAGNOSTIC LARYNGOSCOPY: CPT | Performed by: OTOLARYNGOLOGY

## 2020-12-15 ASSESSMENT — ENCOUNTER SYMPTOMS
SHORTNESS OF BREATH: 0
BLOOD IN STOOL: 0
VOICE CHANGE: 0
COUGH: 0
STRIDOR: 0
CHOKING: 0
WHEEZING: 0
COLOR CHANGE: 0
RHINORRHEA: 0
SINUS PRESSURE: 0
DIARRHEA: 0
NAUSEA: 0
PHOTOPHOBIA: 0
VOMITING: 0
EYE DISCHARGE: 0
EYE ITCHING: 0
SORE THROAT: 0
CONSTIPATION: 0
FACIAL SWELLING: 0
SINUS PAIN: 0
BACK PAIN: 0
TROUBLE SWALLOWING: 0

## 2020-12-15 NOTE — PROGRESS NOTES
[facility]  [address] [facility] [address]  P: [phone]  F: [phone]       Patient     Louann Villanueva  1975    ChiefComplaint     Chief Complaint   Patient presents with    New Patient     Patient is here today because she feels that something is lodged in her throat, this sensation started back in Feb. when she had a really bad cold, there is no pain when she swallows only annoyance       History of Present Illness     Louann Villanueva is a pleasant 54 y.o. female who presents as a new consultation referred by her primary care physician today for foreign body sensation of the throat. This has been present since February. In February she had a upper respiratory illness which was characterized mostly by a dry cough. Since then she has had a sensation of something stuck in her throat. She states it feels like there is a hair. It can cause her to gag occasionally. She denies any chronic throat clearing, change in voice, cough, dysphagia, odynophagia, hemoptysis. Patient drinks about 3 glasses of alcohol daily. She is a former 1 pack/week smoker and quit 4 years ago. Denies any neck masses, fevers, chills, night sweats or unexpected weight loss. She does have a history of acid reflux after a banding procedure on her stomach. She is on omeprazole 20 mg daily. She does not experience heartburn symptoms regularly.     Past Medical History     Past Medical History:   Diagnosis Date    Depression     Osteoarthritis        Past Surgical History     Past Surgical History:   Procedure Laterality Date    ABDOMINOPLASTY  2013    GASTRIC BAND  2004    LUMBAR DISCECTOMY [facility]     L 4/5- 2013[facility]    PATELLA FRACTURE SURGERY  2001    TIBIA FRACTURE SURGERY      tibial plateu fx    WRIST SURGERY Left     1997       Family History     Family History   Problem Relation Age of Onset    Arthritis Mother        Social History     Social History Socioeconomic History    Marital status:      Spouse name: Not on file    Number of children: Not on file    Years of education: Not on file    Highest education level: Not on file   Occupational History    Not on file   Social Needs    Financial resource strain: Not on file    Food insecurity     Worry: Not on file     Inability: Not on file    Transportation needs     Medical: Not on file     Non-medical: Not on file   Tobacco Use    Smoking status: Former Smoker     Packs/day: 0.20    Smokeless tobacco: Never Used    Tobacco comment: pt. quite a mth ago   Substance and Sexual Activity    Alcohol use: Yes     Comment: bottle of wine    Drug use: No    Sexual activity: Not on file   Lifestyle    Physical activity     Days per week: Not on file     Minutes per session: Not on file    Stress: Not on file   Relationships    Social connections     Talks on phone: Not on file     Gets together: Not on file     Attends Worship service: Not on file     Active member of club or organization: Not on file     Attends meetings of clubs or organizations: Not on file     Relationship status: Not on file    Intimate partner violence     Fear of current or ex partner: Not on file     Emotionally abused: Not on file     Physically abused: Not on file     Forced sexual activity: Not on file   Other Topics Concern    Not on file   Social History Narrative    Not on file       Allergies     Allergies   Allergen Reactions    Compazine [Prochlorperazine Maleate] Other (See Comments)     Makes her \"crazy\"       Medications     Current Outpatient Medications   Medication Sig Dispense Refill    omeprazole (PRILOSEC) 20 MG delayed release capsule TAKE ONE CAPSULE BY MOUTH DAILY 30 capsule 4    diazePAM (VALIUM PO) Take by mouth      cloNIDine (CATAPRES) 0.3 MG tablet TAKE THREE TABLETS BY MOUTH EVERY EVENING 90 tablet 10  buPROPion (WELLBUTRIN XL) 300 MG extended release tablet TAKE ONE TABLET BY MOUTH EVERY MORNING 30 tablet 4    meloxicam (MOBIC) 7.5 MG tablet TAKE ONE TABLET BY MOUTH DAILY 30 tablet 5    triamcinolone (KENALOG) 0.025 % cream Apply topically 2 times daily. 80 g 1    ketoconazole (NIZORAL) 2 % cream Apply topically daily. 60 g 1     No current facility-administered medications for this visit. Review of Systems     Review of Systems   Constitutional: Negative for activity change, appetite change, chills, diaphoresis, fatigue, fever and unexpected weight change. HENT: Negative for congestion, dental problem, drooling, ear discharge, ear pain, facial swelling, hearing loss, mouth sores, nosebleeds, postnasal drip, rhinorrhea, sinus pressure, sinus pain, sneezing, sore throat, tinnitus, trouble swallowing and voice change. Eyes: Negative for photophobia, discharge, itching and visual disturbance. Respiratory: Negative for cough, choking, shortness of breath, wheezing and stridor. Gastrointestinal: Negative for blood in stool, constipation, diarrhea, nausea and vomiting. Endocrine: Negative for cold intolerance, heat intolerance, polyphagia and polyuria. Musculoskeletal: Negative for back pain, gait problem, neck pain and neck stiffness. Skin: Negative for color change, pallor, rash and wound. Neurological: Negative for dizziness, syncope, facial asymmetry, speech difficulty, light-headedness, numbness and headaches. Hematological: Negative for adenopathy. Does not bruise/bleed easily. Psychiatric/Behavioral: Negative for agitation, confusion and sleep disturbance. PhysicalExam     Vitals:    12/15/20 1436   BP: 115/78   Pulse: 81   Temp: 97 °F (36.1 °C)       Physical Exam  Constitutional:       Appearance: She is well-developed.    HENT: Head: Normocephalic and atraumatic. Not macrocephalic and not microcephalic. No raccoon eyes, Santoro's sign, abrasion, contusion, right periorbital erythema, left periorbital erythema or laceration. Hair is normal.      Jaw: No trismus. Right Ear: Hearing, tympanic membrane and external ear normal. No decreased hearing noted. No drainage, swelling or tenderness. No middle ear effusion. No mastoid tenderness. Tympanic membrane is not perforated, retracted or bulging. Tympanic membrane has normal mobility. Left Ear: Hearing, tympanic membrane and external ear normal. No decreased hearing noted. No drainage, swelling or tenderness. No middle ear effusion. No mastoid tenderness. Tympanic membrane is not perforated, retracted or bulging. Tympanic membrane has normal mobility. Nose: No nasal deformity, septal deviation, laceration, mucosal edema or rhinorrhea. Right Sinus: No maxillary sinus tenderness or frontal sinus tenderness. Left Sinus: No maxillary sinus tenderness or frontal sinus tenderness. Mouth/Throat:      Mouth: Mucous membranes are not pale, not dry and not cyanotic. No lacerations or oral lesions. Dentition: Normal dentition. No dental caries or dental abscesses. Pharynx: Uvula midline. No oropharyngeal exudate, posterior oropharyngeal erythema or uvula swelling. Tonsils: No tonsillar abscesses. Eyes:      General: Lids are normal.         Right eye: No discharge. Left eye: No discharge. Extraocular Movements:      Right eye: Normal extraocular motion and no nystagmus. Left eye: Normal extraocular motion and no nystagmus. Conjunctiva/sclera:      Right eye: No chemosis or exudate. Left eye: No chemosis or exudate. Neck:      Musculoskeletal: Neck supple. Thyroid: No thyroid mass or thyromegaly. Vascular: Normal carotid pulses. Trachea: No tracheal tenderness or tracheal deviation.    Cardiovascular: Rate and Rhythm: Normal rate and regular rhythm. Pulmonary:      Effort: No tachypnea, bradypnea or respiratory distress. Breath sounds: No stridor. Musculoskeletal:      Right shoulder: She exhibits normal range of motion. Lymphadenopathy:      Head:      Right side of head: No submental, submandibular, tonsillar, preauricular, posterior auricular or occipital adenopathy. Left side of head: No submental, submandibular, tonsillar, preauricular, posterior auricular or occipital adenopathy. Cervical: No cervical adenopathy. Right cervical: No superficial, deep or posterior cervical adenopathy. Left cervical: No superficial, deep or posterior cervical adenopathy. Skin:     General: Skin is warm and dry. Findings: No bruising, erythema, laceration, lesion or rash. Neurological:      Mental Status: She is alert and oriented to person, place, and time. Cranial Nerves: No cranial nerve deficit. Psychiatric:         Speech: Speech normal.         Behavior: Behavior normal.           Procedure     Flexible Laryngoscopy    Pre op: Globus sensation. Procedure : Flexible Laryngoscopy  Surgeon: Carolann Graf DO  Anesthesia: Afrin with 4% lidocaine  Indication: Laryngeal mirror examination was not tolerated due to gag reflex  Description:  The scope was passed along the floor of the right naris to the level of the larynx. There was no evidence of concerning masses or lesions of the base of tongue, vallecula, epiglottis, aryepiglottic folds, arytenoids, false vocal folds, true vocal folds, or pyriform sinuses. True vocal folds exhibited symmetric motion bilaterally without evidence of paralysis or paresis. The scope was removed. The patient tolerated the procedure without difficulty. There were no complications. Pertinent findings: Elongated papillae posterior base of tongue. Otherwise normal.           Assessment and Plan     1.  Globus sensation Flexible laryngoscopy reveals no significant abnormal findings. She does have a black hairy tongue with elongated filiform papillae. This does appear to extend to the base of tongue. I suspect she may be experiencing a sensation of the elongated papillae due to the black hairy tongue. This may be secondary to her wine intake, previous smoking history, or diet. I recommend tongue scraping to see if this helps. Additionally she may increase her Prilosec to 40 mg to see if silent reflux may be causing some of the symptoms. Follow-up as needed. 2. Black hairy tongue        Return if symptoms worsen or fail to improve. Portions of this note were dictated using Dragon.  There may be linguistic errors secondary to the use of this program.

## 2020-12-23 ENCOUNTER — TELEPHONE (OUTPATIENT)
Dept: FAMILY MEDICINE CLINIC | Age: 45
End: 2020-12-23

## 2020-12-23 ENCOUNTER — VIRTUAL VISIT (OUTPATIENT)
Dept: FAMILY MEDICINE CLINIC | Age: 45
End: 2020-12-23
Payer: OTHER GOVERNMENT

## 2020-12-23 PROCEDURE — 99213 OFFICE O/P EST LOW 20 MIN: CPT | Performed by: FAMILY MEDICINE

## 2020-12-23 RX ORDER — OMEPRAZOLE 40 MG/1
40 CAPSULE, DELAYED RELEASE ORAL
Qty: 30 CAPSULE | Refills: 0 | Status: SHIPPED | OUTPATIENT
Start: 2020-12-23 | End: 2021-02-01

## 2020-12-23 RX ORDER — BUPROPION HYDROCHLORIDE 300 MG/1
TABLET ORAL
Qty: 30 TABLET | Refills: 5 | Status: SHIPPED | OUTPATIENT
Start: 2020-12-23 | End: 2021-07-26 | Stop reason: SDUPTHER

## 2020-12-23 RX ORDER — MELOXICAM 7.5 MG/1
TABLET ORAL
Qty: 30 TABLET | Refills: 5 | Status: SHIPPED | OUTPATIENT
Start: 2020-12-23 | End: 2021-07-20

## 2020-12-23 RX ORDER — DIAZEPAM 5 MG/1
5 TABLET ORAL EVERY 6 HOURS PRN
Qty: 60 TABLET | Refills: 2 | Status: SHIPPED | OUTPATIENT
Start: 2020-12-23 | End: 2021-03-26 | Stop reason: SDUPTHER

## 2020-12-23 RX ORDER — OMEPRAZOLE 20 MG/1
CAPSULE, DELAYED RELEASE ORAL
Qty: 30 CAPSULE | Refills: 5 | Status: SHIPPED | OUTPATIENT
Start: 2020-12-23 | End: 2021-08-13

## 2020-12-23 NOTE — PROGRESS NOTES
[facility]  TELEMEDICINE Progress Note  Angelito Kirby DO      The risks and benefits of converting to a virtual visit were discussed in light of the current infectious disease epidemic. Patient also understood that insurance coverage and co-pays are up to their individual insurance plans. Patient identification was verified at the start of the visit. France Brown  1975 12/23/20    Patient location: Home address on file  Provider location: [de-identified] home    Chief Complaint:   France Brown is a 54 y.o. patient who is here for insomnia, anxiety        HPI:   Doing well with the diazepam.  Needs maintenance prescription renewals on the bupropion and meloxicam.  Finds he needs meloxicam daily to help with sore joints and muscle aches. Denies any upset stomach on the NSAID. Did see the ENT doctor recently. Advised to increase to the 40 mg omeprazole. She requests the prescription be routed to [facility] and we decided to just have her on a temporary higher dose of 40 mg. ROS negative for headache, vision changes, chest pain, shortness of breath, abdominal pain, urinary sx, bowel changes. Past medical, surgical, and social history reviewed. Medications and allergies reviewed. Allergies   Allergen Reactions    Compazine [Prochlorperazine Maleate] Other (See Comments)     Makes her \"crazy\"     Prior to Visit Medications    Medication Sig Taking? Authorizing Provider   diazePAM (VALIUM) 5 MG tablet Take 1 tablet by mouth every 6 hours as needed for Anxiety for up to 30 days.  Yes Mariusz Mckeon,    buPROPion (WELLBUTRIN XL) 300 MG extended release tablet TAKE ONE TABLET BY MOUTH EVERY MORNING Yes Mariusz Mckeon DO   meloxicam (MOBIC) 7.5 MG tablet TAKE ONE TABLET BY MOUTH DAILY Yes Mariusz Mckeon DO   omeprazole (PRILOSEC) 40 MG delayed release capsule Take 1 capsule by mouth every morning (before breakfast) Yes Yane Montes De Oca, DO omeprazole (PRILOSEC) 20 MG delayed release capsule TAKE ONE CAPSULE BY MOUTH DAILY Yes Tricia Mckeon DO   diazePAM (VALIUM PO) Take by mouth Yes Historical Provider, MD   cloNIDine (CATAPRES) 0.3 MG tablet TAKE THREE TABLETS BY MOUTH EVERY EVENING Yes Tricia Mckeon, DO   triamcinolone (KENALOG) 0.025 % cream Apply topically 2 times daily. Yes Tricia Mckeon, DO   ketoconazole (NIZORAL) 2 % cream Apply topically daily. Yes Tricia Mckeon DO          Patient-Reported Vitals 12/23/2020   Patient-Reported Weight 180   Patient-Reported Height 5'8\"   Patient-Reported Temperature -      There were no vitals filed for this visit.    Wt Readings from Last 3 Encounters:   12/15/20 187 lb (84.8 kg)   10/31/20 190 lb 14.4 oz (86.6 kg)   01/14/20 202 lb 8 oz (91.9 kg)     BP Readings from Last 3 Encounters:   12/15/20 115/78   10/31/20 (!) 123/97   01/14/20 118/88       Patient Active Problem List   Diagnosis    Psychophysiological insomnia    Adjustment disorder with anxiety    Former smoker       Immunization History   Administered Date(s) Administered    Influenza, Quadv, IM, PF (6 mo and older Fluzone, Flulaval, Fluarix, and 3 yrs and older Afluria) 10/15/2018, 10/14/2019    MMR 10/14/2019    Tdap (Boostrix, Adacel) 10/14/2019       Past Medical History:   Diagnosis Date    Depression     Osteoarthritis      Past Surgical History:   Procedure Laterality Date    ABDOMINOPLASTY  2013    GASTRIC BAND  2004    L[facility]BAR [facility]     [facility]    PATELLA FRACTURE SURGERY  2001    TIBIA FRACTURE SURGERY      tibial plateu fx    WRIST SURGERY Left     1997     Family History   Problem Relation Age of Onset    Arthritis Mother      Social History     Socioeconomic History    Marital status:      Spouse name: Not on file    Number of children: Not on file    Years of education: Not on file    Highest education level: Not on file   Occupational History  Not on file   Social Needs    Financial resource strain: Not on file    Food insecurity     Worry: Not on file     Inability: Not on file    Transportation needs     Medical: Not on file     Non-medical: Not on file   Tobacco Use    Smoking status: Former Smoker     Packs/day: 0.20    Smokeless tobacco: Never Used    Tobacco comment: pt. quite a mth ago   Substance and Sexual Activity    Alcohol use: Yes     Comment: bottle of wine    Drug use: No    Sexual activity: Not on file   Lifestyle    Physical activity     Days per week: Not on file     Minutes per session: Not on file    Stress: Not on file   Relationships    Social connections     Talks on phone: Not on file     Gets together: Not on file     Attends Orthodox service: Not on file     Active member of club or organization: Not on file     Attends meetings of clubs or organizations: Not on file     Relationship status: Not on file    Intimate partner violence     Fear of current or ex partner: Not on file     Emotionally abused: Not on file     Physically abused: Not on file     Forced sexual activity: Not on file   Other Topics Concern    Not on file   Social History Narrative    Not on file       O: There were no vitals taken for this visit. Physical Exam  PHYSICAL EXAMINATION:  [ INSTRUCTIONS:  \"[x]\" Indicates a positive item  \"[]\" Indicates a negative item  -- DELETE ALL ITEMS NOT EXAMINED]  Vital Signs: (As obtained by patient/caregiver or practitioner observation)    Constitutional: [x] Appears well-developed and well-nourished [x] No apparent distress      [] Abnormal-   Mental status  [x] Alert and awake  [x] Oriented to person/place/time [x]Able to follow commands      Eyes:  EOM    [x]  Normal  [] Abnormal-  Sclera  [x]  Normal  [] Abnormal -         Discharge [x]  None visible  [] Abnormal -    HENT:   [x] Normocephalic, atraumatic.   [] Abnormal   [] Mouth/Throat: Mucous membranes are moist. External Ears [x] Normal  [] Abnormal-     Neck: [x] No visualized mass     Pulmonary/Chest: [x] Respiratory effort normal.  [x] No visualized signs of difficulty breathing or respiratory distress        [] Abnormal-      Musculoskeletal:   [] Normal gait with no signs of ataxia         [x] Normal range of motion of neck        [] Abnormal-       Neurological:        [x] No Facial Asymmetry (Cranial nerve 7 motor function) (limited exam to video visit)          [x] No gaze palsy        [] Abnormal-         Skin:        [x] No significant exanthematous lesions or discoloration noted on facial skin         [] Abnormal-            Psychiatric:       [x] Normal Affect [] No Hallucinations        [] Abnormal-     Other pertinent observable physical exam findings- n/a    Due to this being a TeleHealth encounter, evaluation of the following organ systems is limited: Vitals/Constitutional/EENT/Resp/CV/GI//MS/Neuro/Skin/Heme-Lymph-Imm. ASSESSMENT   Diagnosis Orders   1. Anxiety and depression  diazePAM (VALIUM) 5 MG tablet    buPROPion (WELLBUTRIN XL) 300 MG extended release tablet   2. Adjustment disorder with anxiety  buPROPion (WELLBUTRIN XL) 300 MG extended release tablet   3. Lumbar disc disease  meloxicam (MOBIC) 7.5 MG tablet   4. Polyarthralgia  meloxicam (MOBIC) 7.5 MG tablet   5. Hypermobility arthralgia  meloxicam (MOBIC) 7.5 MG tablet   6. Gastroesophageal reflux disease  omeprazole (PRILOSEC) 20 MG delayed release capsule     #1-5: The current medical regimen is effective;  continue present plan and medications. Pt aware of need for every 3 month medication followup appointments, and that medication refills for benzodiazepines, narcotics and/or stimulants will only be given at appointment. The risks, benefits, potential side effects and barriers to medication use were addressed today. Understanding was acknowledged. Patient asked to follow-up if condition(s) do not improve as anticipated.

## 2020-12-23 NOTE — TELEPHONE ENCOUNTER
PLEASE call  Alida TO SET UP  Next telehealth for [facility] of [facility] March 22-26.

## 2021-02-01 RX ORDER — OMEPRAZOLE 40 MG/1
CAPSULE, DELAYED RELEASE ORAL
Qty: 30 CAPSULE | Refills: 0 | Status: SHIPPED | OUTPATIENT
Start: 2021-02-01 | End: 2021-03-08

## 2021-03-08 RX ORDER — OMEPRAZOLE 40 MG/1
CAPSULE, DELAYED RELEASE ORAL
Qty: 30 CAPSULE | Refills: 0 | Status: SHIPPED | OUTPATIENT
Start: 2021-03-08 | End: 2021-03-26 | Stop reason: SDUPTHER

## 2021-03-26 ENCOUNTER — VIRTUAL VISIT (OUTPATIENT)
Dept: FAMILY MEDICINE CLINIC | Age: 46
End: 2021-03-26
Payer: OTHER GOVERNMENT

## 2021-03-26 DIAGNOSIS — F32.A ANXIETY AND DEPRESSION: ICD-10-CM

## 2021-03-26 DIAGNOSIS — M25.50 POLYARTHRALGIA: ICD-10-CM

## 2021-03-26 DIAGNOSIS — F43.22 ADJUSTMENT DISORDER WITH ANXIETY: Primary | ICD-10-CM

## 2021-03-26 DIAGNOSIS — F41.9 ANXIETY AND DEPRESSION: ICD-10-CM

## 2021-03-26 DIAGNOSIS — K21.9 GASTROESOPHAGEAL REFLUX DISEASE, UNSPECIFIED WHETHER ESOPHAGITIS PRESENT: ICD-10-CM

## 2021-03-26 DIAGNOSIS — Z01.419 WELL WOMAN EXAM: ICD-10-CM

## 2021-03-26 PROCEDURE — 99213 OFFICE O/P EST LOW 20 MIN: CPT | Performed by: FAMILY MEDICINE

## 2021-03-26 RX ORDER — DIAZEPAM 5 MG/1
5 TABLET ORAL EVERY 6 HOURS PRN
Qty: 60 TABLET | Refills: 2 | Status: SHIPPED | OUTPATIENT
Start: 2021-03-26 | End: 2021-08-13 | Stop reason: SDUPTHER

## 2021-03-26 RX ORDER — OMEPRAZOLE 40 MG/1
CAPSULE, DELAYED RELEASE ORAL
Qty: 30 CAPSULE | Refills: 5 | Status: SHIPPED | OUTPATIENT
Start: 2021-03-26 | End: 2021-10-18

## 2021-03-26 SDOH — ECONOMIC STABILITY: TRANSPORTATION INSECURITY
IN THE PAST 12 MONTHS, HAS LACK OF TRANSPORTATION KEPT YOU FROM MEETINGS, WORK, OR FROM GETTING THINGS NEEDED FOR DAILY LIVING?: NO

## 2021-03-26 NOTE — PROGRESS NOTES
[facility]  Progress Note  Rosario Mkai DO Donn Lopez  [id number]    03/28/21    Chief Complaint:   Donn Lopez is a 54 y.o. female who is here for anxiety        HPI:   Here for 3-month follow-up. Doing well overall. The diazepam is helpful. Denies any daytime drowsiness. It has been working better since last Pap test.  Continues to experience mild sore joints. Requests renewal of PPI. ROS negative for headache, visionchanges, chest pain, shortness of breath, abdominal pain, urinary sx, bowel changes. Past medical, surgical, and social history reviewed. and allergies reviewed. Allergies   Allergen Reactions    Compazine [Prochlorperazine Maleate] Other (See Comments)     Makes her \"crazy\"     Prior to Visit Medications    Medication Sig Taking? Authorizing Provider   diazePAM (VALIUM) 5 MG tablet Take 1 tablet by mouth every 6 hours as needed for Anxiety for up to 30 days. Yes Marcos Mckeon, DO   omeprazole (PRILOSEC) 40 MG delayed release capsule TAKE ONE CAPSULE BY MOUTH EVERY MORNING BEFORE BREAKFAST Yes Marcos Mckeon, DO   buPROPion (WELLBUTRIN XL) 300 MG extended release tablet TAKE ONE TABLET BY MOUTH EVERY MORNING Yes Marcos Mckeon, DO   meloxicam (MOBIC) 7.5 MG tablet TAKE ONE TABLET BY MOUTH DAILY Yes Marcos Mckeon, DO   diazePAM (VALIUM PO) Take by mouth Yes Historical Provider, MD   cloNIDine (CATAPRES) 0.3 MG tablet TAKE THREE TABLETS BY MOUTH EVERY EVENING Yes Marcos Mckeon, DO   triamcinolone (KENALOG) 0.025 % cream Apply topically 2 times daily. Yes Marcos Mckeon, DO   ketoconazole (NIZORAL) 2 % cream Apply topically daily. Yes Marcos Mckeon, DO   omeprazole (PRILOSEC) 20 MG delayed release capsule TAKE ONE CAPSULE BY MOUTH DAILY  Patient not taking: Reported on 3/26/2021  Marcos Mckeon DO          There were no vitals filed for this visit.    Wt Readings from Last 3 Encounters: 12/15/20 187 lb (84.8 kg)   10/31/20 190 lb 14.4 oz (86.6 kg)   01/14/20 202 lb 8 oz (91.9 kg)     BP Readings from Last 3 Encounters:   12/15/20 115/78   10/31/20 (!) 123/97   01/14/20 118/88       Patient Active Problem List   Diagnosis    Psychophysiological insomnia    Adjustment disorder with anxiety    Former smoker       Immunization History   Administered Date(s) Administered    COVID-19, Pfizer, PF, 30mcg/0.3mL 03/12/2021    Influenza, Quadv, IM, PF (6 mo and older Fluzone, Flulaval, Fluarix, and 3 yrs and older Afluria) 10/15/2018, 10/14/2019    MMR 10/14/2019    Tdap (Boostrix, Adacel) 10/14/2019       Past Medical History:   Diagnosis Date    Depression     Osteoarthritis      Past Surgical History:   Procedure Laterality Date    ABDOMINOPLASTY  2013    GASTRIC BAND  2004    LUMBAR DISCECTOMY Bilateral     L 4/5- 2013- [address]    PATELLA FRACTURE SURGERY  2001    TIBIA FRACTURE SURGERY      tibial plateu fx    WRIST SURGERY Left     1997     Family History   Problem Relation Age of Onset    Arthritis Mother      Social History     Socioeconomic History    Marital status:      Spouse name: Not on file    Number of children: Not on file    Years of education: Not on file    Highest education level: Not on file   Occupational History    Not on file   Social Needs    Financial resource strain: Not hard at all   Eugenio-Kaity insecurity     Worry: Never true     Inability: Never true   [employer] needs     Medical: No     Non-medical: No   Tobacco Use    Smoking status: Former Smoker     Packs/day: 0.20    Smokeless tobacco: Never Used    Tobacco comment: pt. quite a mth ago   Substance and Sexual Activity    Alcohol use: Yes     Comment: bottle of wine    Drug use: No    Sexual activity: Not on file   Lifestyle    Physical activity     Days per week: Not on file     Minutes per session: Not on file    Stress: Not on file   Relationships    Social connections     Talks on phone: Not on file     Gets together: Not on file     Attends Adventism service: Not on file     Active member of club or organization: Not on file     Attends meetings of clubs or organizations: Not on file     Relationship status: Not on file    Intimate partner violence     Fear of current or ex partner: Not on file     Emotionally abused: Not on file     Physically abused: Not on file     Forced sexual activity: Not on file   Other Topics Concern    Not on file   Social History Narrative    Not on file       O: There were no vitals taken for this visit. Physical Exam  GEN: No acute distress,cooperative, well nourished, alert. HEENT: PEERLA, EOMI , normocephalic/atraumatic, external nose appears normal.  External ear is normal.    Neck: soft, supple, no appreciable thyromegaly,mass  CV: No upper extremity edema. Resp:  Breathing comfortably. Psych:normal affect. Neuro: AOx3  Other Pertinent Physical Exam findings: Nonapplicable      ASSESSMENT   Diagnosis Orders   1. Adjustment disorder with anxiety     2. Anxiety and depression  diazePAM (VALIUM) 5 MG tablet   3. Gastroesophageal reflux disease, unspecified whether esophagitis present  omeprazole (PRILOSEC) 40 MG delayed release capsule   4. Polyarthralgia  CBC    COMPREHENSIVE METABOLIC PANEL   5. Well woman exam  Erick Celis MD, Gynecology, [facility]     #1-2: The current medical regimen is effective;  continue present plan and medications. Pt aware of need for every 3 month medication followup appointments, and that medication refills for benzodiazepines, narcotics and/or stimulants will only be given at appointment. The risks, benefits, potential side effects and barriers to medication use were addressed today. Understanding was acknowledged. Patient asked to follow-up if condition(s) do not improve as anticipated. #3: The current medical regimen is effective;  continue present plan and medications.   #4: Proceed with testing. #5: Refer. PLAN          Time spent on encounter (to include any same day medical record review): 22 minutes  Established E/M: 10-19 (13355), 20-29 (33677), 30-39 (27633), 40-54 (33430)   New E/M: 15-29 (01456), 30-44 (26652), 45-59 (56503), 60-74 (14520)  Telephone E/M: 5-10 (59239), 11-20 (28517), 21-30 (48945)    If applicable, see additional patient information and instructions under \"Patient Instructions. \"    Return in about 3 months (around 6/26/2021). Patient Instructions     Please call [phone] to set up routine well woman's exam to meet with Dr. Leonie Mcburney. Please note a portion of this chart was generated using dragon dictation software. Although every effort was made to ensure the accuracy of this automated transcription,some errors in transcription may have occurred.

## 2021-05-26 ENCOUNTER — TELEPHONE (OUTPATIENT)
Dept: FAMILY MEDICINE CLINIC | Age: 46
End: 2021-05-26

## 2021-05-26 DIAGNOSIS — J30.89 ALLERGIC RHINITIS DUE TO OTHER ALLERGIC TRIGGER, UNSPECIFIED SEASONALITY: Primary | ICD-10-CM

## 2021-05-26 RX ORDER — BUTALBITAL, ACETAMINOPHEN AND CAFFEINE 50; 325; 40 MG/1; MG/1; MG/1
1 TABLET ORAL EVERY 6 HOURS PRN
Qty: 15 TABLET | Refills: 0 | Status: SHIPPED | OUTPATIENT
Start: 2021-05-26 | End: 2021-11-22

## 2021-05-26 RX ORDER — DESLORATADINE 5 MG/1
5 TABLET ORAL DAILY
Qty: 30 TABLET | Refills: 1 | Status: SHIPPED | OUTPATIENT
Start: 2021-05-26 | End: 2021-09-01

## 2021-05-26 NOTE — TELEPHONE ENCOUNTER
Patient would like allergy med prescribed to help her with allergies. HEART [facility] [address], [address] [place] [address]   Ewa Francisco [address] [place] [id number]

## 2021-06-07 ENCOUNTER — OFFICE VISIT (OUTPATIENT)
Dept: GYNECOLOGY | Age: 46
End: 2021-06-07
Payer: OTHER GOVERNMENT

## 2021-06-07 VITALS
HEART RATE: 76 BPM | WEIGHT: 187.4 LBS | HEIGHT: 67 IN | BODY MASS INDEX: 29.41 KG/M2 | RESPIRATION RATE: 17 BRPM | SYSTOLIC BLOOD PRESSURE: 120 MMHG | DIASTOLIC BLOOD PRESSURE: 78 MMHG | OXYGEN SATURATION: 97 %

## 2021-06-07 DIAGNOSIS — Z01.419 WELL WOMAN EXAM WITH ROUTINE GYNECOLOGICAL EXAM: Primary | ICD-10-CM

## 2021-06-07 PROCEDURE — 99386 PREV VISIT NEW AGE 40-64: CPT | Performed by: OBSTETRICS & GYNECOLOGY

## 2021-06-07 ASSESSMENT — ENCOUNTER SYMPTOMS
EYES NEGATIVE: 1
RESPIRATORY NEGATIVE: 1
GASTROINTESTINAL NEGATIVE: 1

## 2021-06-08 NOTE — PROGRESS NOTES
Subjective:      Patient ID: Aurelio Overton is a 54 y.o. female. Patient is here for annual.     Gynecologic Exam        Review of Systems   Constitutional: Negative. HENT: Negative. Eyes: Negative. Respiratory: Negative. Cardiovascular: Negative. Gastrointestinal: Negative. Genitourinary: Negative. Musculoskeletal: Negative. Skin: Negative. Neurological: Negative. Psychiatric/Behavioral: Negative.       Date of Birth 1975  Past Medical History:   Diagnosis Date    Depression     EDS (Tiffany-Danlos syndrome)     Osteoarthritis      Past Surgical History:   Procedure Laterality Date    ABDOMINOPLASTY  2013    GASTRECTOMY  2018    partial-band removed    GASTRIC BAND  2004    LUMBAR [facility]     L [facility]    PATELLA FRACTURE SURGERY      TIBIA FRACTURE SURGERY      tibial plateu fx    TUBAL LIGATION      WRIST SURGERY Left          OB History    Para Term  AB Living   3 3 3     3   SAB TAB Ectopic Molar Multiple Live Births                    # Outcome Date GA Lbr Shiva/2nd Weight Sex Delivery Anes PTL Lv   3 Term     M Vag-Spont      2 Term     M Vag-Spont      1 Term     F Vag-Spont        Social History     Socioeconomic History    Marital status:      Spouse name: Not on file    Number of children: Not on file    Years of education: Not on file    Highest education level: Not on file   Occupational History    Not on file   Tobacco Use    Smoking status: Former Smoker     Packs/day: 0.20    Smokeless tobacco: Never Used    Tobacco comment: pt. quite a mth ago   Vaping Use    Vaping Use: Never used   Substance and Sexual Activity    Alcohol use: Yes     Comment: bottle of wine    Drug use: No    Sexual activity: Not on file   Other Topics Concern    Not on file   Social History Narrative    Not on file     Social Determinants of Health     Financial Resource Strain: Low Risk     Difficulty of Paying Living Expenses: Not hard at all   Food Insecurity: No Food Insecurity    Worried About [address] in the Last Year: Never true    Shobha of Food in the Last Year: Never true   Transportation Needs: No Transportation Needs    Lack of Transportation (Medical): No    Lack of Transportation (Non-Medical): No   Physical Activity:     Days of Exercise per Week:     Minutes of Exercise per Session:    Stress:     Feeling of Stress :    Social Connections:     Frequency of Communication with Friends and Family:     Frequency of Social Gatherings with Friends and Family:     Attends Hoahaoism Services:     Active Member of Clubs or Organizations:     Attends Club or Organization Meetings:     Marital Status:    Intimate Partner Violence:     Fear of Current or Ex-Partner:     Emotionally Abused:     Physically Abused:     Sexually Abused:       Allergies   Allergen Reactions    Compazine [Prochlorperazine Maleate] Other (See Comments)     Makes her \"crazy\"     Outpatient Medications Marked as Taking for the 6/7/21 encounter (Office Visit) with Eduardo Vergara MD   Medication Sig Dispense Refill    butalbital-acetaminophen-caffeine (FIORICET, ESGIC) -40 MG per tablet Take 1 tablet by mouth every 6 hours as needed for Headaches 15 tablet 0    omeprazole (PRILOSEC) 40 MG delayed release capsule TAKE ONE CAPSULE BY MOUTH EVERY MORNING BEFORE BREAKFAST 30 capsule 5    buPROPion (WELLBUTRIN XL) 300 MG extended release tablet TAKE ONE TABLET BY MOUTH EVERY MORNING 30 tablet 5    meloxicam (MOBIC) 7.5 MG tablet TAKE ONE TABLET BY MOUTH DAILY 30 tablet 5    diazePAM (VALIUM PO) Take by mouth       Family History   Problem Relation Age of Onset    Arthritis Mother      /78 (Site: Right Upper Arm, Position: Sitting, Cuff Size: Large Adult)   Pulse 76   Resp 17   Ht 5' 7\" (1.702 m)   Wt 187 lb 6.4 oz (85 kg)   LMP 05/16/2021   SpO2 97%   BMI 29.35 kg/m²       Objective:   Physical Exam Constitutional:       Appearance: Normal appearance. She is well-developed and normal weight. HENT:      Head: Normocephalic. Nose: Nose normal.      Mouth/Throat:      Mouth: Mucous membranes are moist.      Pharynx: Oropharynx is clear. Eyes:      Pupils: Pupils are equal, round, and reactive to light. Neck:      Thyroid: No thyromegaly. Cardiovascular:      Rate and Rhythm: Normal rate and regular rhythm. Pulses: Normal pulses. Heart sounds: Normal heart sounds. No murmur heard. No friction rub. No gallop. Pulmonary:      Effort: Pulmonary effort is normal. No respiratory distress. Breath sounds: Normal breath sounds. No stridor. No wheezing, rhonchi or rales. Chest:      Chest wall: No tenderness. Breasts:         Right: Normal. No swelling, bleeding, inverted nipple, mass, nipple discharge, skin change or tenderness. Left: Normal. No swelling, bleeding, inverted nipple, mass, nipple discharge, skin change or tenderness. Abdominal:      General: Bowel sounds are normal. There is no distension. Palpations: Abdomen is soft. There is no mass. Tenderness: There is no abdominal tenderness. There is no guarding or rebound. Hernia: No hernia is present. There is no hernia in the left inguinal area. Genitourinary:     General: Normal vulva. Exam position: Lithotomy position. Pubic Area: No rash. Labia:         Right: No rash, tenderness, lesion or injury. Left: No rash, tenderness, lesion or injury. Urethra: No prolapse, urethral pain, urethral swelling or urethral lesion. Vagina: No signs of injury and foreign body. No vaginal discharge, erythema, tenderness, bleeding, lesions or prolapsed vaginal walls. Cervix: No cervical motion tenderness, discharge, friability, lesion, erythema, cervical bleeding or eversion. Uterus: Not deviated, not enlarged, not fixed and not tender.        Adnexa:         Right: No mass, tenderness or fullness. Left: No mass, tenderness or fullness. Rectum: No anal fissure or external hemorrhoid. Comments: Normal urethral meatus, normal urethra, nl bladder  Musculoskeletal:         General: No tenderness. Normal range of motion. Cervical back: Normal range of motion and neck supple. No rigidity. Lymphadenopathy:      Cervical: No cervical adenopathy. Lower Body: No right inguinal adenopathy. No left inguinal adenopathy. Skin:     General: Skin is warm and dry. Coloration: Skin is not pale. Findings: No erythema or rash. Neurological:      General: No focal deficit present. Mental Status: She is alert and oriented to person, place, and time. Mental status is at baseline. Deep Tendon Reflexes: Reflexes are normal and symmetric. Psychiatric:         Mood and Affect: Mood normal.         Behavior: Behavior normal.         Thought Content: Thought content normal.         Judgment: Judgment normal.         Assessment:      1. Annual        Plan:      1.  Pap, calcium, exercise, mammogram        Norma Carr MD

## 2021-06-10 LAB
HPV COMMENT: ABNORMAL
HPV TYPE 16: NOT DETECTED
HPV TYPE 18: NOT DETECTED
HPVOH (OTHER TYPES): DETECTED

## 2021-07-20 DIAGNOSIS — M25.50 POLYARTHRALGIA: ICD-10-CM

## 2021-07-20 DIAGNOSIS — M51.9 LUMBAR DISC DISEASE: ICD-10-CM

## 2021-07-20 DIAGNOSIS — M25.50 HYPERMOBILITY ARTHRALGIA: ICD-10-CM

## 2021-07-20 RX ORDER — MELOXICAM 7.5 MG/1
TABLET ORAL
Qty: 30 TABLET | Refills: 4 | Status: SHIPPED | OUTPATIENT
Start: 2021-07-20 | End: 2021-11-22 | Stop reason: SDUPTHER

## 2021-07-26 DIAGNOSIS — F32.A ANXIETY AND DEPRESSION: ICD-10-CM

## 2021-07-26 DIAGNOSIS — F43.22 ADJUSTMENT DISORDER WITH ANXIETY: ICD-10-CM

## 2021-07-26 DIAGNOSIS — F41.9 ANXIETY AND DEPRESSION: ICD-10-CM

## 2021-07-26 RX ORDER — BUPROPION HYDROCHLORIDE 300 MG/1
TABLET ORAL
Qty: 30 TABLET | Refills: 5 | Status: SHIPPED | OUTPATIENT
Start: 2021-07-26 | End: 2021-10-01

## 2021-07-26 NOTE — TELEPHONE ENCOUNTER
----- Message from David Aguilar sent at 7/26/2021  3:24 PM EDT -----  Subject: Refill Request    QUESTIONS  Name of Medication? buPROPion (WELLBUTRIN XL) 300 MG extended release   tablet  Patient-reported dosage and instructions? take 1 tablet by mouth  How many days do you have left? 0  Preferred Pharmacy? [facility] phone number (if available)? [phone]  Additional Information for Provider? patient said she lost her bottle of   pills . please call her back if any problem  ---------------------------------------------------------------------------  --------------  CALL BACK INFO  What is the best way for the office to contact you? OK to leave message on   voicemail  Preferred Call Back Phone Number?  [phone]

## 2021-07-28 ENCOUNTER — OFFICE VISIT (OUTPATIENT)
Dept: GYNECOLOGY | Age: 46
End: 2021-07-28
Payer: OTHER GOVERNMENT

## 2021-07-28 VITALS
BODY MASS INDEX: 29.25 KG/M2 | HEART RATE: 61 BPM | OXYGEN SATURATION: 99 % | SYSTOLIC BLOOD PRESSURE: 124 MMHG | DIASTOLIC BLOOD PRESSURE: 76 MMHG | RESPIRATION RATE: 17 BRPM | HEIGHT: 68 IN | WEIGHT: 193 LBS

## 2021-07-28 DIAGNOSIS — R87.810 ASCUS WITH POSITIVE HIGH RISK HPV CERVICAL: Primary | ICD-10-CM

## 2021-07-28 DIAGNOSIS — R87.610 ASCUS WITH POSITIVE HIGH RISK HPV CERVICAL: Primary | ICD-10-CM

## 2021-07-28 PROCEDURE — 57454 BX/CURETT OF CERVIX W/SCOPE: CPT | Performed by: OBSTETRICS & GYNECOLOGY

## 2021-07-29 NOTE — PROGRESS NOTES
Patient is here for colposcopy for ascus pap with pos hpv.      Review of Systems: as above  General ROS: negative  Psychological ROS: negative  Ophthalmic ROS: negative  ENT ROS: negative  Allergy and Immunology ROS: negative  Hematological and Lymphatic ROS: negative  Endocrine ROS: negative  Breast ROS: negative  Respiratory ROS: negative  Cardiovascular ROS: negative  Gastrointestinal ROS: negative  Genito-Urinary ROS: negative  Musculoskeletal ROS: negative  Neurological ROS: negative  Dermatological ROS: negative    Date of Birth 1975  Past Medical History:   Diagnosis Date    Depression     EDS (Tiffany-Danlos syndrome)     Osteoarthritis      Past Surgical History:   Procedure Laterality Date    ABDOMINOPLASTY  2013    GASTRECTOMY  2018    partial-band removed    GASTRIC BAND  2004    LUMBAR DISCECTOMY Bilateral     L - - [address]    PATELLA FRACTURE SURGERY      TIBIA FRACTURE SURGERY      tibial plateu fx    TUBAL LIGATION      WRIST SURGERY Left          OB History    Para Term  AB Living   3 3 3     3   SAB TAB Ectopic Molar Multiple Live Births                    # Outcome Date GA Lbr Shiva/2nd Weight Sex Delivery Anes PTL Lv   3 Term     M Vag-Spont      2 Term     M Vag-Spont      1 Term     F Vag-Spont        Social History     Socioeconomic History    Marital status:      Spouse name: Not on file    Number of children: Not on file    Years of education: Not on file    Highest education level: Not on file   Occupational History    Not on file   Tobacco Use    Smoking status: Former Smoker     Packs/day: 0.20    Smokeless tobacco: Never Used    Tobacco comment: pt. quite a mth ago   Vaping Use    Vaping Use: Never used   Substance and Sexual Activity    Alcohol use: Yes     Comment: bottle of wine    Drug use: No    Sexual activity: Not on file   Other Topics Concern    Not on file   Social History Narrative    Not on file     Social informed consent obtained. Speculum placed in vagina and excellent visualization of cervix achieved, cervix swabbed x 3 with acetic acid solution. Findings:  Cervix: AW changes around os minimal. No puctation. TZ well visualized. Vaginal inspection: vaginal colposcopy not performed. Vulvar colposcopy: vulvar colposcopy not performed. Specimens: BX 12 oclock ectocervical, ECC    Complications: none. Plan: Will base further treatment on Pathology findings.

## 2021-08-13 ENCOUNTER — VIRTUAL VISIT (OUTPATIENT)
Dept: FAMILY MEDICINE CLINIC | Age: 46
End: 2021-08-13
Payer: OTHER GOVERNMENT

## 2021-08-13 DIAGNOSIS — F32.A ANXIETY AND DEPRESSION: ICD-10-CM

## 2021-08-13 DIAGNOSIS — G47.00 INSOMNIA, UNSPECIFIED TYPE: ICD-10-CM

## 2021-08-13 DIAGNOSIS — N89.8 VAGINAL IRRITATION: ICD-10-CM

## 2021-08-13 DIAGNOSIS — N73.0 PID (ACUTE PELVIC INFLAMMATORY DISEASE): Primary | ICD-10-CM

## 2021-08-13 DIAGNOSIS — R53.83 OTHER FATIGUE: ICD-10-CM

## 2021-08-13 DIAGNOSIS — R60.9 EDEMA, UNSPECIFIED TYPE: Primary | ICD-10-CM

## 2021-08-13 DIAGNOSIS — F41.9 ANXIETY AND DEPRESSION: ICD-10-CM

## 2021-08-13 PROCEDURE — 99213 OFFICE O/P EST LOW 20 MIN: CPT | Performed by: FAMILY MEDICINE

## 2021-08-13 RX ORDER — CLINDAMYCIN PHOSPHATE 20 MG/G
CREAM VAGINAL
Qty: 1 TUBE | Refills: 0 | OUTPATIENT
Start: 2021-08-13 | End: 2021-08-20

## 2021-08-13 RX ORDER — DIAZEPAM 5 MG/1
5 TABLET ORAL EVERY 6 HOURS PRN
Qty: 60 TABLET | Refills: 2 | Status: SHIPPED | OUTPATIENT
Start: 2021-08-13 | End: 2021-09-01 | Stop reason: SDUPTHER

## 2021-08-13 NOTE — PROGRESS NOTES
[facility]  TELEMEDICINE Progress Note  Yelitza Orourke,       The risks and benefits of converting to a virtual visit were discussed in light of the current infectious disease epidemic. Patient also understood that insurance coverage and co-pays are up to their individual insurance plans. Patient identification was verified at the start of the visit. Tip Weber  1975 08/13/21    Patient location: Home address on file  Provider location: Physician's home    Chief Complaint:   Tip Weber is a 54 y.o. patient who is here for swelling and anxiety        HPI:   Diazepam helps. Denies side effects. Got down to 179 for her wait. Weight increrased to the 190s. Has had a swelling in legs. Wedding band is tighter. Takes one meloxicam daily. HR in the 120s. Has had gastrectomy. She wonders about dumping syndrome. Has already tried [place]. Interested in switching clonidine to a different medicine for ADHD. Her 3 children did not do well with stimulant. She is on Wellbutrin 300 mg. Has not been on Strattera. ROS negative for headache, vision changes, chest pain, shortness of breath, abdominal pain, urinary sx, bowel changes. Past medical, surgical, and social history reviewed. Medications and allergies reviewed. Allergies   Allergen Reactions    Compazine [Prochlorperazine Maleate] Other (See Comments)     Makes her \"crazy\"     Prior to Visit Medications    Medication Sig Taking? Authorizing Provider   diazePAM (VALIUM) 5 MG tablet Take 1 tablet by mouth every 6 hours as needed for Anxiety for up to 30 days. Yes Gayle Mckeon DO   Suvorexant 20 MG TABS Take 1 tablet by mouth nightly for 30 doses.  Yes Gayle Mckeon, DO   buPROPion (WELLBUTRIN XL) 300 MG extended release tablet TAKE ONE TABLET BY MOUTH EVERY MORNING Yes Gayle Mckeon DO   meloxicam (MOBIC) 7.5 MG tablet TAKE ONE TABLET BY MOUTH DAILY Yes Mavis Platt, DO butalbital-acetaminophen-caffeine (FIORICET, ESGIC) -40 MG per tablet Take 1 tablet by mouth every 6 hours as needed for Headaches Yes Angel Mckeon DO   desloratadine (CLARINEX) 5 MG tablet Take 1 tablet by mouth daily Yes Angel Mckeon DO   omeprazole (PRILOSEC) 40 MG delayed release capsule TAKE ONE CAPSULE BY MOUTH EVERY MORNING BEFORE BREAKFAST Yes Angel Mckeon DO   diazePAM (VALIUM PO) Take by mouth Yes Historical Provider, MD   cloNIDine (CATAPRES) 0.3 MG tablet TAKE THREE TABLETS BY MOUTH EVERY EVENING Yes Angel Mckeon DO   ketoconazole (NIZORAL) 2 % cream Apply topically daily. Yes Angel Mckeon DO          Patient-Reported Vitals 8/13/2021   Patient-Reported Weight 193   Patient-Reported Height 5'7\" 1/2   Patient-Reported Pulse 79   Patient-Reported Temperature -   Patient-Reported SpO2 98   Patient-Reported Peak Flow 7/13/21      There were no vitals filed for this visit.    Wt Readings from Last 3 Encounters:   07/28/21 193 lb (87.5 kg)   06/07/21 187 lb 6.4 oz (85 kg)   12/15/20 187 lb (84.8 kg)     BP Readings from Last 3 Encounters:   07/28/21 124/76   06/07/21 120/78   12/15/20 115/78       Patient Active Problem List   Diagnosis    Psychophysiological insomnia    Adjustment disorder with anxiety    Former smoker       Immunization History   Administered Date(s) Administered    COVID-19, Brewer Peter, PF, 30mcg/0.3mL 03/12/2021, 04/02/2021    Influenza, Quadv, IM, PF (6 mo and older Fluzone, Flulaval, Fluarix, and 3 yrs and older Afluria) 10/15/2018, 10/14/2019    MMR 10/14/2019    Tdap (Boostrix, Adacel) 10/14/2019       Past Medical History:   Diagnosis Date    Depression     EDS (Tiffany-Danlos syndrome)     Osteoarthritis      Past Surgical History:   Procedure Laterality Date    ABDOMINOPLASTY  2013    GASTRECTOMY  2018    partial-band removed    GASTRIC BAND  2004    LUMBAR DISCECTOMY Bilateral     L 4/5- 2013- [address] PATELLA FRACTURE SURGERY  2001    TIBIA FRACTURE SURGERY      tibial plateu fx    TUBAL LIGATION      WRIST SURGERY Left     1997     Family History   Problem Relation Age of Onset    Arthritis Mother      Social History     Socioeconomic History    Marital status:      Spouse name: Not on file    Number of children: Not on file    Years of education: Not on file    Highest education level: Not on file   Occupational History    Not on file   Tobacco Use    Smoking status: Former Smoker     Packs/day: 0.20    Smokeless tobacco: Never Used    Tobacco comment: pt. quite a mth ago   Vaping Use    Vaping Use: Never used   Substance and Sexual Activity    Alcohol use: Yes     Comment: bottle of wine    Drug use: No    Sexual activity: Not on file   Other Topics Concern    Not on file   Social History Narrative    Not on file     Social Determinants of Health     Financial Resource Strain: Low Risk     Difficulty of Paying Living Expenses: Not hard at all   Food Insecurity: No Food Insecurity    Worried About [address] in the Last Year: Never true    [facility] in the Last Year: Never true   Transportation Needs: No Transportation Needs    Lack of Transportation (Medical): No    Lack of Transportation (Non-Medical):  No   Physical Activity:     Days of Exercise per Week:     Minutes of Exercise per Session:    Stress:     Feeling of Stress :    Social Connections:     Frequency of Communication with Friends and Family:     Frequency of Social Gatherings with Friends and Family:     Attends Worship Services:     Active Member of Clubs or Organizations:     Attends Club or Organization Meetings:     Marital Status:    Intimate Partner Violence:     Fear of Current or Ex-Partner:     Emotionally Abused:     Physically Abused:     Sexually Abused:        O: LMP 07/14/2021   Physical Exam  PHYSICAL EXAMINATION:  [ INSTRUCTIONS:  \"[x]\" Indicates a positive item  \"[]\" Indicates a negative item  -- DELETE ALL ITEMS NOT EXAMINED]  Vital Signs: (As obtained by patient/caregiver or practitioner observation)    Constitutional: [x] Appears well-developed and well-nourished [x] No apparent distress      [] Abnormal-   Mental status  [x] Alert and awake  [x] Oriented to person/place/time [x]Able to follow commands      Eyes:  EOM    [x]  Normal  [] Abnormal-  Sclera  [x]  Normal  [] Abnormal -         Discharge [x]  None visible  [] Abnormal -    HENT:   [x] Normocephalic, atraumatic. [] Abnormal   [] Mouth/Throat: Mucous membranes are moist.     External Ears [x] Normal  [] Abnormal-     Neck: [x] No visualized mass     Pulmonary/Chest: [x] Respiratory effort normal.  [x] No visualized signs of difficulty breathing or respiratory distress        [] Abnormal-      Musculoskeletal:   [] Normal gait with no signs of ataxia         [x] Normal range of motion of neck        [] Abnormal-       Neurological:        [x] No Facial Asymmetry (Cranial nerve 7 motor function) (limited exam to video visit)          [x] No gaze palsy        [] Abnormal-         Skin:        [x] No significant exanthematous lesions or discoloration noted on facial skin         [] Abnormal-            Psychiatric:       [x] Normal Affect [] No Hallucinations        [] Abnormal-     Other pertinent observable physical exam findings- n/a    Due to this being a TeleHealth encounter, evaluation of the following organ systems is limited: Vitals/Constitutional/EENT/Resp/CV/GI//MS/Neuro/Skin/Heme-Lymph-Imm. ASSESSMENT   Diagnosis Orders   1. Edema, unspecified type     2. Anxiety and depression  diazePAM (VALIUM) 5 MG tablet   3. Other fatigue  CBC    TSH with Reflex    COMPREHENSIVE METABOLIC PANEL    VITAMIN B12   4. Insomnia, unspecified type  Suvorexant 20 MG TABS     #1:  Possibly multifactorial including the summer heat.   Patient agreeable to get lab work to check electrolytes and vitamin level for #1 and #3.  #2: The current medical regimen is effective;  continue present plan and medications. Pt aware of need for every 3 month medication followup appointments, and that medication refills for benzodiazepines, narcotics and/or stimulants will only be given at appointment. The risks, benefits, potential side effects and barriers to medication use were addressed today. Understanding was acknowledged. Patient asked to follow-up if condition(s) do not improve as anticipated. #4:  She would like to try a different sleep aid. PLAN          Time spent on encounter (including any number of the following: review of labs, imaging, provider notes, outside hospital records; performing examination/evaluation; counseling patient and family; ordering medications/tests; placing referrals and communication with referring physicians; coordination of care, and documentation in the EHR): 24 minutes  Established E/M: 10-19 (73781), 20-29 (12382), 30-39 (48599), 40-54 (88798)   New E/M: 15-29 (22814), 30-44 (08237), 45-59 (58535), 60-74 (32132)  Telephone E/M: 5-10 ([employer]), 11-20 (48684), 21-30 (84608)    If applicable, see additional patient information and instructions under \"Patient Instructions. \"    Return in about 4 weeks (around 9/10/2021) for ADHD and swelling. .  Patient Instructions     For discussion of setting up possible tests/treatments or referrals, an appointment is better suited to review this instead of the [employer]. Options to set up an appointment include:    --Using an active [facility] account  --Logging on [facility] [email or web address] com, searching \"[employer]\" and setting up appointment  --or calling the scheduling line at [phone]. If appointment times are not available for your preferred day and time, then call the clinic back at [phone] or send a [facility] message and the clinic team will assist.    Thank you.     Dr. Jean Claude Fuchs            Please note a portion of this chart was generated using dragon dictation software. Although every effort was made to ensure the accuracy of this automated transcription, some errors in transcription may have occurred. Pursuant to the emergency declaration under the [address] [place] [address], [facility] waiver authority and the [employer] Act, this Virtual  Visit was conducted, with patient's consent, to reduce the patient's risk of exposure to COVID-19 and provide continuity of care for an established patient. Services were provided through a video synchronous discussion virtually to substitute for in-person clinic visit. Patient was instructed that the AVS is available on My Chart or was emailed to the patient if not on My Chart. Lab orders were emailed to patient if they do not use a [facility] lab. Any work notes were sent to patient through My Chart or email.

## 2021-08-13 NOTE — PATIENT INSTRUCTIONS
For discussion of setting up possible tests/treatments or referrals, an appointment is better suited to review this instead of the [employer]. Options to set up an appointment include:    --Using an active [facility] account  --Logging on [facility] Poll Everywhere, searching \"[employer]\" and setting up appointment  --or calling the scheduling line at [phone]. If appointment times are not available for your preferred day and time, then call the clinic back at [phone] or send a [facility] message and the clinic team will assist.    Thank you.     Dr. Dayanara Cortes

## 2021-08-21 DIAGNOSIS — F43.22 ADJUSTMENT DISORDER WITH ANXIETY: ICD-10-CM

## 2021-08-23 RX ORDER — CLONIDINE HYDROCHLORIDE 0.3 MG/1
TABLET ORAL
Qty: 90 TABLET | Refills: 10 | Status: SHIPPED | OUTPATIENT
Start: 2021-08-23 | End: 2022-08-05 | Stop reason: SDUPTHER

## 2021-09-01 ENCOUNTER — OFFICE VISIT (OUTPATIENT)
Dept: FAMILY MEDICINE CLINIC | Age: 46
End: 2021-09-01
Payer: OTHER GOVERNMENT

## 2021-09-01 ENCOUNTER — TELEPHONE (OUTPATIENT)
Dept: FAMILY MEDICINE CLINIC | Age: 46
End: 2021-09-01

## 2021-09-01 VITALS
TEMPERATURE: 96.8 F | HEART RATE: 88 BPM | SYSTOLIC BLOOD PRESSURE: 92 MMHG | DIASTOLIC BLOOD PRESSURE: 56 MMHG | RESPIRATION RATE: 16 BRPM | OXYGEN SATURATION: 98 % | WEIGHT: 192.8 LBS | BODY MASS INDEX: 29.32 KG/M2

## 2021-09-01 DIAGNOSIS — F90.9 ADULT ADHD: ICD-10-CM

## 2021-09-01 DIAGNOSIS — F32.A ANXIETY AND DEPRESSION: ICD-10-CM

## 2021-09-01 DIAGNOSIS — G25.81 RLS (RESTLESS LEGS SYNDROME): Primary | ICD-10-CM

## 2021-09-01 DIAGNOSIS — F41.9 ANXIETY AND DEPRESSION: ICD-10-CM

## 2021-09-01 DIAGNOSIS — R25.2 THIGH CRAMP: ICD-10-CM

## 2021-09-01 PROCEDURE — 99214 OFFICE O/P EST MOD 30 MIN: CPT | Performed by: FAMILY MEDICINE

## 2021-09-01 RX ORDER — ATOMOXETINE 40 MG/1
40 CAPSULE ORAL DAILY
Qty: 30 CAPSULE | Refills: 5 | Status: SHIPPED | OUTPATIENT
Start: 2021-09-01 | End: 2021-11-22

## 2021-09-01 RX ORDER — CETIRIZINE HYDROCHLORIDE 10 MG/1
10 TABLET ORAL DAILY
COMMUNITY
End: 2021-10-01

## 2021-09-01 RX ORDER — DIAZEPAM 5 MG/1
5 TABLET ORAL EVERY 6 HOURS PRN
Qty: 60 TABLET | Refills: 2 | Status: SHIPPED | OUTPATIENT
Start: 2021-09-01 | End: 2022-02-18 | Stop reason: SDUPTHER

## 2021-09-01 NOTE — TELEPHONE ENCOUNTER
Prior authorization needed for Belsomra. My patient has tried different sleep aids including Ambien, Lunesta and recently clonidine. Has also taken over-the-counterS without any benefit including melatonin and generic Benadryl. Please route to our PA department to see if we can get approval soon for Belsomra. Please update patient and me in the next 1 to 2 days.

## 2021-09-01 NOTE — PROGRESS NOTES
[facility]  Progress Note  DO Kyle Bae  [birth year] 09/02/21    Chief Complaint:   Kyle Andrade is a 54 y.o. female who is here for leg and thigh cramping along with ADHD and mood        HPI:   Concerned stimulant can worse insomnia. Would like to take other medication to help her ADHD. ADHD is known in the family including her daughter. Clonidine is not recently helping. She is interested in [place]. Known to have ongoing anxiety. Known to have ongoing insomnia. Has tried different medications for this including hypnotics Ambien, Lunesta. Has tried over-the-counter such as melatonin and diphenhydramine. These have not been successful long-term. Is experiencing leg cramps but more recently lateral thigh cramps. She is not sure of the cause. She is not known to be exclusively vegan or vegetarian. His cramps at times disturb her sleep. No recent injury. ROS negative for headache, visionchanges, chest pain, shortness of breath, abdominal pain, urinary sx, bowel changes. Past medical, surgical, and social history reviewed. and allergies reviewed. Allergies   Allergen Reactions    Compazine [Prochlorperazine Maleate] Other (See Comments)     Makes her \"crazy\"     Prior to Visit Medications    Medication Sig Taking? Authorizing Provider   cetirizine (ZYRTEC) 10 MG tablet Take 10 mg by mouth daily Yes Historical Provider, MD   diazePAM (VALIUM) 5 MG tablet Take 1 tablet by mouth every 6 hours as needed for Anxiety for up to 30 days.  Yes Jackson Mckeon DO   atomoxetine (STRATTERA) 40 MG capsule Take 1 capsule by mouth daily Yes Jackson Mckeon DO   cloNIDine (CATAPRES) 0.3 MG tablet TAKE THREE TABLETS BY MOUTH EVERY EVENING Yes Jackson Mckeon DO   buPROPion (WELLBUTRIN XL) 300 MG extended release tablet TAKE ONE TABLET BY MOUTH EVERY MORNING Yes Dl Mckeon, DO   meloxicam (MOBIC) 7.5 MG tablet TAKE ONE TABLET BY MOUTH DAILY Yes Filemon Mckeon DO   butalbital-acetaminophen-caffeine (FIORICET, ESGIC) -40 MG per tablet Take 1 tablet by mouth every 6 hours as needed for Headaches Yes Brenda Paulino,    omeprazole (PRILOSEC) 40 MG delayed release capsule TAKE ONE CAPSULE BY MOUTH EVERY MORNING BEFORE BREAKFAST Yes Filemon Mckeon DO          Vitals:    09/01/21 1112   BP: (!) 92/56   Pulse: 88   Resp: 16   Temp: 96.8 °F (36 °C)   TempSrc: Tympanic   SpO2: 98%   Weight: 192 lb 12.8 oz (87.5 kg)      Wt Readings from Last 3 Encounters:   09/01/21 192 lb 12.8 oz (87.5 kg)   07/28/21 193 lb (87.5 kg)   06/07/21 187 lb 6.4 oz (85 kg)     BP Readings from Last 3 Encounters:   09/01/21 (!) 92/56   07/28/21 124/76   06/07/21 120/78       Patient Active Problem List   Diagnosis    Psychophysiological insomnia    Adjustment disorder with anxiety    Former smoker       Immunization History   Administered Date(s) Administered    COVID-19, Pfizer, PF, 30mcg/0.3mL 03/12/2021, 04/02/2021    Influenza, Quadv, IM, PF (6 mo and older Fluzone, Flulaval, Fluarix, and 3 yrs and older Afluria) 10/15/2018, 10/14/2019    MMR 10/14/2019    Tdap (Boostrix, Adacel) 10/14/2019       Past Medical History:   Diagnosis Date    Depression     EDS (Tiffany-Danlos syndrome)     Osteoarthritis      Past Surgical History:   Procedure Laterality Date    ABDOMINOPLASTY  2013    GASTRECTOMY  2018    partial-band removed    GASTRIC BAND  2004    LUMBAR DISCECTOMY Bilateral     L 4/5- 2013- [address]    PATELLA FRACTURE SURGERY  2001    TIBIA FRACTURE SURGERY      tibial plateu fx    TUBAL LIGATION      WRIST SURGERY Left     1997     Family History   Problem Relation Age of Onset    Arthritis Mother      Social History     Socioeconomic History    Marital status:      Spouse name: Not on file    Number of children: Not on file    Years of education: Not on file    Highest education level: Not on file Occupational History    Not on file   Tobacco Use    Smoking status: Former Smoker     Packs/day: 0.20    Smokeless tobacco: Never Used    Tobacco comment: pt. quite a mth ago   Vaping Use    Vaping Use: Never used   Substance and Sexual Activity    Alcohol use: Yes     Comment: bottle of wine    Drug use: No    Sexual activity: Not on file   Other Topics Concern    Not on file   Social History Narrative    Not on file     Social Determinants of Health     Financial Resource Strain: Low Risk     Difficulty of Paying Living Expenses: Not hard at all   Food Insecurity: No Food Insecurity    Worried About Running Out of Food in the Last Year: Never true    [address] in the Last Year: Never true   Transportation Needs: No Transportation Needs    Lack of Transportation (Medical): No    Lack of Transportation (Non-Medical): No   Physical Activity:     Days of Exercise per Week:     Minutes of Exercise per Session:    Stress:     Feeling of Stress :    Social Connections:     Frequency of Communication with Friends and Family:     Frequency of Social Gatherings with Friends and Family:     Attends Christian Services:     Active Member of Clubs or Organizations:     Attends Club or Organization Meetings:     Marital Status:    Intimate Partner Violence:     Fear of Current or Ex-Partner:     Emotionally Abused:     Physically Abused:     Sexually Abused:        O: BP (!) 92/56   Pulse 88   Temp 96.8 °F (36 °C) (Tympanic)   Resp 16   Wt 192 lb 12.8 oz (87.5 kg)   LMP 08/16/2021 (Approximate)   SpO2 98%   Breastfeeding No   BMI 29.32 kg/m²   Physical Exam  GEN: No acute distress,cooperative, well nourished, alert. HEENT: PEERLA, EOMI , normocephalic/atraumatic, external nose appears normal.  External ear is normal.    Neck: soft, supple, no appreciable thyromegaly,mass  CV: No upper extremity edema. Resp:  Breathing comfortably. Psych:normal affect.   Neuro: AOx3  Other Pertinent Physical Exam findings:   Heart: Normal S1 and S2 with regular rhythm. Lungs: Clear to auscultation bilaterally. ASSESSMENT   Diagnosis Orders   1. RLS (restless legs syndrome)  IRON AND TIBC   2. Anxiety and depression  diazePAM (VALIUM) 5 MG tablet   3. Adult ADHD  atomoxetine (STRATTERA) 40 MG capsule   4. Thigh cramp         We will look into different causes for #1 and #4 and evaluate for possible deficiencies of iron and electrolytes. Hold off from RLS medications at this time. #2 and #3: Prescribe Strattera and verify is on insurance plan affordable to the patient. This would replace the Wellbutrin. If the Chandrakant Arabella is covered she knows that she could stop the Wellbutrin and start the Strattera at any point. Close follow-up is recommended. PLAN          Time spent on encounter (including any number of the following: review of labs, imaging, provider notes, outside hospital records; performing examination/evaluation; counseling patient and family; ordering medications/tests; placing referrals and communication with referring physicians; coordination of care, and documentation in the EHR): 32 minutes  Established E/M: 10-19 (62506), 20-29 (37242), 30-39 (33678), 40-54 (00165)   New E/M: 15-29 (46676), 30-44 (18839), 45-59 (11851), 60-74 (31483)  Telephone E/M: 5-10 (Deep), 11-20 (06361), 21-30 (65466)    If applicable, see additional patient information and instructions under \"Patient Instructions. \"    Return for ADHD in 4 to 6 weeks. Patient Instructions   GET LAB WORK DONE. THERE ARE MEDICATIONS FOR RESTLESS LEGS AND SWELLING    STRATERRA IS PRECRIBED AT STARTING DOSE. IF NOT IMPROVING ADHD AFTER 3 TO 4 WEEKS, UPDATE YOUR PCP BY PHONE OR MYCHART TO INCREASE DOSING. Goal is that insurance will cover Belsomra (to replace clonidine) and that [place] will replace Wellbutrin. Please note a portion of this chart was generated using dragon dictation software. Although every effort was made to ensure the accuracy of this automated transcription,some errors in transcription may have occurred.

## 2021-09-01 NOTE — PATIENT INSTRUCTIONS
GET LAB WORK DONE. THERE ARE MEDICATIONS FOR RESTLESS LEGS AND SWELLING    STRATERRA IS PRECRIBED AT STARTING DOSE. IF NOT IMPROVING ADHD AFTER 3 TO 4 WEEKS, UPDATE YOUR PCP BY PHONE OR MYCHART TO INCREASE DOSING. Goal is that insurance will cover Belsomra (to replace clonidine) and that Gladis Francoise will replace Wellbutrin.

## 2021-09-02 NOTE — TELEPHONE ENCOUNTER
PA submitted via [facility] for Belsomra 20MG tablets.   Key: [id number] - PA Case ID: [id number] - Rx #: [id number]    STATUS: PENDING/MARKED AS URGENT

## 2021-09-03 NOTE — TELEPHONE ENCOUNTER
Received APPROVAL for Belsomra 20MG tablets from 08/10/2021 through 03/03/2022. Approval letter attached. Please advise patient. Thank you!

## 2021-09-14 ENCOUNTER — HOSPITAL ENCOUNTER (EMERGENCY)
Age: 46
Discharge: HOME | End: 2021-09-14
Attending: EMERGENCY MEDICINE
Payer: OTHER GOVERNMENT

## 2021-09-14 VITALS
HEART RATE: 93 BPM | RESPIRATION RATE: 16 BRPM | BODY MASS INDEX: 28.95 KG/M2 | TEMPERATURE: 98.2 F | WEIGHT: 191 LBS | OXYGEN SATURATION: 97 % | HEIGHT: 68 IN | SYSTOLIC BLOOD PRESSURE: 143 MMHG | DIASTOLIC BLOOD PRESSURE: 96 MMHG

## 2021-09-14 DIAGNOSIS — L03.113 CELLULITIS OF ARM, RIGHT: Primary | ICD-10-CM

## 2021-09-14 PROCEDURE — 90715 TDAP VACCINE 7 YRS/> IM: CPT | Performed by: EMERGENCY MEDICINE

## 2021-09-14 PROCEDURE — 6360000002 HC RX W HCPCS: Performed by: EMERGENCY MEDICINE

## 2021-09-14 PROCEDURE — 99283 EMERGENCY DEPT VISIT LOW MDM: CPT

## 2021-09-14 PROCEDURE — 90471 IMMUNIZATION ADMIN: CPT | Performed by: EMERGENCY MEDICINE

## 2021-09-14 RX ORDER — SULFAMETHOXAZOLE AND TRIMETHOPRIM 800; 160 MG/1; MG/1
1 TABLET ORAL 2 TIMES DAILY
Qty: 20 TABLET | Refills: 0 | Status: SHIPPED | OUTPATIENT
Start: 2021-09-14 | End: 2021-09-24

## 2021-09-14 RX ORDER — CEPHALEXIN 500 MG/1
500 CAPSULE ORAL 3 TIMES DAILY
Qty: 30 CAPSULE | Refills: 0 | Status: SHIPPED | OUTPATIENT
Start: 2021-09-14 | End: 2021-09-24

## 2021-09-14 RX ORDER — DIAPER,BRIEF,INFANT-TODD,DISP
EACH MISCELLANEOUS
Qty: 30 G | Refills: 1 | Status: SHIPPED | OUTPATIENT
Start: 2021-09-14 | End: 2021-10-01 | Stop reason: ALTCHOICE

## 2021-09-14 RX ADMIN — TETANUS TOXOID, REDUCED DIPHTHERIA TOXOID AND ACELLULAR PERTUSSIS VACCINE, ADSORBED 0.5 ML: 5; 2.5; 8; 8; 2.5 SUSPENSION INTRAMUSCULAR at 16:43

## 2021-09-14 ASSESSMENT — PAIN DESCRIPTION - ORIENTATION
ORIENTATION: LEFT;MID
ORIENTATION: RIGHT;MID

## 2021-09-14 ASSESSMENT — PAIN DESCRIPTION - DESCRIPTORS
DESCRIPTORS: ITCHING
DESCRIPTORS: ITCHING

## 2021-09-14 ASSESSMENT — PAIN DESCRIPTION - PAIN TYPE: TYPE: ACUTE PAIN

## 2021-09-14 ASSESSMENT — PAIN DESCRIPTION - LOCATION
LOCATION: ARM
LOCATION: ARM

## 2021-09-14 ASSESSMENT — PAIN - FUNCTIONAL ASSESSMENT: PAIN_FUNCTIONAL_ASSESSMENT: 0-10

## 2021-09-14 ASSESSMENT — PAIN SCALES - GENERAL
PAINLEVEL_OUTOF10: 10
PAINLEVEL_OUTOF10: 10

## 2021-09-16 NOTE — ED PROVIDER NOTES
TRIAGE CHIEF COMPLAINT:   Chief Complaint   Patient presents with   [facility] 82     pt reports does alot of hiking. sts noticed a small bump right mid arm last week. then started with surrounding redness. noticed red streak going up right upper arm yesterday. no fever         HPI: Max Miramontes is a 54 y.o. female who presents to the Emergency Department with complaint of a red rash on her right arm in the antecubital region. She states she has been outside walking and was stung or bitten by insect several times on the right arm last week. Today she noted some redness in the antecubital region and a small streak. She complains of itching. No fever or chills. Denies any other rash. Tetanus is not up-to-date. REVIEW OF SYSTEMS:  6 systems reviewed. Pertinent positives per HPI. Otherwise noted to be negative. Nursing notes reviewed and agree with above. Past medical/surgical history reviewed. MEDICATIONS   Discharge Medication List as of 9/14/2021  4:46 PM      START taking these medications    Details   cephALEXin (KEFLEX) 500 MG capsule Take 1 capsule by mouth 3 times daily for 10 days, Disp-30 capsule, R-0Normal      sulfamethoxazole-trimethoprim (BACTRIM DS) 800-160 MG per tablet Take 1 tablet by mouth 2 times daily for 10 days, Disp-20 tablet, R-0Normal      hydrocortisone (ALA-LILA) 1 % cream Apply topically 2 times daily prn irritation or itching, Disp-30 g, R-1, Normal         CONTINUE these medications which have NOT CHANGED    Details   diazePAM (VALIUM) 5 MG tablet Take 1 tablet by mouth every 6 hours as needed for Anxiety for up to 30 days. , Disp-60 tablet, R-2Normal      atomoxetine (STRATTERA) 40 MG capsule Take 1 capsule by mouth daily, Disp-30 capsule, R-5Normal      cloNIDine (CATAPRES) 0.3 MG tablet TAKE THREE TABLETS BY MOUTH EVERY EVENING, Disp-90 tablet, R-10Normal      meloxicam (MOBIC) 7.5 MG tablet TAKE ONE TABLET BY MOUTH DAILY, Disp-30 tablet, R-4Normal      omeprazole (PRILOSEC) 40 MG delayed release capsule TAKE ONE CAPSULE BY MOUTH EVERY MORNING BEFORE BREAKFAST, Disp-30 capsule, R-5Normal      cetirizine (ZYRTEC) 10 MG tablet Take 10 mg by mouth dailyHistorical Med      buPROPion (WELLBUTRIN XL) 300 MG extended release tablet TAKE ONE TABLET BY MOUTH EVERY MORNING, Disp-30 tablet, R-5Normal      butalbital-acetaminophen-caffeine (FIORICET, ESGIC) -40 MG per tablet Take 1 tablet by mouth every 6 hours as needed for Headaches, Disp-15 tablet, R-0Normal               ALLERGIES   Allergies   Allergen Reactions    Compazine [Prochlorperazine Maleate] Other (See Comments)     Makes her \"crazy\"         BP (!) 143/96   Pulse 93   Temp 98.2 °F (36.8 °C) (Oral)   Resp 16   Ht 5' 7.5\" (1.715 m)   Wt 191 lb (86.6 kg)   LMP 09/11/2021   SpO2 97%   BMI 29.47 kg/m²   General:  No acute distress. Non toxic appearance  Head:   Normocephalic and atraumatic  Eyes:   Conjunctiva clear, TITO, EOM's intact. Sclera anicteric. ENT:   Mucous membranes moist  Neck:   Supple. No adenopathy. Lungs/Chest:  No respiratory distress  CVS:   Regular rate and rhythm  Extremities:  Full range of motion  Skin:   There is a small localized area of erythema with several small crusted papules on the right proximal forearm. There is a small red streak in the antecubital region of the right arm measuring 5 cm. No evidence of abscess or crepitance. No drainage. Distal neurovascular exams intact. The area is minimally warm. Neuro:  Alert and OX3. Speech clear and appropriate. No extremity weakness. Normal sensation in all extremities. No facial asymmetry. Gait normal.  Psych:   Affect normal. Mood normal        RADIOLOGY:      LAB      ED COURSE / MDM:  30-year-old female with what appears to be early cellulitis likely due to arthropod envenomation on the right arm that occurred last week. She has a small streak in the antecubital region. No evidence of abscess.   Distal neurovascular exam is intact. There is no ECM type rash. No tick exposure. She will be treated with Keflex and Bactrim. I recommended hydrocortisone cream to the area twice a day if needed for itching. Suggested she could continue to use Benadryl also if needed for itching. Advise follow-up with her primary care doctor if symptoms worsen. I discussed with Janiya Montgomery the results of evaluation in the Emergency Department, diagnosis, care and prognosis. The plan is to discharge to home. The patient is in agreement with the plan and questions have been answered. I also discussed with the patient and/or family the reasons which may require a return visit and the importance of follow-up care.        (Please note that portions of this note may have been completed with a voice recognition program.  Efforts were made to edit the dictation but occasionally words are mis-transcribed)        FINAL IMPRESSION:  1 --cellulitis of right arm                  Beatriz Rubio MD  09/16/21 5228

## 2021-10-01 ENCOUNTER — VIRTUAL VISIT (OUTPATIENT)
Dept: FAMILY MEDICINE CLINIC | Age: 46
End: 2021-10-01
Payer: OTHER GOVERNMENT

## 2021-10-01 DIAGNOSIS — L03.113 RIGHT ARM CELLULITIS: Primary | ICD-10-CM

## 2021-10-01 PROCEDURE — 99212 OFFICE O/P EST SF 10 MIN: CPT | Performed by: FAMILY MEDICINE

## 2021-10-01 NOTE — PROGRESS NOTES
[facility]  TELEMEDICINE Progress Note  Vidhya Yanez DO      The risks and benefits of converting to a virtual visit were discussed in light of the current infectious disease epidemic. Patient also understood that insurance coverage and co-pays are up to their individual insurance plans. Patient identification was verified at the start of the visit. Jody Alejandro  1975    10/01/21    Patient location: Home address on file  Provider location: [de-identified] home    Chief Complaint:   Jody Alejandro is a 54 y.o. patient who is here for follow-up on her cellulitis. HPI:   It has been approximately 1 week since she last took the 2 antibiotics. She did have intense chills dizziness and nausea with vomiting 48 to 72 hours ago. This was approximately 3 days after she had finished antibiotics for right arm cellulitis. She has felt much better in the last 24 hours. There is a family history of a sulfa allergy in her mother who describes the sulfa allergy as hives. ROS negative for headache, vision changes, chest pain, shortness of breath, abdominal pain, urinary sx, bowel changes. Past medical, surgical, and social history reviewed. Medications and allergies reviewed. Allergies   Allergen Reactions    Compazine [Prochlorperazine Maleate] Other (See Comments)     Makes her \"crazy\"     Prior to Visit Medications    Medication Sig Taking? Authorizing Provider   diazePAM (VALIUM) 5 MG tablet Take 1 tablet by mouth every 6 hours as needed for Anxiety for up to 30 days.  Yes Herson Brand Bingcang, DO   atomoxetine (STRATTERA) 40 MG capsule Take 1 capsule by mouth daily Yes Herson Brand Bingcang, DO   cloNIDine (CATAPRES) 0.3 MG tablet TAKE THREE TABLETS BY MOUTH EVERY EVENING Yes Herson Brand Bingcang, DO   meloxicam (MOBIC) 7.5 MG tablet TAKE ONE TABLET BY MOUTH DAILY Yes Herson Brand Bingcang, DO   butalbital-acetaminophen-caffeine (FIORICET, ESGIC) -40 MG per tablet Take 1 tablet by mouth every 6 hours as needed for Headaches Yes Bri Mckeon,    omeprazole (PRILOSEC) 40 MG delayed release capsule TAKE ONE CAPSULE BY MOUTH EVERY MORNING BEFORE BREAKFAST Yes Bri Mckeon DO          Patient-Reported Vitals 10/1/2021   Patient-Reported Weight 184   Patient-Reported Height 5'8   Patient-Reported Pulse 86   Patient-Reported Temperature -   Patient-Reported SpO2 99   Patient-Reported Peak Flow -      There were no vitals filed for this visit.    Wt Readings from Last 3 Encounters:   09/14/21 191 lb (86.6 kg)   09/01/21 192 lb 12.8 oz (87.5 kg)   07/28/21 193 lb (87.5 kg)     BP Readings from Last 3 Encounters:   09/14/21 (!) 143/96   09/01/21 (!) 92/56   07/28/21 124/76       Patient Active Problem List   Diagnosis    Psychophysiological insomnia    Adjustment disorder with anxiety    Former smoker       Immunization History   Administered Date(s) Administered    COVID-19, Pfizer, PF, 30mcg/0.3mL 03/12/2021, 04/02/2021    Influenza, Quadv, IM, PF (6 mo and older Fluzone, Flulaval, Fluarix, and 3 yrs and older Afluria) 10/15/2018, 10/14/2019    MMR 10/14/2019    Tdap (Boostrix, Adacel) 10/14/2019, 09/14/2021       Past Medical History:   Diagnosis Date    Depression     EDS (Tiffany-Danlos syndrome)     Osteoarthritis      Past Surgical History:   Procedure Laterality Date    ABDOMINOPLASTY  2013    GASTRECTOMY  2018    partial-band removed    GASTRIC BAND  2004    LUMBAR DISCECTOMY Bilateral     L 4/5- 2013- [address]  2001    TIBIA FRACTURE SURGERY      tibial plateu fx    TUBAL LIGATION      WRIST SURGERY Left     1997     Family History   Problem Relation Age of Onset    Arthritis Mother      Social History     Socioeconomic History    Marital status:      Spouse name: Not on file    Number of children: Not on file    Years of education: Not on file    Highest education level: Not on file   Occupational History    Not on file   Tobacco Use    Smoking status: Former Smoker     Packs/day: 0.20    Smokeless tobacco: Never Used    Tobacco comment: pt. quite a mth ago   Vaping Use    Vaping Use: Never used   Substance and Sexual Activity    Alcohol use: Yes     Comment: couple glasses wine nite    Drug use: No    Sexual activity: Not on file   Other Topics Concern    Not on file   Social History Narrative    Not on file     Social Determinants of Health     Financial Resource Strain: Low Risk     Difficulty of Paying Living Expenses: Not hard at all   Food Insecurity: No Food Insecurity    Worried About [address] [email or web address] in the Last Year: Never true    [facility] in the Last Year: Never true   Transportation Needs: No Transportation Needs    Lack of Transportation (Medical): No    Lack of Transportation (Non-Medical):  No   Physical Activity:     Days of Exercise per Week:     Minutes of Exercise per Session:    Stress:     Feeling of Stress :    Social Connections:     Frequency of Communication with Friends and Family:     Frequency of Social Gatherings with Friends and Family:     Attends Rastafari Services:     Active Member of Clubs or Organizations:     Attends Club or Organization Meetings:     Marital Status:    Intimate Partner Violence:     Fear of Current or Ex-Partner:     Emotionally Abused:     Physically Abused:     Sexually Abused:        O: LMP 09/11/2021   Physical Exam  PHYSICAL EXAMINATION:  [ INSTRUCTIONS:  \"[x]\" Indicates a positive item  \"[]\" Indicates a negative item  -- DELETE ALL ITEMS NOT EXAMINED]  Vital Signs: (As obtained by patient/caregiver or practitioner observation)    Constitutional: [x] Appears well-developed and well-nourished [x] No apparent distress      [] Abnormal-   Mental status  [x] Alert and awake  [x] Oriented to person/place/time [x]Able to follow commands      Eyes:  EOM    [x]  Normal  [] Abnormal-  Sclera  [x]  Normal  [] Abnormal - Discharge [x]  None visible  [x] Abnormal -    HENT:   [x] Normocephalic, atraumatic. [] Abnormal   [] Mouth/Throat: Mucous membranes are moist.     External Ears [x] Normal  [] Abnormal-     Neck: [x] No visualized mass     Pulmonary/Chest: [x] Respiratory effort normal.  [x] No visualized signs of difficulty breathing or respiratory distress        [] Abnormal-      Musculoskeletal:   [] Normal gait with no signs of ataxia         [x] Normal range of motion of neck        [] Abnormal-       Neurological:        [x] No Facial Asymmetry (Cranial nerve 7 motor function) (limited exam to video visit)          [x] No gaze palsy        [] Abnormal-         Skin:        [x] No significant exanthematous lesions or discoloration noted on facial skin         [] Abnormal-            Psychiatric:       [x] Normal Affect [] No Hallucinations        [] Abnormal-     Other pertinent observable physical exam findings-minimal redness on the right upper arm    Due to this being a TeleHealth encounter, evaluation of the following organ systems is limited: Vitals/Constitutional/EENT/Resp/CV/GI//MS/Neuro/Skin/Heme-Lymph-Imm. ASSESSMENT   Diagnosis Orders   1. Right arm cellulitis       Resolved. Her constellation of symptoms may have been due to side effect of the Bactrim. I did not see any interactions with the Strattera Belsomra cephalexin and Bactrim.     PLAN          Time spent on encounter (including any number of the following: review of labs, imaging, provider notes, outside hospital records; performing examination/evaluation; counseling patient and family; ordering medications/tests; placing referrals and communication with referring physicians; coordination of care, and documentation in the EHR): 10 minutes  Established E/M: 10-19 (78175), 20-29 (57114), 30-39 (29146), 40-54 (00717)   New E/M: 15-29 (90145), 30-44 (40456), 45-59 (48850), 60-74 (75041)  Telephone E/M: 5-10 (26678), 11-20 (38083), 21-30 (99836)    If applicable, see additional patient information and instructions under \"Patient Instructions. \"    No follow-ups on file. There are no Patient Instructions on file for this visit. Please note a portion of this chart was generated using dragon dictation software. Although every effort was made to ensure the accuracy of this automated transcription, some errors in transcription may have occurred. Pursuant to the emergency declaration under the [address] [place] [address] [facility] authority and the [employer] and [employer] Act, this Virtual  Visit was conducted, with patient's consent, to reduce the patient's risk of exposure to COVID-19 and provide continuity of care for an established patient. Services were provided through a video synchronous discussion virtually to substitute for in-person clinic visit. Patient was instructed that the AVS is available on My Chart or was emailed to the patient if not on My Chart. Lab orders were emailed to patient if they do not use a [id number] [facility] lab. Any work notes were sent to patient through My Chart or email.

## 2021-10-17 DIAGNOSIS — K21.9 GASTROESOPHAGEAL REFLUX DISEASE, UNSPECIFIED WHETHER ESOPHAGITIS PRESENT: ICD-10-CM

## 2021-10-18 RX ORDER — OMEPRAZOLE 40 MG/1
CAPSULE, DELAYED RELEASE ORAL
Qty: 30 CAPSULE | Refills: 5 | Status: SHIPPED | OUTPATIENT
Start: 2021-10-18 | End: 2022-10-19 | Stop reason: SDUPTHER

## 2021-10-18 NOTE — TELEPHONE ENCOUNTER
Requested Prescriptions     Pending Prescriptions Disp Refills    omeprazole (PRILOSEC) 40 MG delayed release capsule [Pharmacy Med Name: OMEPRAZOLE DR 40 MG CAPSULE] 30 capsule 5     Sig: TAKE ONE CAPSULE BY MOUTH EVERY MORNING BEFORE BREAKFAST     Last ov 10/1/21 vv  Last lab 9/1/21

## 2021-10-22 ENCOUNTER — TELEPHONE (OUTPATIENT)
Dept: FAMILY MEDICINE CLINIC | Age: 46
End: 2021-10-22

## 2021-10-22 DIAGNOSIS — G47.00 INSOMNIA, UNSPECIFIED TYPE: ICD-10-CM

## 2021-10-22 NOTE — TELEPHONE ENCOUNTER
Requested Prescriptions     Pending Prescriptions Disp Refills    Suvorexant 20 MG TABS 30 tablet 0     Sig: Take 1 tablet by mouth nightly for 30 doses.      Last ov 10/1/21 vv  Last lab 9/1/21

## 2021-11-01 ENCOUNTER — HOSPITAL ENCOUNTER (EMERGENCY)
Age: 46
Discharge: HOME | End: 2021-11-01
Attending: EMERGENCY MEDICINE
Payer: OTHER GOVERNMENT

## 2021-11-01 VITALS
BODY MASS INDEX: 29.16 KG/M2 | DIASTOLIC BLOOD PRESSURE: 100 MMHG | RESPIRATION RATE: 14 BRPM | HEART RATE: 65 BPM | HEIGHT: 68 IN | OXYGEN SATURATION: 100 % | TEMPERATURE: 97.8 F | SYSTOLIC BLOOD PRESSURE: 143 MMHG | WEIGHT: 192.4 LBS

## 2021-11-01 DIAGNOSIS — R03.0 ELEVATED BLOOD PRESSURE READING: ICD-10-CM

## 2021-11-01 DIAGNOSIS — S01.81XA LACERATION OF FOREHEAD, INITIAL ENCOUNTER: Primary | ICD-10-CM

## 2021-11-01 PROCEDURE — 12011 RPR F/E/E/N/L/M 2.5 CM/<: CPT

## 2021-11-01 PROCEDURE — 99283 EMERGENCY DEPT VISIT LOW MDM: CPT

## 2021-11-01 RX ORDER — LIDOCAINE HYDROCHLORIDE AND EPINEPHRINE 10; 10 MG/ML; UG/ML
20 INJECTION, SOLUTION INFILTRATION; PERINEURAL ONCE
Status: DISCONTINUED | OUTPATIENT
Start: 2021-11-01 | End: 2021-11-01 | Stop reason: HOSPADM

## 2021-11-01 RX ORDER — SUVOREXANT 20 MG/1
TABLET, FILM COATED ORAL
COMMUNITY
Start: 2021-10-22 | End: 2021-11-22 | Stop reason: SDUPTHER

## 2021-11-01 RX ORDER — BACITRACIN, NEOMYCIN, POLYMYXIN B 400; 3.5; 5 [USP'U]/G; MG/G; [USP'U]/G
OINTMENT TOPICAL ONCE
Status: DISCONTINUED | OUTPATIENT
Start: 2021-11-01 | End: 2021-11-01 | Stop reason: HOSPADM

## 2021-11-01 ASSESSMENT — PAIN DESCRIPTION - ORIENTATION: ORIENTATION: RIGHT

## 2021-11-01 ASSESSMENT — PAIN SCALES - GENERAL: PAINLEVEL_OUTOF10: 3

## 2021-11-01 ASSESSMENT — PAIN DESCRIPTION - LOCATION: LOCATION: HEAD

## 2021-11-01 ASSESSMENT — PAIN DESCRIPTION - PAIN TYPE: TYPE: ACUTE PAIN

## 2021-11-01 ASSESSMENT — PAIN DESCRIPTION - DESCRIPTORS: DESCRIPTORS: DULL;CONSTANT

## 2021-11-01 NOTE — ED PROVIDER NOTES
on any blood thinners. Tetanus is up-to-date. Laceration was repaired in a simple fashion after negative exploration. She was given wound care and head injury instructions. Recommended Tylenol for pain and suture removal in about 6 days. I discussed with Bassam Stephens the results of evaluation in the Emergency Department, diagnosis, care and prognosis. The plan is to discharge to home. The patient is in agreement with the plan and questions have been answered. I also discussed with the patient and/or family the reasons which may require a return visit and the importance of follow-up care.        (Please note that portions of this note may have been completed with a voice recognition program.  Efforts were made to edit the dictation but occasionally words are mis-transcribed)        FINAL IMPRESSION:  1 --forehead laceration, 2 cm, simple repair  2 --elevated blood pressure reading        Kodak Vega MD  11/01/21 [address] Bouchra Solorzano MD  11/01/21 4276

## 2021-11-04 ENCOUNTER — HOSPITAL ENCOUNTER (EMERGENCY)
Age: 46
Discharge: HOME | End: 2021-11-04
Attending: EMERGENCY MEDICINE
Payer: OTHER GOVERNMENT

## 2021-11-04 VITALS
WEIGHT: 193.13 LBS | SYSTOLIC BLOOD PRESSURE: 142 MMHG | RESPIRATION RATE: 14 BRPM | OXYGEN SATURATION: 99 % | BODY MASS INDEX: 29.27 KG/M2 | HEIGHT: 68 IN | TEMPERATURE: 98.3 F | DIASTOLIC BLOOD PRESSURE: 87 MMHG | HEART RATE: 67 BPM

## 2021-11-04 DIAGNOSIS — Z87.820 HISTORY OF CLOSED HEAD INJURY: ICD-10-CM

## 2021-11-04 DIAGNOSIS — S00.83XD FACIAL CONTUSION, SUBSEQUENT ENCOUNTER: Primary | ICD-10-CM

## 2021-11-04 DIAGNOSIS — S01.81XA LACERATION OF FOREHEAD, INITIAL ENCOUNTER: ICD-10-CM

## 2021-11-04 PROCEDURE — 12001 RPR S/N/AX/GEN/TRNK 2.5CM/<: CPT

## 2021-11-04 PROCEDURE — 99283 EMERGENCY DEPT VISIT LOW MDM: CPT

## 2021-11-04 ASSESSMENT — VISUAL ACUITY
OS: 20/15
OU: 20/15
OD: 20/15

## 2021-11-04 NOTE — ED NOTES
Patient given d/c instructions with return verbalization. Emphasis on f/u, to return for SR. Patient ambulated to lobby with steady gait.      Sonali Elkins RN  11/04/21 9275

## 2021-11-04 NOTE — ED TRIAGE NOTES
Re check of L periorbital area, ^ bruising . Was seen here on Monday, sutures in forehead. Hector Orourke out of chair and head hit brick on Sunday. alert and oriented x 4.

## 2021-11-06 NOTE — ED PROVIDER NOTES
TRIAGE CHIEF COMPLAINT:   Chief Complaint   Patient presents with    Eye Problem         HPI: Iris Camara is a 51 y.o. female who presents to the Emergency Department with complaint of noticing bruising around her left eye which she first noticed yesterday. The patient was seen by myself on November 1 with a mid forehead laceration and contusion of the forehead that occurred at midnight the night before when she dozed off in a chair outside and fell hitting her head on a brick. No loss of consciousness. She does not take any blood thinners. She was neurologically intact at that time with a contusion of the forehead but no visible periorbital contusion. Her laceration was repaired. She was given head injury and laceration instructions. Patient is concerned that the bruising around her left orbit developed after the injury. She denies headache or neck pain. No dizziness or vertigo. Denies nausea or vomiting. REVIEW OF SYSTEMS:  6 systems reviewed. Pertinent positives per HPI. Otherwise noted to be negative. Nursing notes reviewed and agree with above. Past medical/surgical history reviewed.     MEDICATIONS   Discharge Medication List as of 11/4/2021  3:54 PM      CONTINUE these medications which have NOT CHANGED    Details   Suvorexant (BELSOMRA) 20 MG TABS Historical Med      omeprazole (PRILOSEC) 40 MG delayed release capsule TAKE ONE CAPSULE BY MOUTH EVERY MORNING BEFORE BREAKFAST, Disp-30 capsule, R-5Normal      atomoxetine (STRATTERA) 40 MG capsule Take 1 capsule by mouth daily, Disp-30 capsule, R-5Normal      cloNIDine (CATAPRES) 0.3 MG tablet TAKE THREE TABLETS BY MOUTH EVERY EVENING, Disp-90 tablet, R-10Normal      meloxicam (MOBIC) 7.5 MG tablet TAKE ONE TABLET BY MOUTH DAILY, Disp-30 tablet, R-4Normal      butalbital-acetaminophen-caffeine (FIORICET, ESGIC) -40 MG per tablet Take 1 tablet by mouth every 6 hours as needed for Headaches, Disp-15 tablet, R-0Normal               ALLERGIES Allergies   Allergen Reactions    Compazine [Prochlorperazine Maleate] Other (See Comments)     Makes her \"crazy\"    Sulfa Antibiotics          BP (!) 142/87   Pulse 67   Temp 98.3 °F (36.8 °C) (Oral)   Resp 14   Ht 5' 8\" (1.727 m)   Wt 193 lb 2 oz (87.6 kg)   SpO2 99%   BMI 29.36 kg/m²   General:  No acute distress. Non toxic appearance  Head:   There is a sutured laceration in the left mid forehead area without infection that is healing well. Patient has superficial bruising of the left periorbital area without significant swelling. There is no bony tenderness. Minimal swelling noted on the left forehead region. No bony nasal or mandibular tenderness. No dental injury. Eyes:   Conjunctiva clear, TITO, EOM's intact. Sclera anicteric. No nystagmus. Anterior chamber is clear. No subconjunctival hemorrhage noted. ENT:   Mucous membranes moist  Neck:   Supple. No adenopathy or jugular venous distension. Nontender  Lungs/Chest:  No respiratory distress  CVS:   Regular rate and rhythm  Abdomen:  Deferred  Extremities:  Full range of motion  Skin:   No rashes or lesions to exposed skin  Back:   Deferred  Neuro:  Alert and OX3. Speech clear and appropriate. No upper/lower extremity weakness. Normal sensation in all extremities. No facial asymmetry or weakness. Gait normal.  Psych:   Affect normal. Mood normal        RADIOLOGY:      LAB      ED COURSE / MDM:  44-year-old female with history of fall while sitting in a chair on uneven ground late at night on October 31 was seen by myself the following day for laceration of the forehead and contusion of her forehead. She was neurologically intact. She does not take any blood thinners. Laceration was repaired. Patient returns now stating that she has delayed bruising around her left eye. No visual complaints or evidence of eye injury. She has no headache or neck pain. Minimal bruising without swelling noted in the left periorbital area.   No bony tenderness. This is clearly bruising related to the forehead contusion that was delayed. I did not feel she needed brain or facial imaging. She was reassured. I recommended she use ice more frequently on the area and take Tylenol if needed for pain. Head injury instructions were again given. She was advised to return in 3 days as scheduled for suture removal.      I discussed with Bonnie Riveraut the results of the evaluation in the Emergency Department, diagnosis, care, prognosis and the importance of follow-up. The patient is stable for discharge. The patient and/or family are in agreement with the plan and all questions have been answered. Specific discharge instructions were explained, including reasons to return to the emergency department.       (Please note that portions of this note may have been completed with a voice recognition program.  Efforts were made to edit the dictation but occasionally words are mis-transcribed)        FINAL IMPRESSION:  1 --facial contusion  2 --history of closed head injury  3 --healing laceration of the forehead                Elliot Darby MD  11/06/21 1300

## 2021-11-07 ENCOUNTER — HOSPITAL ENCOUNTER (EMERGENCY)
Age: 46
Discharge: HOME | End: 2021-11-07
Attending: EMERGENCY MEDICINE
Payer: OTHER GOVERNMENT

## 2021-11-07 VITALS
TEMPERATURE: 97.7 F | HEIGHT: 68 IN | RESPIRATION RATE: 15 BRPM | WEIGHT: 192.7 LBS | SYSTOLIC BLOOD PRESSURE: 146 MMHG | HEART RATE: 86 BPM | OXYGEN SATURATION: 100 % | BODY MASS INDEX: 29.21 KG/M2 | DIASTOLIC BLOOD PRESSURE: 106 MMHG

## 2021-11-07 DIAGNOSIS — Z48.02 VISIT FOR SUTURE REMOVAL: Primary | ICD-10-CM

## 2021-11-07 PROCEDURE — 99283 EMERGENCY DEPT VISIT LOW MDM: CPT

## 2021-11-07 NOTE — ED PROVIDER NOTES
Emergency Department Provider Note     Location: [address] [place] [address]  11/7/2021    I independently performed a history and physical on Briana Wynn. All diagnostic, treatment, and disposition decisions were made by myself in conjunction with resident physician, Dr. Arun Munoz. I have discussed with the resident the management of this patient. Briefly, this is a 51 y.o. female here for suture removal. Patient had sutures placed on 11/1/21 after she fell. She is here for suture removal. She denies drainage or redness from the wound. ED Triage Vitals [11/07/21 1250]   BP Temp Temp Source Pulse Resp SpO2 Height Weight   (!) 143/113 97.7 °F (36.5 °C) Oral 102 15 100 % 5' 8\" (1.727 m) 192 lb 11.2 oz (87.4 kg)        Exam showed WDWN F, wound appears clean and dry. No erythema. No signs of infection. 4 stitches noted on the forehead. Suture removal done by resident physician Dr. Arun Munoz. I was present and supervised the entire procedure. For further details of Annette Xiong emergency department encounter, please see Dr. Mery Hall documentation. This chart was generated in part by using Dragon Dictation system and may contain errors related to that system including errors in grammar, punctuation, and spelling, as well as words and phrases that may be inappropriate. If there are any questions or concerns please feel free to contact the dictating provider for clarification.        Reatha Kanner, MD  11/07/21 2022

## 2021-11-07 NOTE — ED PROVIDER NOTES
Emergency Department Encounter  [address]     Patient: Anibal Hernandez  MRN: [id number]  : 1975  Date of Evaluation: 2021  ED Provider: Traci Mullins DO    Chief Complaint       Chief Complaint   Patient presents with    Suture / Staple Removal     Jamaal Hardin is a 51 y.o. female who presents to the emergency department for suture removal.       Pt received a left lacerations on 21 after a fall from her chair, which was sutured in the ED. They deny fevers or malaise. The wound appears improved to the patient. No redness or drainage from the wound. is decreasing. They have been able to manage cleaning/dressing changes without difficulty at home. ROS:     · History obtained from the patient  · CONSTITUTIONAL:  Neg Recent weight changes,fatigue,fever,chills or night sweats  · EYES:  Neg blurriness,tearing,itching or acute change in vision  · EARS:  Neg hearing loss,tinnitus,vertigo,discharge or earache. · NOSE:  Neg rhinorrhea,sneezing,itching,allergy or epistaxis  · MOUTH/THROAT:  Neg bleeding gums,hoarseness or sore throat. · RESPIRATORY:  Neg SOB,wheeze,cough,sputum,hemoptysis or brnochitis  · CARDIOVASCULAR:  Neg chest pain,palpitations,dyspnea on exertion,orthopnea,paroxysmal nocturnal dyspnea or edema  · GASTROINTESTINAL:  Neg Appetite changes,nausea,vomiting,or diarrhea,indigestion,dysphagia,change in bowel movements, or abdominal pain. · GENITOURINARY:  Neg Urinary frequency, hesitancy, urgency, polyuria, dysuria, hematuria, nocturia, or incontinence. · HEMATOLOGIC/LYMPHATIC:  Neg Anemia,bleeding tendency  · MUSCULOSKELETAL:  Neg New myalgias,bone pain,joint pain,swelling or stiffness and has had no change in gait. · NEUROLOGICAL: Neg Loss of consciousness,memory loss, forgetfulness, periods of confusion, difficulty concentrating, seizures, decline in intellect, nervousness, insomina, aphasia, or dysarthria.   · SKIN :  Neg skin or hair changes,and has no itching,rashes,sores. · PSYCHIATRIC:  Neg depression,personality changes,anxiety. · ENDOCRINE:  Neg polydipsia,polyuria,abnormal weight changes,heat /cold intolerance. · ALL/IMM :  Neg  reactions to drugs other than listed,food,insects,skin rash,trouble breathing,local or general lymph node enlargement or tenderness. Past History     Past Medical History:   Diagnosis Date    Depression     EDS (Tiffany-Danlos syndrome)     Osteoarthritis      Past Surgical History:   Procedure Laterality Date    ABDOMINOPLASTY  2013    GASTRECTOMY  2018    partial-band removed    GASTRIC BAND  2004    LUMBAR DISCECTOMY Bilateral     L 4/5- 2013- [address]    PATELLA FRACTURE SURGERY  2001    TIBIA FRACTURE SURGERY      tibial plateu fx    TUBAL LIGATION      WRIST SURGERY Left     1997     Social History     Socioeconomic History    Marital status:      Spouse name: None    Number of children: None    Years of education: None    Highest education level: None   Occupational History    None   Tobacco Use    Smoking status: Former Smoker     Packs/day: 0.20    Smokeless tobacco: Never Used    Tobacco comment: pt. quite a mth ago   Vaping Use    Vaping Use: Never used   Substance and Sexual Activity    Alcohol use: Yes     Comment: couple glasses wine nite    Drug use: No    Sexual activity: None   Other Topics Concern    None   Social History Narrative    None     Social Determinants of Health     Financial Resource Strain: Low Risk     Difficulty of Paying Living Expenses: Not hard at all   Food Insecurity: No Food Insecurity    Worried About Running Out of Food in the Last Year: Never true    Shobha of Food in the Last Year: Never true   Transportation Needs: No Transportation Needs    Lack of Transportation (Medical): No    Lack of Transportation (Non-Medical):  No   Physical Activity:     Days of Exercise per Week: Not on file    Minutes of Exercise per Session: Not on file   Stress:  Feeling of Stress : Not on file   Social Connections:     Frequency of Communication with Friends and Family: Not on file    Frequency of Social Gatherings with Friends and Family: Not on file    Attends Christianity Services: Not on file    Active Member of Clubs or Organizations: Not on file    Attends Club or Organization Meetings: Not on file    Marital Status: Not on file   Intimate Partner Violence:     Fear of Current or Ex-Partner: Not on file    Emotionally Abused: Not on file    Physically Abused: Not on file    Sexually Abused: Not on file   Housing Stability:     Unable to Pay for Housing in the Last Year: Not on file    Number of [place] in the Last Year: Not on file    Unstable Housing in the Last Year: Not on file       Medications/Allergies     Previous Medications    ATOMOXETINE (STRATTERA) 40 MG CAPSULE    Take 1 capsule by mouth daily    BUTALBITAL-ACETAMINOPHEN-CAFFEINE (FIORICET, ESGIC) -40 MG PER TABLET    Take 1 tablet by mouth every 6 hours as needed for Headaches    CLONIDINE (CATAPRES) 0.3 MG TABLET    TAKE THREE TABLETS BY MOUTH EVERY EVENING    MELOXICAM (MOBIC) 7.5 MG TABLET    TAKE ONE TABLET BY MOUTH DAILY    OMEPRAZOLE (PRILOSEC) 40 MG DELAYED RELEASE CAPSULE    TAKE ONE CAPSULE BY MOUTH EVERY MORNING BEFORE BREAKFAST    SUVOREXANT (BELSOMRA) 20 MG TABS         Allergies   Allergen Reactions    Compazine [Prochlorperazine Maleate] Other (See Comments)     Makes her \"crazy\"    Sulfa Antibiotics         Physical Exam       ED Triage Vitals [11/07/21 1250]   BP Temp Temp Source Pulse Resp SpO2 Height Weight   (!) 143/113 97.7 °F (36.5 °C) Oral 102 15 100 % 5' 8\" (1.727 m) 192 lb 11.2 oz (87.4 kg)     GENERAL APPEARANCE: Awake and alert. HEAD: Normocephalic. EYES: Sclera anicteric. ENT: Tolerates saliva. LUNGS: Respirations unlabored. EXTREMITIES: No acute deformities. SKIN: Warm and dry. Normal color, texture and turgor with no lesions or eruptions. Wound closed, no erythema or drainage  NEUROLOGICAL: No gross facial drooping. PSYCHIATRIC: Normal mood. Diagnostics   Labs:  No results found for this visit on 11/07/21. Radiographs:  No results found. ED Course and MDM   In brief, Chasity German is a 51 y.o. female who presented to the emergency department for suture removal for L scalp/ forehead laceration. .    I estimate there is LOW risk for (including but not limited to) ACUTE ARTERIAL INJURY or RETAINED FOREIGN BODY thus I consider the discharge disposition reasonable. Chasity German and I have discussed the diagnosis and risks, and we agree with discharging home with close follow-up. We also discussed returning to the Emergency Department immediately if new or worsening symptoms occur. We have discussed the symptoms which are most concerning that necessitate immediate return. ED Medication Orders (From admission, onward)    None        Suture Removal  Performed by: Jean Inman DO  Authorized by: Ferris Severin, MD  Consent: Verbal consent obtained. Risks and benefits: risks, benefits and alternatives were discussed  Consent given by: patient  Patient understanding: patient states understanding of the procedure being performed  Patient consent: the patient's understanding of the procedure matches consent given  Patient identity confirmed: verbally with patient  Body area: Left Forehead  Wound Appearance: clean  Patient tolerance: Patient tolerated the procedure well with no immediate complications. Final Impression      1. Visit for suture removal        DISPOSITION         Follow-up   No follow-up provider specified.   Discharge Medication List as of 11/7/2021  1:26 PM        Jean Inman DO  Family Medicine PGY-2     Jean Inman DO  Resident  11/07/21 2145

## 2021-11-15 ENCOUNTER — TELEPHONE (OUTPATIENT)
Dept: FAMILY MEDICINE CLINIC | Age: 46
End: 2021-11-15

## 2021-11-15 NOTE — TELEPHONE ENCOUNTER
----- Message from Ruddy Tong sent at 11/15/2021 12:34 PM EST -----  Subject: Message to Provider    QUESTIONS  Information for Provider? Patient would like to come to discuss a change   in her medication. Patient would also like to discuss getting more a   formal diagnosis for ADHD and possibly autism. ---------------------------------------------------------------------------  --------------  Farrah GONSALES  What is the best way for the office to contact you? OK to leave message on   voicemail  Preferred Call Back Phone Number? [phone]  ---------------------------------------------------------------------------  --------------  SCRIPT ANSWERS  Relationship to Patient? Self  (Is the patient requesting to be seen urgently for their symptoms?)? No  Is this follow up request related to routine Diabetes Management? No  Are you having any new concerns about your existing condition?  Yes

## 2021-11-22 ENCOUNTER — OFFICE VISIT (OUTPATIENT)
Dept: FAMILY MEDICINE CLINIC | Age: 46
End: 2021-11-22
Payer: OTHER GOVERNMENT

## 2021-11-22 VITALS
BODY MASS INDEX: 30.26 KG/M2 | SYSTOLIC BLOOD PRESSURE: 112 MMHG | DIASTOLIC BLOOD PRESSURE: 80 MMHG | WEIGHT: 199 LBS | OXYGEN SATURATION: 98 % | HEART RATE: 71 BPM | RESPIRATION RATE: 12 BRPM | TEMPERATURE: 97.1 F

## 2021-11-22 DIAGNOSIS — Z23 FLU VACCINE NEED: ICD-10-CM

## 2021-11-22 DIAGNOSIS — M51.9 LUMBAR DISC DISEASE: ICD-10-CM

## 2021-11-22 DIAGNOSIS — M25.50 HYPERMOBILITY ARTHRALGIA: ICD-10-CM

## 2021-11-22 DIAGNOSIS — F90.9 ADULT ADHD: ICD-10-CM

## 2021-11-22 DIAGNOSIS — M25.50 POLYARTHRALGIA: ICD-10-CM

## 2021-11-22 DIAGNOSIS — F51.04 PSYCHOPHYSIOLOGICAL INSOMNIA: Primary | ICD-10-CM

## 2021-11-22 PROCEDURE — 90471 IMMUNIZATION ADMIN: CPT | Performed by: FAMILY MEDICINE

## 2021-11-22 PROCEDURE — 90674 CCIIV4 VAC NO PRSV 0.5 ML IM: CPT | Performed by: FAMILY MEDICINE

## 2021-11-22 PROCEDURE — 99213 OFFICE O/P EST LOW 20 MIN: CPT | Performed by: FAMILY MEDICINE

## 2021-11-22 RX ORDER — DEXTROAMPHETAMINE SACCHARATE, AMPHETAMINE ASPARTATE, DEXTROAMPHETAMINE SULFATE AND AMPHETAMINE SULFATE 3.75; 3.75; 3.75; 3.75 MG/1; MG/1; MG/1; MG/1
15 TABLET ORAL DAILY
Qty: 10 TABLET | Refills: 0 | Status: SHIPPED | OUTPATIENT
Start: 2021-11-22 | End: 2021-12-02 | Stop reason: ALTCHOICE

## 2021-11-22 RX ORDER — SUVOREXANT 20 MG/1
1 TABLET, FILM COATED ORAL NIGHTLY PRN
Qty: 30 TABLET | Refills: 5 | Status: SHIPPED | OUTPATIENT
Start: 2021-11-22 | End: 2021-12-22

## 2021-11-22 RX ORDER — MELOXICAM 7.5 MG/1
TABLET ORAL
Qty: 30 TABLET | Refills: 5 | Status: SHIPPED | OUTPATIENT
Start: 2021-11-22 | End: 2022-09-28

## 2021-11-22 NOTE — PROGRESS NOTES
[facility]  Progress Note  Tessy DO Denise Mijares  [birth year]    11/22/21    Chief Complaint:   Denise Alejandro is a 51 y.o. female who is here for emotional health. HPI:   ADHD dx. Diagnosed while living in [facility]. Sister with ADHD. Father likely autistic. Her children have ADHD. Meloxicam is helpful. No NSAID induced gastropathy symptoms. Nausea -- > dry heaving and other side effects on Strattera including hot flashes. ROS negative for headache, visionchanges, chest pain, shortness of breath, abdominal pain, urinary sx, bowel changes. Past medical, surgical, and social history reviewed. and allergies reviewed. Allergies   Allergen Reactions    Compazine [Prochlorperazine Maleate] Other (See Comments)     Makes her \"crazy\"    Strattera [Atomoxetine]      Side effects: headaches, dizziness, dry heaving, hot flashes.  Sulfa Antibiotics      Prior to Visit Medications    Medication Sig Taking? Authorizing Provider   meloxicam (MOBIC) 7.5 MG tablet TAKE ONE TABLET BY MOUTH DAILY Yes Theodora Pillion Bingcang, DO   Suvorexant (BELSOMRA) 20 MG TABS Take 1 tablet by mouth nightly as needed (insomnia) for up to 30 days.  Yes Theodora Pillion Bingcang, DO   omeprazole (PRILOSEC) 40 MG delayed release capsule TAKE ONE CAPSULE BY MOUTH EVERY MORNING BEFORE BREAKFAST Yes Theodora Pillion Bingcang, DO   cloNIDine (CATAPRES) 0.3 MG tablet TAKE THREE TABLETS BY MOUTH EVERY EVENING Yes Theodora Pillion Bingcang, DO          Vitals:    11/22/21 1158   BP: 112/80   Pulse: 71   Resp: 12   Temp: 97.1 °F (36.2 °C)   TempSrc: Temporal   SpO2: 98%   Weight: 199 lb (90.3 kg)      Wt Readings from Last 3 Encounters:   11/22/21 199 lb (90.3 kg)   11/07/21 192 lb 11.2 oz (87.4 kg)   11/04/21 193 lb 2 oz (87.6 kg)     BP Readings from Last 3 Encounters:   11/22/21 112/80   11/07/21 (!) 146/106   11/04/21 (!) 142/87       Patient Active Problem List   Diagnosis    Psychophysiological insomnia    Adjustment disorder with anxiety    Former smoker       Immunization History   Administered Date(s) Administered    COVID-19, Osman Srivastava, PF, 30mcg/0.3mL 03/12/2021, 04/02/2021    Influenza, Quadv, IM, PF (6 mo and older Fluzone, Flulaval, Fluarix, and 3 yrs and older Afluria) 10/15/2018, 10/14/2019    MMR 10/14/2019    Tdap (Boostrix, Adacel) 10/14/2019, 09/14/2021       Past Medical History:   Diagnosis Date    Depression     EDS (Tiffany-Danlos syndrome)     Osteoarthritis      Past Surgical History:   Procedure Laterality Date    ABDOMINOPLASTY  2013    GASTRECTOMY  2018    partial-band removed    GASTRIC BAND  2004    LUMBAR DISCECTOMY Bilateral     L 4/5- 2013- [address]  2001    TIBIA FRACTURE SURGERY      tibial plateu fx    TUBAL LIGATION      WRIST SURGERY Left     1997     Family History   Problem Relation Age of Onset    Arthritis Mother      Social History     Socioeconomic History    Marital status:      Spouse name: Not on file    Number of children: Not on file    Years of education: Not on file    Highest education level: Not on file   Occupational History    Not on file   Tobacco Use    Smoking status: Former Smoker     Packs/day: 0.20    Smokeless tobacco: Never Used    Tobacco comment: pt. quite a mth ago   Vaping Use    Vaping Use: Never used   Substance and Sexual Activity    Alcohol use: Yes     Comment: couple glasses wine nite    Drug use: No    Sexual activity: Not on file   Other Topics Concern    Not on file   Social History Narrative    Not on file     Social Determinants of Health     Financial Resource Strain: Low Risk     Difficulty of Paying Living Expenses: Not hard at all   Food Insecurity: No Food Insecurity    Worried About [address] [facility] in the Last Year: Never true    Shobha of Food in the Last Year: Never true   Transportation Needs: No Transportation Needs    Lack of Transportation (Medical):  No    Lack of Transportation (Non-Medical): No   Physical Activity:     Days of Exercise per Week: Not on file    Minutes of Exercise per Session: Not on file   Stress:     Feeling of Stress : Not on file   Social Connections:     Frequency of Communication with Friends and Family: Not on file    Frequency of Social Gatherings with Friends and Family: Not on file    Attends Spiritism Services: Not on file    Active Member of [address] [place] [address] or Organizations: Not on file    Attends Club or Organization Meetings: Not on file    Marital Status: Not on file   Intimate Partner Violence:     Fear of Current or Ex-Partner: Not on file    Emotionally Abused: Not on file    Physically Abused: Not on file    Sexually Abused: Not on file   Housing Stability:     Unable to Pay for Housing in the Last Year: Not on file    Number of [place] in the Last Year: Not on file    Unstable Housing in the Last Year: Not on file       O: /80   Pulse 71   Temp 97.1 °F (36.2 °C) (Temporal)   Resp 12   Wt 199 lb (90.3 kg)   SpO2 98%   BMI 30.26 kg/m²   Physical Exam  GEN: No acute distress,cooperative, well nourished, alert. HEENT: PEERLA, EOMI , normocephalic/atraumatic, external nose appears normal.  External ear is normal.    Neck: soft, supple, no appreciable thyromegaly,mass  CV: No upper extremity edema. Resp:  Breathing comfortably. Psych:normal affect. Neuro: AOx3  Other Pertinent Physical Exam findings: n/a        ASSESSMENT   Diagnosis Orders   1. Psychophysiological insomnia  Suvorexant (BELSOMRA) 20 MG TABS   2. Lumbar disc disease  meloxicam (MOBIC) 7.5 MG tablet   3. Polyarthralgia  meloxicam (MOBIC) 7.5 MG tablet   4. Hypermobility arthralgia  meloxicam (MOBIC) 7.5 MG tablet   5. Adult ADHD       #1-4: The risks, benefits, potential side effects and barriers to medication use were addressed today. Understanding was acknowledged. Patient asked to follow-up if condition(s) do not improve as anticipated.   #5: patient wanted dx on the medical chart and potential need for acommodations. PLAN          Time spent on encounter (including any number of the following: review of labs, imaging, provider notes, outside hospital records; performing examination/evaluation; counseling patient and family; ordering medications/tests; placing referrals and communication with referring physicians; coordination of care, and documentation in the EHR):  24 minutes  Established E/M: 10-19 ([id number]), 20-29 (04189), 30-39 (17680), 40-54 (07838)   New E/M: 15-29 (95916), 30-44 (55020), 45-59 (96055), 60-74 (62895)  Telephone E/M: 5-10 ([place]), 11-20 (58981), 21-30 (87933)    If applicable, see additional patient information and instructions under \"Patient Instructions. \"    Return in about 3 months (around 2/22/2022). There are no Patient Instructions on file for this visit. Please note a portion of this chart was generated using dragon dictation software. Although every effort was made to ensure the accuracy of this automated transcription,some errors in transcription may have occurred.

## 2021-11-30 ENCOUNTER — PATIENT MESSAGE (OUTPATIENT)
Dept: FAMILY MEDICINE CLINIC | Age: 46
End: 2021-11-30

## 2021-11-30 DIAGNOSIS — F90.9 ADULT ADHD: Primary | ICD-10-CM

## 2021-12-01 NOTE — TELEPHONE ENCOUNTER
From: Akiko Lynne  To: Dr. Rosario Martin: 11/30/2021 7:06 PM EST  Subject: Adderall dosage    Hello   I'm a week into the 15mg dose now. I first tried taking half a pill in the morning and the other half 4-6 hrs later. Did not notice any effect. Tried taking the full 15mg in the morning. Notice some side effects, and mild overstimulation followed by afternoon crash. 10mg 2x/day seems to be a middle ground for dosing and sustaining. Can we try this?

## 2021-12-02 RX ORDER — DEXTROAMPHETAMINE SACCHARATE, AMPHETAMINE ASPARTATE, DEXTROAMPHETAMINE SULFATE AND AMPHETAMINE SULFATE 2.5; 2.5; 2.5; 2.5 MG/1; MG/1; MG/1; MG/1
10 TABLET ORAL 2 TIMES DAILY
Qty: 60 TABLET | Refills: 0 | Status: SHIPPED | OUTPATIENT
Start: 2021-12-02 | End: 2022-01-24 | Stop reason: SDUPTHER

## 2021-12-15 ENCOUNTER — PATIENT MESSAGE (OUTPATIENT)
Dept: FAMILY MEDICINE CLINIC | Age: 46
End: 2021-12-15

## 2021-12-21 ENCOUNTER — PATIENT MESSAGE (OUTPATIENT)
Dept: FAMILY MEDICINE CLINIC | Age: 46
End: 2021-12-21

## 2021-12-21 NOTE — TELEPHONE ENCOUNTER
From: Rufino Lazar  To: Dr. Ole Kehr  Sent: 12/21/2021 9:02 AM EST  Subject: Work Release     Thank you. Attached is the form I need.

## 2021-12-23 NOTE — TELEPHONE ENCOUNTER
The letter has been placed at [facility] for signature. mychart message has been sent.

## 2021-12-23 NOTE — TELEPHONE ENCOUNTER
Patient has completed Tdap and MMR. Please print form for my signature. Then ask how she would like to  the form.

## 2022-02-18 ENCOUNTER — OFFICE VISIT (OUTPATIENT)
Dept: FAMILY MEDICINE CLINIC | Age: 47
End: 2022-02-18
Payer: OTHER GOVERNMENT

## 2022-02-18 VITALS
RESPIRATION RATE: 12 BRPM | WEIGHT: 195.8 LBS | TEMPERATURE: 96.6 F | SYSTOLIC BLOOD PRESSURE: 124 MMHG | DIASTOLIC BLOOD PRESSURE: 92 MMHG | BODY MASS INDEX: 29.77 KG/M2 | HEART RATE: 148 BPM | OXYGEN SATURATION: 97 %

## 2022-02-18 DIAGNOSIS — R68.89 SUSPECTED AUTISM DISORDER: ICD-10-CM

## 2022-02-18 DIAGNOSIS — F32.A ANXIETY AND DEPRESSION: ICD-10-CM

## 2022-02-18 DIAGNOSIS — F90.9 ADULT ADHD: Primary | ICD-10-CM

## 2022-02-18 DIAGNOSIS — Q79.60 EDS (EHLERS-DANLOS SYNDROME): ICD-10-CM

## 2022-02-18 DIAGNOSIS — F41.9 ANXIETY AND DEPRESSION: ICD-10-CM

## 2022-02-18 DIAGNOSIS — Z81.8 FAMILY HISTORY OF AUTISM: ICD-10-CM

## 2022-02-18 PROCEDURE — 99215 OFFICE O/P EST HI 40 MIN: CPT | Performed by: FAMILY MEDICINE

## 2022-02-18 RX ORDER — DEXTROAMPHETAMINE SACCHARATE, AMPHETAMINE ASPARTATE, DEXTROAMPHETAMINE SULFATE AND AMPHETAMINE SULFATE 2.5; 2.5; 2.5; 2.5 MG/1; MG/1; MG/1; MG/1
TABLET ORAL
Qty: 30 TABLET | Refills: 0 | Status: SHIPPED | OUTPATIENT
Start: 2022-02-18 | End: 2022-03-02 | Stop reason: SDUPTHER

## 2022-02-18 RX ORDER — DIAZEPAM 5 MG/1
5 TABLET ORAL EVERY 6 HOURS PRN
Qty: 60 TABLET | Refills: 2 | Status: SHIPPED | OUTPATIENT
Start: 2022-02-18 | End: 2022-03-02 | Stop reason: SDUPTHER

## 2022-02-18 RX ORDER — DEXTROAMPHETAMINE SACCHARATE, AMPHETAMINE ASPARTATE, DEXTROAMPHETAMINE SULFATE AND AMPHETAMINE SULFATE 3.75; 3.75; 3.75; 3.75 MG/1; MG/1; MG/1; MG/1
15 TABLET ORAL EVERY MORNING
Qty: 30 TABLET | Refills: 0 | Status: SHIPPED | OUTPATIENT
Start: 2022-04-19 | End: 2022-09-20

## 2022-02-18 RX ORDER — DEXTROAMPHETAMINE SACCHARATE, AMPHETAMINE ASPARTATE, DEXTROAMPHETAMINE SULFATE AND AMPHETAMINE SULFATE 2.5; 2.5; 2.5; 2.5 MG/1; MG/1; MG/1; MG/1
TABLET ORAL
Qty: 30 TABLET | Refills: 0 | Status: SHIPPED | OUTPATIENT
Start: 2022-04-19 | End: 2022-09-20

## 2022-02-18 RX ORDER — DEXTROAMPHETAMINE SACCHARATE, AMPHETAMINE ASPARTATE, DEXTROAMPHETAMINE SULFATE AND AMPHETAMINE SULFATE 3.75; 3.75; 3.75; 3.75 MG/1; MG/1; MG/1; MG/1
15 TABLET ORAL EVERY MORNING
Qty: 30 TABLET | Refills: 0 | Status: SHIPPED | OUTPATIENT
Start: 2022-02-18 | End: 2022-03-02 | Stop reason: SDUPTHER

## 2022-02-18 RX ORDER — DEXTROAMPHETAMINE SACCHARATE, AMPHETAMINE ASPARTATE, DEXTROAMPHETAMINE SULFATE AND AMPHETAMINE SULFATE 3.75; 3.75; 3.75; 3.75 MG/1; MG/1; MG/1; MG/1
15 TABLET ORAL EVERY MORNING
Qty: 30 TABLET | Refills: 0 | Status: SHIPPED | OUTPATIENT
Start: 2022-03-20 | End: 2022-09-20

## 2022-02-18 RX ORDER — DEXTROAMPHETAMINE SACCHARATE, AMPHETAMINE ASPARTATE, DEXTROAMPHETAMINE SULFATE AND AMPHETAMINE SULFATE 2.5; 2.5; 2.5; 2.5 MG/1; MG/1; MG/1; MG/1
TABLET ORAL
Qty: 30 TABLET | Refills: 0 | Status: SHIPPED | OUTPATIENT
Start: 2022-03-20 | End: 2022-09-20

## 2022-02-18 NOTE — LETTER
February 18, 2022     Patient: Evelia Carty   YOB: 1975   Date of Visit: 2/18/2022       To Whom It May Concern:      Mrs. Sandra Lazcano has been my patient since 2016. I suspect that Lia Cali has autism. She has allowed me to disclose her diagnosis of ADHD. In context with her family members known to have autism, it is my medical opinion that Lia Cali has autism. With her neuro-atypical features and how this is affecting her academic career, I am attempting to coordinate her medical care to include an evaluation by a psychologist for a formal diagnosis of autism. If you have any questions or concerns, please don't hesitate to call.     Sincerely,        Mainor Bee, DO

## 2022-02-18 NOTE — PROGRESS NOTES
[facility]  Progress Note  Ketty ZhuDO Angela  1975 02/28/22    Chief Complaint:   Angela Robb is a 51 y.o. female who is here for ADHD and anxiety        HPI:   Doing well with ADD medication. Denies palpitations, headaches or insomnia. Denies increased anxiety while on medication. Does not endorse hallucinations, delusional thinking, aggression, hostility or any manic episodes. Stress with her daughter's (Frank's) mental health. Julien Webster herself has been pursuing her degree at the [employer] and there seems to be some conflict with one of her college professors. Patient would like documentation in the medical chart regarding her mental health issues. Patient believes she has autism. She has provided me illustrations such as knowing multiple details about the lyrics and details of the minor notes of many different rocking recordings. 2 of her children have autism. 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 ROS negative for headache, visionchanges, chest pain, shortness of breath, abdominal pain, urinary sx, bowel changes. Past medical, surgical, and social history reviewed. and allergies reviewed. Allergies   Allergen Reactions    Compazine [Prochlorperazine Maleate] Other (See Comments)     Makes her \"crazy\"    Strattera [Atomoxetine]      Side effects: headaches, dizziness, dry heaving, hot flashes.  Sulfa Antibiotics      Prior to Visit Medications    Medication Sig Taking? Authorizing Provider   amphetamine-dextroamphetamine (ADDERALL, 15MG,) 15 MG tablet Take 1 tablet by mouth every morning for 30 days. Yes Gómez Asp Bingcang, DO   amphetamine-dextroamphetamine (ADDERALL, 15MG,) 15 MG tablet Take 1 tablet by mouth every morning for 30 doses. Yes Gómez Asp Bingcang, DO   amphetamine-dextroamphetamine (ADDERALL, 15MG,) 15 MG tablet Take 1 tablet by mouth every morning for 30 days.  Yes Manolo Rivera, DO amphetamine-dextroamphetamine (ADDERALL, 10MG,) 10 MG tablet Take 1 tablet po mid-day. Yes hSae Mckeon, DO   amphetamine-dextroamphetamine (ADDERALL, 10MG,) 10 MG tablet Take 1 tablet po mid-day. Yes Shae Mckeon, DO   amphetamine-dextroamphetamine (ADDERALL, 10MG,) 10 MG tablet Take 1 tablet po mid-day. Yes Shae Mckeon DO   diazePAM (VALIUM) 5 MG tablet Take 1 tablet by mouth every 6 hours as needed for Anxiety for up to 30 days. Yes Shae Mckeon DO   amphetamine-dextroamphetamine (ADDERALL, 10MG,) 10 MG tablet Take 1 tablet by mouth 2 times daily for 60 doses.  Yes Shae Mckeon DO   meloxicam (MOBIC) 7.5 MG tablet TAKE ONE TABLET BY MOUTH DAILY Yes Shae Mckeon DO   omeprazole (PRILOSEC) 40 MG delayed release capsule TAKE ONE CAPSULE BY MOUTH EVERY MORNING BEFORE BREAKFAST Yes Shae Mckeon DO   cloNIDine (CATAPRES) 0.3 MG tablet TAKE THREE TABLETS BY MOUTH EVERY EVENING Yes Shae Mckeon DO          Vitals:    02/18/22 1148 02/18/22 1308   BP: (!) 152/102 (!) 124/92   Pulse: 148    Resp: 12    Temp: 96.6 °F (35.9 °C)    TempSrc: Temporal    SpO2: 97%    Weight: 195 lb 12.8 oz (88.8 kg)       Wt Readings from Last 3 Encounters:   02/18/22 195 lb 12.8 oz (88.8 kg)   11/22/21 199 lb (90.3 kg)   11/07/21 192 lb 11.2 oz (87.4 kg)     BP Readings from Last 3 Encounters:   02/18/22 (!) 124/92   11/22/21 112/80   11/07/21 (!) 146/106       Patient Active Problem List   Diagnosis    Psychophysiological insomnia    Adjustment disorder with anxiety    Former smoker    Family history of autism    Suspected autism disorder    EDS (Tiffany-Danlos syndrome)       Immunization History   Administered Date(s) Administered    COVID-19, Pfizer Purple top, DILUTE for use, 12+ yrs, 30mcg/0.3mL dose 03/12/2021, 04/02/2021    Influenza, MDCK Quadv, IM, PF (Flucelvax 2 yrs and older) 11/22/2021    Influenza, Quadv, IM, PF (6 mo and older Fluzone, Flulaval, Fluarix, and 3 yrs and older Afluria) 10/15/2018, 10/14/2019    MMR 10/14/2019    Tdap (Boostrix, Adacel) 10/14/2019, 09/14/2021       Past Medical History:   Diagnosis Date    Depression     EDS (Tiffany-Danlos syndrome)     Osteoarthritis      Past Surgical History:   Procedure Laterality Date    ABDOMINOPLASTY  2013    GASTRECTOMY  2018    partial-band removed    GASTRIC BAND  2004    LUMBAR DISCECTOMY Bilateral     L 4/5- 2013- [address]    PATELLA FRACTURE SURGERY  2001    TIBIA FRACTURE SURGERY      tibial plateu fx    TUBAL LIGATION      WRIST SURGERY Left     1997     Family History   Problem Relation Age of Onset    Arthritis Mother      Social History     Socioeconomic History    Marital status:      Spouse name: Not on file    Number of children: Not on file    Years of education: Not on file    Highest education level: Not on file   Occupational History    Not on file   Tobacco Use    Smoking status: Former Smoker     Packs/day: 0.20    Smokeless tobacco: Never Used    Tobacco comment: pt. quite a mth ago   Vaping Use    Vaping Use: Never used   Substance and Sexual Activity    Alcohol use: Yes     Comment: couple glasses wine nite    Drug use: No    Sexual activity: Not on file   Other Topics Concern    Not on file   Social History Narrative    Not on file     Social Determinants of Health     Financial Resource Strain: Low Risk     Difficulty of Paying Living Expenses: Not hard at all   Food Insecurity: No Food Insecurity    Worried About [address] in the Last Year: Never true    [address]  in the Last Year: Never true   Transportation Needs: No Transportation Needs    Lack of Transportation (Medical): No    Lack of Transportation (Non-Medical):  No   Physical Activity:     Days of Exercise per Week: Not on file    Minutes of Exercise per Session: Not on file   Stress:     Feeling of Stress : Not on file   Social Connections:     Frequency of Communication with Friends and Family: Not on file    Frequency of Social Gatherings with Friends and Family: Not on file    Attends Baptism Services: Not on file    Active Member of Clubs or Organizations: Not on file    Attends Club or Organization Meetings: Not on file    Marital Status: Not on file   Intimate Partner Violence:     Fear of Current or Ex-Partner: Not on file    Emotionally Abused: Not on file    Physically Abused: Not on file    Sexually Abused: Not on file   Housing Stability:     Unable to Pay for Housing in the Last Year: Not on file    Number of [place] in the Last Year: Not on file    Unstable Housing in the Last Year: Not on file       O: BP (!) 124/92   Pulse 148   Temp 96.6 °F (35.9 °C) (Temporal)   Resp 12   Wt 195 lb 12.8 oz (88.8 kg)   SpO2 97%   BMI 29.77 kg/m²   Physical Exam  GEN: No acute distress,cooperative, well nourished, alert. HEENT: PEERLA, EOMI , normocephalic/atraumatic, external nose appears normal.  External ear is normal.    Neck: soft, supple, no appreciable thyromegaly,mass  CV: No upper extremity edema. Resp:  Breathing comfortably. Psych:normal affect. Neuro: AOx3  Other Pertinent Physical Exam findings: She is able demonstrate her thumb making contact with the radial side of her wrist.      ASSESSMENT   Diagnosis Orders   1. Adult ADHD  amphetamine-dextroamphetamine (ADDERALL, 15MG,) 15 MG tablet    amphetamine-dextroamphetamine (ADDERALL, 15MG,) 15 MG tablet    amphetamine-dextroamphetamine (ADDERALL, 15MG,) 15 MG tablet    amphetamine-dextroamphetamine (ADDERALL, 10MG,) 10 MG tablet    amphetamine-dextroamphetamine (ADDERALL, 10MG,) 10 MG tablet    amphetamine-dextroamphetamine (ADDERALL, 10MG,) 10 MG tablet   2. Anxiety and depression  diazePAM (VALIUM) 5 MG tablet   3. EDS (Tiffany-Danlos syndrome)     4. Suspected autism disorder     5.  Family history of autism       My patient has requested a controlled substances at the appropriate intervals and has not lost medications. #1: The current medical regimen is effective;  continue present plan and medications. Pt aware of need for every 3 month medication followup appointments, and that medication refills for benzodiazepines, narcotics and/or stimulants will only be given at appointment. The risks, benefits, potential side effects and barriers to medication use were addressed today. Understanding was acknowledged. Patient asked to follow-up if condition(s) do not improve as anticipated. #2: The current medical regimen is effective;  continue present plan and medications. Pt aware of need for every 3 month medication followup appointments, and that medication refills for benzodiazepines, narcotics and/or stimulants will only be given at appointment. The risks, benefits, potential side effects and barriers to medication use were addressed today. Understanding was acknowledged. Patient asked to follow-up if condition(s) do not improve as anticipated. #3: Documented today. #4 #5  I also created a letter regarding my suspicion of autism and request for accommodations. .  We will recheck to my colleague to find out if there is a psychologist to we could refer her to for a formal diagnosis. PLAN          Time spent on encounter (including any number of the following: review of labs, imaging, provider notes, outside hospital records; performing examination/evaluation; counseling patient and family; ordering medications/tests; placing referrals and communication with referring physicians; coordination of care, and documentation in the EHR): 44 minutes  Established E/M: 10-19 (41876), 20-29 (76391), 30-39 (13682), 40-54 (57367)   New E/M: 15-29 (65973), 30-44 (06856), 45-59 (73026), 60-74 (34536)  Telephone E/M: 5-10 ([facility]), 11-20 (25615), 21-30 (87372)    If applicable, see additional patient information and instructions under \"Patient Instructions. \"    Return in about 3 months (around 5/18/2022) for Anxiety, ADHD with replacement PCP. Patient Instructions   LET DR. STAFFORD KNOW IF THE LETTER NEEDS TO BE ADJUSTED OR IF ADDITIONAL DOCUMENTATION/FORMS ARE NEEDED ON YOUR BEHALF.    DR. Stefanie Montes HOPES TO GET YOU THE NAME(S) OF PSYCHOLOGIST(S) WHO COULD EVALUATE POSSIBLE AUTISM. CALL BACK OR SEND [employer] MESSAGE IN A FEW DAYS ON STATUS OF THIS. Please note a portion of this chart was generated using dragon dictation software. Although every effort was made to ensure the accuracy of this automated transcription,some errors in transcription may have occurred.

## 2022-02-18 NOTE — PATIENT INSTRUCTIONS
LET DR. STAFFORD KNOW IF THE LETTER NEEDS TO BE ADJUSTED OR IF ADDITIONAL DOCUMENTATION/FORMS ARE NEEDED ON YOUR BEHALF.    DR. Ines Lea HOPES TO GET YOU THE NAME(S) OF PSYCHOLOGIST(S) WHO COULD EVALUATE POSSIBLE AUTISM. CALL BACK OR SEND [employer] MESSAGE IN A FEW DAYS ON STATUS OF THIS.

## 2022-02-21 ENCOUNTER — TELEPHONE (OUTPATIENT)
Dept: FAMILY MEDICINE CLINIC | Age: 47
End: 2022-02-21

## 2022-02-21 NOTE — TELEPHONE ENCOUNTER
I will ask clinic staff to share the following with patient:    I did hear back from our behavioral health consultant who has referred a number of her clients to the following below for further evaluation if interested to determine if they do have autism or not; and if so what subtype. .. \"I would recommend referring her to [address] [place] [address] in [place]. I have referred a couple patients there for assessments and have been pleased with the outcomes. [email or web address]\"

## 2022-02-28 PROBLEM — Z81.8 FAMILY HISTORY OF AUTISM: Status: ACTIVE | Noted: 2022-02-28

## 2022-02-28 PROBLEM — R68.89 SUSPECTED AUTISM DISORDER: Status: ACTIVE | Noted: 2022-02-28

## 2022-02-28 PROBLEM — Q79.60 EDS (EHLERS-DANLOS SYNDROME): Status: ACTIVE | Noted: 2022-02-28

## 2022-03-02 ENCOUNTER — OFFICE VISIT (OUTPATIENT)
Dept: FAMILY MEDICINE CLINIC | Age: 47
End: 2022-03-02
Payer: OTHER GOVERNMENT

## 2022-03-02 VITALS
HEART RATE: 76 BPM | TEMPERATURE: 95.6 F | SYSTOLIC BLOOD PRESSURE: 120 MMHG | BODY MASS INDEX: 29.77 KG/M2 | OXYGEN SATURATION: 97 % | WEIGHT: 195.8 LBS | DIASTOLIC BLOOD PRESSURE: 74 MMHG

## 2022-03-02 DIAGNOSIS — F41.9 ANXIETY AND DEPRESSION: ICD-10-CM

## 2022-03-02 DIAGNOSIS — F90.9 ADULT ADHD: ICD-10-CM

## 2022-03-02 DIAGNOSIS — F32.A ANXIETY AND DEPRESSION: ICD-10-CM

## 2022-03-02 PROCEDURE — 99213 OFFICE O/P EST LOW 20 MIN: CPT | Performed by: FAMILY MEDICINE

## 2022-03-02 RX ORDER — DEXTROAMPHETAMINE SACCHARATE, AMPHETAMINE ASPARTATE, DEXTROAMPHETAMINE SULFATE AND AMPHETAMINE SULFATE 2.5; 2.5; 2.5; 2.5 MG/1; MG/1; MG/1; MG/1
TABLET ORAL
Qty: 30 TABLET | Refills: 0 | Status: SHIPPED | OUTPATIENT
Start: 2022-04-19 | End: 2022-03-02 | Stop reason: SDUPTHER

## 2022-03-02 RX ORDER — DEXTROAMPHETAMINE SACCHARATE, AMPHETAMINE ASPARTATE, DEXTROAMPHETAMINE SULFATE AND AMPHETAMINE SULFATE 3.75; 3.75; 3.75; 3.75 MG/1; MG/1; MG/1; MG/1
15 TABLET ORAL EVERY MORNING
Qty: 30 TABLET | Refills: 0 | Status: SHIPPED | OUTPATIENT
Start: 2022-05-19 | End: 2022-09-20

## 2022-03-02 RX ORDER — DEXTROAMPHETAMINE SACCHARATE, AMPHETAMINE ASPARTATE, DEXTROAMPHETAMINE SULFATE AND AMPHETAMINE SULFATE 3.75; 3.75; 3.75; 3.75 MG/1; MG/1; MG/1; MG/1
15 TABLET ORAL EVERY MORNING
Qty: 30 TABLET | Refills: 0 | Status: SHIPPED | OUTPATIENT
Start: 2022-04-19 | End: 2022-03-02 | Stop reason: SDUPTHER

## 2022-03-02 RX ORDER — DEXTROAMPHETAMINE SACCHARATE, AMPHETAMINE ASPARTATE, DEXTROAMPHETAMINE SULFATE AND AMPHETAMINE SULFATE 2.5; 2.5; 2.5; 2.5 MG/1; MG/1; MG/1; MG/1
TABLET ORAL
Qty: 30 TABLET | Refills: 0 | Status: SHIPPED | OUTPATIENT
Start: 2022-05-19 | End: 2022-09-20

## 2022-03-02 RX ORDER — DIAZEPAM 5 MG/1
5 TABLET ORAL EVERY 6 HOURS PRN
Qty: 60 TABLET | Refills: 2 | Status: SHIPPED | OUTPATIENT
Start: 2022-03-02 | End: 2022-04-01

## 2022-03-02 NOTE — PATIENT INSTRUCTIONS
Your doctor will be part of [facility] until March 11. You are certainly welcome to continue your healthcare needs with Dr. Sosa Reyes until then. To find a different primary care provider consider calling the \"find a doctor line\" at [phone]    -or-    --go to [facility]. com  --enter your location as your home zip code  --click \"save my location\"  --click \"find a doctor\"  --click \"Primary Care\"  --check out the reviews on the physician, nurse practitioner, or physician assistant that you are interested in.  --make sure that the provider is taking new patients (there should be a message on the right of the screen \"New Patient availability within. Arley Magallon \"  --click \"book on-line. \"      --follow the prompts        Thank you.     Dr. Sosa Reyes

## 2022-03-02 NOTE — PROGRESS NOTES
[facility]  Progress Note  Anthony Bealsurendra, DO Rosemarie Xiong  [birth year]    03/15/22    Chief Complaint:   Rosemarie Xiong is a 51 y.o. female who is here for f/u on ADHD and anxiety. HPI:     PDMP: Diazepam 2/18. Adderall Filled 2/18. Doing well with ADD medication. Denies palpitations, headaches or insomnia. Denies increased anxiety while on medication. Does not endorse hallucinations, delusional thinking, aggression, hostility or any manic episodes. Diazepam continues to be helpful. ROS negative for headache, visionchanges, chest pain, shortness of breath, abdominal pain, urinary sx, bowel changes. Past medical, surgical, and social history reviewed. and allergies reviewed. Allergies   Allergen Reactions    Compazine [Prochlorperazine Maleate] Other (See Comments)     Makes her \"crazy\"    Strattera [Atomoxetine] Other (See Comments)     Side effects: headaches, dizziness, dry heaving, hot flashes.  Sulfa Antibiotics Rash    Fluoxetine      ALL SSRI'S-CAN NOT FUNCTION WHILE TAKING THEM     Prochlorperazine Nausea Only     Other reaction(s): Unknown  Also causes anxiety       Prior to Visit Medications    Medication Sig Taking? Authorizing Provider   diazePAM (VALIUM) 5 MG tablet Take 1 tablet by mouth every 6 hours as needed for Anxiety for up to 30 days. Yes Lucina Mckeon, DO   amphetamine-dextroamphetamine (ADDERALL, 15MG,) 15 MG tablet Take 1 tablet by mouth every morning for 30 days. Yes Lucina Mckeon, DO   amphetamine-dextroamphetamine (ADDERALL, 10MG,) 10 MG tablet Take 1 tablet po mid-day. Yes Lucina Mckeon, DO   amphetamine-dextroamphetamine (ADDERALL, 15MG,) 15 MG tablet Take 1 tablet by mouth every morning for 30 days. Lucina Mckeon, DO   amphetamine-dextroamphetamine (ADDERALL, 15MG,) 15 MG tablet Take 1 tablet by mouth every morning for 30 doses.   Lucina Mckeon, DO   amphetamine-dextroamphetamine (ADDERALL, 10MG,) 10 MG tablet Take 1 tablet po mid-day. Ashley Mckeon DO   amphetamine-dextroamphetamine (ADDERALL, 10MG,) 10 MG tablet Take 1 tablet po mid-day. Ashley Toni Mckeon, DO   amphetamine-dextroamphetamine (ADDERALL, 10MG,) 10 MG tablet Take 1 tablet by mouth 2 times daily for 60 doses.   Ashley Mckeon DO   meloxicam (MOBIC) 7.5 MG tablet TAKE ONE TABLET BY MOUTH DAILY  Ashley Mckeon DO   omeprazole (PRILOSEC) 40 MG delayed release capsule TAKE ONE CAPSULE BY MOUTH EVERY MORNING BEFORE BREAKFAST  Dl Lee Mckeon DO   cloNIDine (CATAPRES) 0.3 MG tablet TAKE THREE TABLETS BY MOUTH EVERY EVENING  Ashley Mckeon DO          Vitals:    03/02/22 1136   BP: 120/74   Pulse: 76   Temp: 95.6 °F (35.3 °C)   SpO2: 97%   Weight: 195 lb 12.8 oz (88.8 kg)      Wt Readings from Last 3 Encounters:   03/03/22 194 lb (88 kg)   03/02/22 195 lb 12.8 oz (88.8 kg)   02/18/22 195 lb 12.8 oz (88.8 kg)     BP Readings from Last 3 Encounters:   03/03/22 130/82   03/02/22 120/74   02/18/22 (!) 124/92       Patient Active Problem List   Diagnosis    Psychophysiological insomnia    Adjustment disorder with anxiety    Former smoker    Family history of autism    Suspected autism disorder    EDS (Tiffany-Danlos syndrome)       Immunization History   Administered Date(s) Administered    COVID-19, Pfizer Purple top, DILUTE for use, 12+ yrs, 30mcg/0.3mL dose 03/12/2021, 04/02/2021    Influenza, MDCK Quadv, IM, PF (Flucelvax 2 yrs and older) 11/22/2021    Influenza, Ruy Nim, IM, PF (6 mo and older Fluzone, Flulaval, Fluarix, and 3 yrs and older Afluria) 10/15/2018, 10/14/2019    MMR 10/14/2019    Tdap (Boostrix, Adacel) 10/14/2019, 09/14/2021       Past Medical History:   Diagnosis Date    Depression     EDS (Tiffany-Danlos syndrome)     Osteoarthritis      Past Surgical History:   Procedure Laterality Date    ABDOMINOPLASTY  2013    GASTRECTOMY  2018    partial-band removed    GASTRIC BAND  2004    LUMBAR DISCECT[place]Y [place]     [place] 4/5- 2013- [place]    PATELLA FRACTURE SURGERY  2001    TIBIA FRACTURE SURGERY      tibial plateu fx    TUBAL LIGATION      WRIST SURGERY Left     1997     Family History   Problem Relation Age of Onset    Arthritis Mother      Social History     Socioeconomic History    Marital status:      Spouse name: Not on file    Number of children: Not on file    Years of education: Not on file    Highest education level: Not on file   Occupational History    Not on file   Tobacco Use    Smoking status: Former Smoker     Packs/day: 0.20    Smokeless tobacco: Never Used    Tobacco comment: pt. quite a mth ago   Vaping Use    Vaping Use: Never used   Substance and Sexual Activity    Alcohol use: Yes     Comment: couple glasses wine nite    Drug use: No    Sexual activity: Not on file   Other Topics Concern    Not on file   Social History Narrative    Not on file     Social Determinants of Health     Financial Resource Strain: Low Risk     Difficulty of Paying Living Expenses: Not hard at all   Food Insecurity: No Food Insecurity    Worried About [address] in the Last Year: Never true    [address] in the Last Year: Never true   Transportation Needs: No Transportation Needs    Lack of Transportation (Medical): No    Lack of Transportation (Non-Medical):  No   Physical Activity:     Days of Exercise per Week: Not on file    Minutes of Exercise per Session: Not on file   Stress:     Feeling of Stress : Not on file   Social Connections:     Frequency of Communication with Friends and Family: Not on file    Frequency of Social Gatherings with Friends and Family: Not on file    Attends Zoroastrian Services: Not on file    Active Member of Clubs or Organizations: Not on file    Attends Club or Organization Meetings: Not on file    Marital Status: Not on file   Intimate Partner Violence:     Fear of Current or Ex-Partner: Not on file  Emotionally Abused: Not on file    Physically Abused: Not on file    Sexually Abused: Not on file   Housing Stability:     Unable to Pay for Housing in the Last Year: Not on file    Number of Places Lived in the Last Year: Not on file    Unstable Housing in the Last Year: Not on file       O: /74   Pulse 76   Temp 95.6 °F (35.3 °C)   Wt 195 lb 12.8 oz (88.8 kg)   LMP 02/07/2022   SpO2 97%   BMI 29.77 kg/m²   Physical Exam  GEN: No acute distress,cooperative, well nourished, alert. HEENT: PEERLA, EOMI , normocephalic/atraumatic, external nose appears normal.  External ear is normal.    Neck: soft, supple, no appreciable thyromegaly,mass  CV: No upper extremity edema. Resp:  Breathing comfortably. Psych:normal affect. Neuro: AOx3  Other Pertinent Physical Exam findings: n/a        ASSESSMENT   Diagnosis Orders   1. Adult ADHD  amphetamine-dextroamphetamine (ADDERALL, 15MG,) 15 MG tablet    amphetamine-dextroamphetamine (ADDERALL, 10MG,) 10 MG tablet    DISCONTINUED: amphetamine-dextroamphetamine (ADDERALL, 15MG,) 15 MG tablet    DISCONTINUED: amphetamine-dextroamphetamine (ADDERALL, 10MG,) 10 MG tablet   2. Anxiety and depression  diazePAM (VALIUM) 5 MG tablet     Ha not lost CSs. Has not asked for CSs prematurely. #1-2: The current medical regimen is effective;  continue present plan and medications. Pt aware of need for every 3 month medication followup appointments, and that medication refills for benzodiazepines, narcotics and/or stimulants will only be given at appointment. The risks, benefits, potential side effects and barriers to medication use were addressed today. Understanding was acknowledged. Patient asked to follow-up if condition(s) do not improve as anticipated.             PLAN          Time spent on encounter (including any number of the following: review of labs, imaging, provider notes, outside hospital records; performing examination/evaluation; counseling patient and family; ordering medications/tests; placing referrals and communication with referring physicians; coordination of care, and documentation in the EHR): 20 minutes  Established E/M: 10-19 (91506), 20-29 (74176), 30-39 (68504), 40-54 (36879)   New E/M: 15-29 (38796), 30-44 (19938), 45-59 (66459), 60-74 (49075)  Telephone E/M: 5-10 (11579), 11-20 (69443), 21-30 (80662)    If applicable, see additional patient information and instructions under \"Patient Instructions. \"    Return in about 3 months (around 6/2/2022) for ADHD and establish with a PCP. Patient Instructions       Your doctor will be part of [facility] until March 11. You are certainly welcome to continue your healthcare needs with Dr. Crispin Ragland until then. To find a different primary care provider consider calling the \"find a doctor line\" at [phone]    -or-    --go to [facility]. com  --enter your location as your home zip code  --click \"save my location\"  --click \"find a doctor\"  --click \"Primary Care\"  --check out the reviews on the physician, nurse practitioner, or physician assistant that you are interested in.  --make sure that the provider is taking new patients (there should be a message on the right of the screen \"New Patient availability within. [facility] Thhadley Patterson \"  --click \"book on-line. \"      --follow the prompts        Thank you. Dr. Crispin Ragland          Please note a portion of this chart was generated using dragon dictation software. Although every effort was made to ensure the accuracy of this automated transcription,some errors in transcription may have occurred.

## 2022-03-03 ENCOUNTER — OFFICE VISIT (OUTPATIENT)
Dept: GYNECOLOGY | Age: 47
End: 2022-03-03
Payer: OTHER GOVERNMENT

## 2022-03-03 VITALS
RESPIRATION RATE: 17 BRPM | TEMPERATURE: 98 F | SYSTOLIC BLOOD PRESSURE: 130 MMHG | BODY MASS INDEX: 29.4 KG/M2 | OXYGEN SATURATION: 93 % | DIASTOLIC BLOOD PRESSURE: 82 MMHG | WEIGHT: 194 LBS | HEART RATE: 73 BPM | HEIGHT: 68 IN

## 2022-03-03 DIAGNOSIS — R87.810 ATYPICAL SQUAMOUS CELL CHANGES OF UNDETERMINED SIGNIFICANCE (ASCUS) ON CERVICAL CYTOLOGY WITH POSITIVE HIGH RISK HUMAN PAPILLOMA VIRUS (HPV): Primary | ICD-10-CM

## 2022-03-03 DIAGNOSIS — R87.610 ATYPICAL SQUAMOUS CELL CHANGES OF UNDETERMINED SIGNIFICANCE (ASCUS) ON CERVICAL CYTOLOGY WITH POSITIVE HIGH RISK HUMAN PAPILLOMA VIRUS (HPV): Primary | ICD-10-CM

## 2022-03-03 PROCEDURE — 99213 OFFICE O/P EST LOW 20 MIN: CPT | Performed by: OBSTETRICS & GYNECOLOGY

## 2022-03-03 ASSESSMENT — ENCOUNTER SYMPTOMS
EYES NEGATIVE: 1
RESPIRATORY NEGATIVE: 1
GASTROINTESTINAL NEGATIVE: 1

## 2022-03-04 NOTE — PROGRESS NOTES
[address] [place] [address]  Dept: [phone]  Dept Fax: [phone]  Loc: [phone]     3/3/2022    Skye Gabriel (:  1975) is a 51 y.o. female, here for evaluation of the following medical concerns:    Patient is here for ascus pap and pos hpv. Review of Systems   Constitutional: Negative. HENT: Negative. Eyes: Negative. Respiratory: Negative. Cardiovascular: Negative. Gastrointestinal: Negative. Genitourinary: Negative. Musculoskeletal: Negative. Skin: Negative. Neurological: Negative. Psychiatric/Behavioral: Negative.       Date of Birth 1975  Past Medical History:   Diagnosis Date    Depression     EDS (Tiffany-Danlos syndrome)     Osteoarthritis      Past Surgical History:   Procedure Laterality Date    ABDOMINOPLASTY  2013    GASTRECTOMY  2018    partial-band removed    GASTRIC BAND  2004    LUMBAR DISCECTOMY Bilateral     L 2013- [facility]    PATELLA FRACTURE SURGERY      TIBIA FRACTURE SURGERY      tibial plateu fx    TUBAL LIGATION      WRIST SURGERY Left          OB History    Para Term  AB Living   3 3 3     3   SAB IAB Ectopic Molar Multiple Live Births                    # Outcome Date GA Lbr Shiva/2nd Weight Sex Delivery Anes PTL Lv   3 Term     M Vag-Spont      2 Term     M Vag-Spont      1 Term     F Vag-Spont        Social History     Socioeconomic History    Marital status:      Spouse name: Not on file    Number of children: Not on file    Years of education: Not on file    Highest education level: Not on file   Occupational History    Not on file   Tobacco Use    Smoking status: Former Smoker     Packs/day: 0.20    Smokeless tobacco: Never Used    Tobacco comment: pt. quite a mth ago   Vaping Use    Vaping Use: Never used   Substance and Sexual Activity    Alcohol use: Yes     Comment: couple glasses wine nite    Drug use: No    Sexual activity: Not on file   Other Topics Concern    Not on file   Social History Narrative    Not on file     Social Determinants of Health     Financial Resource Strain: Low Risk     Difficulty of Paying Living Expenses: Not hard at all   Food Insecurity: No Food Insecurity    Worried About Running Out of Food in the Last Year: Never true    Shobha of Food in the Last Year: Never true   Transportation Needs: No Transportation Needs    Lack of Transportation (Medical): No    Lack of Transportation (Non-Medical): No   Physical Activity:     Days of Exercise per Week: Not on file    Minutes of Exercise per Session: Not on file   Stress:     Feeling of Stress : Not on file   Social Connections:     Frequency of Communication with Friends and Family: Not on file    Frequency of Social Gatherings with Friends and Family: Not on file    Attends Sikhism Services: Not on file    Active Member of [address] [place] [address] or Organizations: Not on file    Attends Club or Organization Meetings: Not on file    Marital Status: Not on file   Intimate Partner Violence:     Fear of Current or Ex-Partner: Not on file    Emotionally Abused: Not on file    Physically Abused: Not on file    Sexually Abused: Not on file   Housing Stability:     Unable to Pay for Housing in the Last Year: Not on file    Number of [place] in the Last Year: Not on file    Unstable Housing in the Last Year: Not on file     Allergies   Allergen Reactions    Compazine [Prochlorperazine Maleate] Other (See Comments)     Makes her \"crazy\"    Strattera [Atomoxetine] Other (See Comments)     Side effects: headaches, dizziness, dry heaving, hot flashes.     Sulfa Antibiotics Rash    Fluoxetine      ALL SSRI'S-CAN NOT FUNCTION WHILE TAKING THEM     Prochlorperazine Nausea Only     Other reaction(s): Unknown  Also causes anxiety       Outpatient Medications Marked as Taking for the 3/3/22 encounter (Office Visit) with Nelly Denton MD   Medication Sig Dispense Refill    diazePAM (VALIUM) 5 MG tablet Take 1 tablet by mouth every 6 hours as needed for Anxiety for up to 30 days. 60 tablet 2    [START ON 5/19/2022] amphetamine-dextroamphetamine (ADDERALL, 15MG,) 15 MG tablet Take 1 tablet by mouth every morning for 30 days. 30 tablet 0    [START ON 5/19/2022] amphetamine-dextroamphetamine (ADDERALL, 10MG,) 10 MG tablet Take 1 tablet po mid-day. 30 tablet 0    [START ON 4/19/2022] amphetamine-dextroamphetamine (ADDERALL, 15MG,) 15 MG tablet Take 1 tablet by mouth every morning for 30 days. 30 tablet 0    [START ON 3/20/2022] amphetamine-dextroamphetamine (ADDERALL, 15MG,) 15 MG tablet Take 1 tablet by mouth every morning for 30 doses. 30 tablet 0    [START ON 4/19/2022] amphetamine-dextroamphetamine (ADDERALL, 10MG,) 10 MG tablet Take 1 tablet po mid-day. 30 tablet 0    [START ON 3/20/2022] amphetamine-dextroamphetamine (ADDERALL, 10MG,) 10 MG tablet Take 1 tablet po mid-day. 30 tablet 0    meloxicam (MOBIC) 7.5 MG tablet TAKE ONE TABLET BY MOUTH DAILY 30 tablet 5    omeprazole (PRILOSEC) 40 MG delayed release capsule TAKE ONE CAPSULE BY MOUTH EVERY MORNING BEFORE BREAKFAST 30 capsule 5    cloNIDine (CATAPRES) 0.3 MG tablet TAKE THREE TABLETS BY MOUTH EVERY EVENING 90 tablet 10     Family History   Problem Relation Age of Onset    Arthritis Mother      /82   Pulse 73   Temp 98 °F (36.7 °C)   Resp 17   Ht 5' 8\" (1.727 m)   Wt 194 lb (88 kg)   LMP 02/07/2022   SpO2 93%   Breastfeeding No   BMI 29.50 kg/m²       Prior to Visit Medications    Medication Sig Taking? Authorizing Provider   diazePAM (VALIUM) 5 MG tablet Take 1 tablet by mouth every 6 hours as needed for Anxiety for up to 30 days. Yes Bi Mckeon, DO   amphetamine-dextroamphetamine (ADDERALL, 15MG,) 15 MG tablet Take 1 tablet by mouth every morning for 30 days.  Yes Bi Mckeon, DO   amphetamine-dextroamphetamine (ADDERALL, 10MG,) 10 MG tablet Take 1 tablet po mid-day. Yes Afshin Truongjonas Mckeon, DO   amphetamine-dextroamphetamine (ADDERALL, 15MG,) 15 MG tablet Take 1 tablet by mouth every morning for 30 days. Yes Afshin Amaralchenjonas [facility]   amphetamine-dextroamphetamine (ADDERALL, 15MG,) 15 MG tablet Take 1 tablet by mouth every morning for 30 doses. Yes Afshin Amaralchenjonas [facility], DO   amphetamine-dextroamphetamine (ADDERALL, 10MG,) 10 MG tablet Take 1 tablet po mid-day. Yes Afshin Amaralchens Linda, DO   amphetamine-dextroamphetamine (ADDERALL, 10MG,) 10 MG tablet Take 1 tablet po mid-day. Yes Afshin Amaralchens [facility], DO   meloxicam (MOBIC) 7.5 MG tablet TAKE ONE TABLET BY MOUTH DAILY Yes [facility], DO   omeprazole (PRILOSEC) 40 MG delayed release capsule TAKE ONE CAPSULE BY MOUTH EVERY MORNING BEFORE BREAKFAST Yes [facility], DO   cloNIDine (CATAPRES) 0.3 MG tablet TAKE THREE TABLETS BY MOUTH EVERY EVENING Yes [facility], DO   amphetamine-dextroamphetamine (ADDERALL, 10MG,) 10 MG tablet Take 1 tablet by mouth 2 times daily for 60 doses. Afshin Mckeon, DO        Allergies   Allergen Reactions    Compazine [Prochlorperazine Maleate] Other (See Comments)     Makes her \"crazy\"    Strattera [Atomoxetine] Other (See Comments)     Side effects: headaches, dizziness, dry heaving, hot flashes.     Sulfa Antibiotics Rash    Fluoxetine      ALL SSRI'S-CAN NOT FUNCTION WHILE TAKING THEM     Prochlorperazine Nausea Only     Other reaction(s): Unknown  Also causes anxiety         Past Medical History:   Diagnosis Date    Depression     EDS (Tiffany-Danlos syndrome)     Osteoarthritis        Past Surgical History:   Procedure Laterality Date    ABDOMINOPLASTY  2013    GASTRECTOMY  2018    partial-band removed    GASTRIC BAND  2004    LUMBAR DISCECTOMY Bilateral     L 4/5- 2013- [address]    PATELLA FRACTURE SURGERY  2001    TIBIA FRACTURE SURGERY      tibial plateu fx    TUBAL LIGATION      WRIST SURGERY Left     1997       Social History     Socioeconomic History    Marital status:      Spouse name: Not on file    Number of children: Not on file    Years of education: Not on file    Highest education level: Not on file   Occupational History    Not on file   Tobacco Use    Smoking status: Former Smoker     Packs/day: 0.20    Smokeless tobacco: Never Used    Tobacco comment: pt. quite a mth ago   Vaping Use    Vaping Use: Never used   Substance and Sexual Activity    Alcohol use: Yes     Comment: couple glasses wine nite    Drug use: No    Sexual activity: Not on file   Other Topics Concern    Not on file   Social History Narrative    Not on file     Social Determinants of Health     Financial Resource Strain: Low Risk     Difficulty of Paying Living Expenses: Not hard at all   Food Insecurity: No Food Insecurity    Worried About [address] [employer] in the Last Year: Never true    [address] [facility] in the Last Year: Never true   Transportation Needs: No Transportation Needs    Lack of Transportation (Medical): No    Lack of Transportation (Non-Medical):  No   Physical Activity:     Days of Exercise per Week: Not on file    Minutes of Exercise per Session: Not on file   Stress:     Feeling of Stress : Not on file   Social Connections:     Frequency of Communication with Friends and Family: Not on file    Frequency of Social Gatherings with Friends and Family: Not on file    Attends Faith Services: Not on file    Active Member of Clubs or Organizations: Not on file    Attends Club or Organization Meetings: Not on file    Marital Status: Not on file   Intimate Partner Violence:     Fear of Current or Ex-Partner: Not on file    Emotionally Abused: Not on file    Physically Abused: Not on file    Sexually Abused: Not on file   Housing Stability:     Unable to Pay for Housing in the Last Year: Not on file    Number of [place] in the Last Year: Not on file    Unstable Housing in the Last Year: Not on file        Family History   Problem Relation Age of Onset    Arthritis Mother        Vitals:    03/03/22 1124   BP: 130/82   Pulse: 73   Resp: 17   Temp: 98 °F (36.7 °C)   SpO2: 93%   Weight: 194 lb (88 kg)   Height: 5' 8\" (1.727 m)     Estimated body mass index is 29.5 kg/m² as calculated from the following:    Height as of this encounter: 5' 8\" (1.727 m). Weight as of this encounter: 194 lb (88 kg). Physical Exam  Constitutional:       General: She is not in acute distress. Appearance: She is well-developed. She is not diaphoretic. HENT:      Head: Normocephalic. Eyes:      Pupils: Pupils are equal, round, and reactive to light. Abdominal:      General: There is no distension. Palpations: Abdomen is soft. There is no mass. Tenderness: There is no abdominal tenderness. There is no guarding or rebound. Genitourinary:     Labia:         Right: No rash, tenderness, lesion or injury. Left: No rash, tenderness, lesion or injury. Vagina: No signs of injury and foreign body. No vaginal discharge, erythema, tenderness or bleeding. Cervix: No cervical motion tenderness, discharge or friability. Uterus: Not deviated, not enlarged, not fixed and not tender. Adnexa:         Right: No mass, tenderness or fullness. Left: No mass, tenderness or fullness. Rectum: No external hemorrhoid. Comments: Normal urethral meatus, nl urethra, nl bladder. Musculoskeletal:         General: No tenderness. Normal range of motion. Skin:     General: Skin is warm and dry. Coloration: Skin is not pale. Findings: No erythema or rash. Neurological:      Mental Status: She is alert and oriented to person, place, and time. Psychiatric:         Behavior: Behavior normal.         Thought Content: Thought content normal.         Judgment: Judgment normal.         ASSESSMENT/PLAN:  1.  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

## 2022-08-05 ENCOUNTER — OFFICE VISIT (OUTPATIENT)
Dept: FAMILY MEDICINE CLINIC | Age: 47
End: 2022-08-05
Payer: OTHER GOVERNMENT

## 2022-08-05 VITALS
WEIGHT: 199.4 LBS | OXYGEN SATURATION: 99 % | DIASTOLIC BLOOD PRESSURE: 84 MMHG | HEIGHT: 68 IN | SYSTOLIC BLOOD PRESSURE: 120 MMHG | HEART RATE: 95 BPM | TEMPERATURE: 97.5 F | BODY MASS INDEX: 30.22 KG/M2

## 2022-08-05 DIAGNOSIS — F43.22 ADJUSTMENT DISORDER WITH ANXIETY: ICD-10-CM

## 2022-08-05 DIAGNOSIS — F98.8 ATTENTION DEFICIT DISORDER (ADD) IN ADULT: Primary | ICD-10-CM

## 2022-08-05 DIAGNOSIS — F41.8 DEPRESSION WITH ANXIETY: ICD-10-CM

## 2022-08-05 DIAGNOSIS — F31.9 BIPOLAR I DISORDER WITH DEPRESSION (HCC): ICD-10-CM

## 2022-08-05 PROBLEM — F51.9 NONORGANIC SLEEP DISORDER: Status: ACTIVE | Noted: 2022-08-05

## 2022-08-05 PROBLEM — K21.00 GASTROESOPHAGEAL REFLUX DISEASE WITH ESOPHAGITIS: Status: ACTIVE | Noted: 2022-08-05

## 2022-08-05 PROBLEM — F60.3 BORDERLINE PERSONALITY DISORDER (HCC): Status: ACTIVE | Noted: 2022-08-05

## 2022-08-05 PROBLEM — K31.1 GASTRIC OUTLET OBSTRUCTION: Status: ACTIVE | Noted: 2018-01-05

## 2022-08-05 PROBLEM — E66.9 OBESITY: Status: ACTIVE | Noted: 2022-08-05
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
## 2022-08-05 PROBLEM — M54.50 LUMBAGO: Status: ACTIVE | Noted: 2022-08-05

## 2022-08-05 PROBLEM — M62.81 GENERALIZED MUSCLE WEAKNESS: Status: ACTIVE | Noted: 2022-08-05

## 2022-08-05 PROBLEM — T78.40XA ALLERGIES: Status: ACTIVE | Noted: 2022-08-05

## 2022-08-05 PROCEDURE — 99213 OFFICE O/P EST LOW 20 MIN: CPT | Performed by: FAMILY MEDICINE

## 2022-08-05 RX ORDER — CLONIDINE HYDROCHLORIDE 0.3 MG/1
TABLET ORAL
Qty: 90 TABLET | Refills: 2 | Status: SHIPPED | OUTPATIENT
Start: 2022-08-05 | End: 2022-10-31

## 2022-08-05 RX ORDER — DIAZEPAM 5 MG/1
TABLET ORAL
COMMUNITY
Start: 2021-09-01 | End: 2022-09-20

## 2022-08-05 SDOH — ECONOMIC STABILITY: FOOD INSECURITY: WITHIN THE PAST 12 MONTHS, THE FOOD YOU BOUGHT JUST DIDN'T LAST AND YOU DIDN'T HAVE MONEY TO GET MORE.: NEVER TRUE

## 2022-08-05 SDOH — ECONOMIC STABILITY: FOOD INSECURITY: WITHIN THE PAST 12 MONTHS, YOU WORRIED THAT YOUR FOOD WOULD RUN OUT BEFORE YOU GOT MONEY TO BUY MORE.: NEVER TRUE

## 2022-08-05 ASSESSMENT — PATIENT HEALTH QUESTIONNAIRE - PHQ9
10. IF YOU CHECKED OFF ANY PROBLEMS, HOW DIFFICULT HAVE THESE PROBLEMS MADE IT FOR YOU TO DO YOUR WORK, TAKE CARE OF THINGS AT HOME, OR GET ALONG WITH OTHER PEOPLE: 0
SUM OF ALL RESPONSES TO PHQ QUESTIONS 1-9: 12
5. POOR APPETITE OR OVEREATING: 0
8. MOVING OR SPEAKING SO SLOWLY THAT OTHER PEOPLE COULD HAVE NOTICED. OR THE OPPOSITE, BEING SO FIGETY OR RESTLESS THAT YOU HAVE BEEN MOVING AROUND A LOT MORE THAN USUAL: 3
SUM OF ALL RESPONSES TO PHQ QUESTIONS 1-9: 12
1. LITTLE INTEREST OR PLEASURE IN DOING THINGS: 0
4. FEELING TIRED OR HAVING LITTLE ENERGY: 3
7. TROUBLE CONCENTRATING ON THINGS, SUCH AS READING THE NEWSPAPER OR WATCHING TELEVISION: 3
SUM OF ALL RESPONSES TO PHQ9 QUESTIONS 1 & 2: 0
6. FEELING BAD ABOUT YOURSELF - OR THAT YOU ARE A FAILURE OR HAVE LET YOURSELF OR YOUR FAMILY DOWN: 0
3. TROUBLE FALLING OR STAYING ASLEEP: 3
2. FEELING DOWN, DEPRESSED OR HOPELESS: 0
9. THOUGHTS THAT YOU WOULD BE BETTER OFF DEAD, OR OF HURTING YOURSELF: 0

## 2022-08-05 ASSESSMENT — SOCIAL DETERMINANTS OF HEALTH (SDOH): HOW HARD IS IT FOR YOU TO PAY FOR THE VERY BASICS LIKE FOOD, HOUSING, MEDICAL CARE, AND HEATING?: NOT HARD AT ALL

## 2022-08-05 NOTE — PROGRESS NOTES
Portions of this chart may have been created with voice recognition software. Occasional wrong-word or \"sound-alike\" substitutions may have occurred due to the inherent limitations of voice recognition software. Read the chart carefully and recognize, using context, where these substitutions have occurred        Chief Complaint     New Patient (Est. )               57Ingrid Pepe is a 51 y.o. female here for establishing care with me . Patient has a history of several mental health issues including attention deficit, anxiety. From records does seem like patient had a history of bipolar disorder and diagnosed 1 time with depression and anxiety and adjustment disorder. Patient states that she is not depressed anymore and she only has occasional anxiety for which she takes Valium as needed. She continues to take Adderall for her attention deficit symptoms. She states that because of vacation and not being in the school year she only takes it as needed but come school year starts she will need her regular prescriptions. States that that she has tried her medications in the past without much help and these are the only medications that will help for her. She has insomnia and she states that she takes clonidine 3tablets before bedtime and the does help her go to sleep. She states that she also drinks a glass of wine along with it to help her go to sleep. Does report symptoms related to anxiety and panic attacks. Has not been to psychiatry for evaluation or management in the past.  We will refer to psychiatry for further evaluation and management for her chronic mental health problems. Patient also states that she has autistic spectrum disorder, clinical diagnosis of EDS as well.   Currently no worsening extremes of mood changes or any other significant complaints at this time          REVIEW OF SYSTEMS:  CONSTITUTIONAL: No weight loss, fever, positive for fatigue, occasional positional vertigo. HEENT:No sore throat, runny nose, earache. No vision or hearing disturbances   CARDIOVASCULAR: No chest pain,No palpitations, positive for occasional lower extremity swelling. RESPIRATORY : No shortness of breath, cough. GASTROINTESTINAL: No nausea, vomiting, diarrhea or constipation. No abdominal pain. GENITOURINARY:No dysuria, urgency, frequency, hematuria. NEUROLOGICAL: Positive for chronic low back pain, sciatica, peripheral neuropathy on left foot. MUSCULOSKELETAL: No muscle, back pain, joint pain or stiffness. PSYCHIATRIC : As described above  SKIN: No rash or itching.       Lab Results   Component Value Date    WBC 4.1 09/01/2021    HGB 13.2 09/01/2021    HCT 39.4 09/01/2021    MCV 91.1 09/01/2021     09/01/2021      No results found for: LABA1C  No results found for: EAG  No results found for: TSHFT4, TSH  Lab Results   Component Value Date    CHOL 172 02/26/2019     Lab Results   Component Value Date    TRIG 112 02/26/2019     Lab Results   Component Value Date    HDL 72 (H) 02/26/2019     Lab Results   Component Value Date    LDLCALC 78 02/26/2019     Lab Results   Component Value Date    LABVLDL 22 02/26/2019     No results found for: [facility]   Lab Results   Component Value Date     09/01/2021    K 4.5 09/01/2021     09/01/2021    CO2 21 09/01/2021    BUN 9 09/01/2021    CREATININE 0.6 09/01/2021    GLUCOSE 72 09/01/2021    CALCIUM 8.9 09/01/2021    PROT 7.1 09/01/2021    LABALBU 4.0 09/01/2021    BILITOT 0.3 09/01/2021    ALKPHOS 65 09/01/2021    AST 31 09/01/2021    ALT 9 (L) 09/01/2021    LABGLOM >60 09/01/2021    GFRAA >60 09/01/2021    AGRATIO 1.3 09/01/2021    GLOB 3.1 09/01/2021           Current Outpatient Medications   Medication Sig Dispense Refill    diazePAM (VALIUM) 5 MG tablet       Cetirizine HCl (ZYRTEC ALLERGY PO) Take by mouth      cloNIDine (CATAPRES) 0.3 MG tablet TAKE THREE TABLETS BY MOUTH EVERY EVENING 90 tablet 2    meloxicam (MOBIC) 7.5 MG tablet TAKE ONE TABLET BY MOUTH DAILY 30 tablet 5    omeprazole (PRILOSEC) 40 MG delayed release capsule TAKE ONE CAPSULE BY MOUTH EVERY MORNING BEFORE BREAKFAST 30 capsule 5    amphetamine-dextroamphetamine (ADDERALL, 15MG,) 15 MG tablet Take 1 tablet by mouth every morning for 30 days. 30 tablet 0    amphetamine-dextroamphetamine (ADDERALL, 10MG,) 10 MG tablet Take 1 tablet po mid-day. 30 tablet 0    amphetamine-dextroamphetamine (ADDERALL, 15MG,) 15 MG tablet Take 1 tablet by mouth every morning for 30 days. 30 tablet 0    amphetamine-dextroamphetamine (ADDERALL, 15MG,) 15 MG tablet Take 1 tablet by mouth every morning for 30 doses. 30 tablet 0    amphetamine-dextroamphetamine (ADDERALL, 10MG,) 10 MG tablet Take 1 tablet po mid-day. 30 tablet 0    amphetamine-dextroamphetamine (ADDERALL, 10MG,) 10 MG tablet Take 1 tablet po mid-day. 30 tablet 0    amphetamine-dextroamphetamine (ADDERALL, 10MG,) 10 MG tablet Take 1 tablet by mouth 2 times daily for 60 doses. 60 tablet 0     No current facility-administered medications for this visit. Allergies   Allergen Reactions    Compazine [Prochlorperazine Maleate] Other (See Comments)     Makes her \"crazy\"    Strattera [Atomoxetine] Other (See Comments)     Side effects: headaches, dizziness, dry heaving, hot flashes.     Sulfa Antibiotics Rash    Fluoxetine      ALL SSRI'S-CAN NOT FUNCTION WHILE TAKING THEM     Prochlorperazine Nausea Only     Other reaction(s): Unknown  Also causes anxiety           Past Medical History:   Diagnosis Date    Bipolar I disorder with depression ([facility] [address] 8/5/2022    Depression     Depression with anxiety 8/5/2022    EDS (Tiffany-Danlos syndrome)     Lumbago 8/5/2022    Obesity 8/5/2022    Osteoarthritis          Past Surgical History:   Procedure Laterality Date    ABDOMINOPLASTY  2013    GASTRECTOMY  2018    partial-band removed    GASTRIC BAND  2004    LUMBAR DISCECTOMY Bilateral     L 4/5- 2013- [address]  2001 TIBIA FRACTURE SURGERY      tibial plateu fx    TUBAL LIGATION      WRIST SURGERY Left     1997         Family History   Problem Relation Age of Onset    Arthritis Mother         Social History     Socioeconomic History    Marital status:      Spouse name: None    Number of children: None    Years of education: None    Highest education level: None   Tobacco Use    Smoking status: Former     Packs/day: 0.20     Types: Cigarettes    Smokeless tobacco: Never    Tobacco comments:     pt. quite a mth ago   Vaping Use    Vaping Use: Never used   Substance and Sexual Activity    Alcohol use: Yes     Comment: couple glasses wine nite    Drug use: No     Social Determinants of Health     Financial Resource Strain: Low Risk     Difficulty of Paying Living Expenses: Not hard at all   Food Insecurity: No Food Insecurity    Worried About [address] [employer] in the Last Year: Never true    [facility] [employer] in the Last Year: Never true          OBJECTIVE:      Vitals:    08/05/22 0908   BP: 120/84   Pulse: 95   Temp: 97.5 °F (36.4 °C)   SpO2: 99%   Weight: 199 lb 6.4 oz (90.4 kg)   Height: 5' 8\" (1.727 m)         Constitutional: Patient appears well-nourished, not in any distress. HENT:  Head: Normocephalic and atraumatic. Eyes: Conjunctivae and EOM are normal  Right Ear: External ear normal.  Left Ear: External ear normal.   Nose: Nose normal.  Mouth/Throat: Oropharynx is clear and moist.   Neck: Normal range of motion. Neck supple. Lymphatic: No cervical lymphadenopathy  Cardiovascular: Normal rate, regular rhythm, normal heart sounds and intact distal pulses. Pulmonary/Chest: Effort normal and breath sounds normal, no wheezes or rhonchi. Neurological:Patient is alert, oriented to person, place, and time.   No obvious focal neurological deficits  Skin: Skin is warm and moist.  Psychiatric:Patient has a normal mood and affect, behavior is normal. Judgment and thought content normal.    ASSESSMENT AND PLAN    [facility] patient. Diagnoses and all orders for this visit:    Attention deficit disorder (ADD) in adult  -     Amb External Referral To Psychiatry    Bipolar I disorder with depression ([facility] [address]  -     Amb External Referral To Psychiatry    Depression with anxiety  -     Amb External Referral To Psychiatry    Adjustment disorder with anxiety  -     cloNIDine (CATAPRES) 0.3 MG tablet; TAKE THREE TABLETS BY MOUTH EVERY EVENING           DISCHARGE MEDICATION LIST        Medication List            Accurate as of August 5, 2022  9:38 AM. If you have any questions, ask your nurse or doctor. CONTINUE taking these medications      * amphetamine-dextroamphetamine 10 MG tablet  Commonly known as: ADDERALL (10MG)  Take 1 tablet by mouth 2 times daily for 60 doses. * amphetamine-dextroamphetamine 15 MG tablet  Commonly known as: ADDERALL (15MG)  Take 1 tablet by mouth every morning for 30 doses. * amphetamine-dextroamphetamine 10 MG tablet  Commonly known as: ADDERALL (10MG)  Take 1 tablet po mid-day. * amphetamine-dextroamphetamine 15 MG tablet  Commonly known as: ADDERALL (15MG)  Take 1 tablet by mouth every morning for 30 days. * amphetamine-dextroamphetamine 10 MG tablet  Commonly known as: ADDERALL (10MG)  Take 1 tablet po mid-day. * amphetamine-dextroamphetamine 15 MG tablet  Commonly known as: ADDERALL (15MG)  Take 1 tablet by mouth every morning for 30 days. * amphetamine-dextroamphetamine 10 MG tablet  Commonly known as: ADDERALL (10MG)  Take 1 tablet po mid-day.      cloNIDine 0.3 MG tablet  Commonly known as: CATAPRES  TAKE THREE TABLETS BY MOUTH EVERY EVENING     diazePAM 5 MG tablet  Commonly known as: VALIUM     meloxicam 7.5 MG tablet  Commonly known as: MOBIC  TAKE ONE TABLET BY MOUTH DAILY     omeprazole 40 MG delayed release capsule  Commonly known as: PRILOSEC  TAKE ONE CAPSULE BY MOUTH EVERY MORNING BEFORE BREAKFAST     ZYRTEC ALLERGY PO           * This list has 7 medication(s) that are the same as other medications prescribed for you. Read the directions carefully, and ask your doctor or other care provider to review them with you. Where to Get Your Medications        These medications were sent to [facility] [id number], [address] [place] [address], [address] [place] [id number]      Phone: [phone]   cloNIDine 0.3 MG tablet          Return in about 3 months (around 11/5/2022), or if symptoms worsen or fail to improve, for Annual Physical .     Please refer to diagnosis, orders and patient instructions for further recommendations given. All patient's questions and concerns were addressed appropriately. All orders, handouts were reviewed in detail with the patient and patient voiced understanding verbally.

## 2022-09-20 ENCOUNTER — OFFICE VISIT (OUTPATIENT)
Dept: PSYCHIATRY | Age: 47
End: 2022-09-20
Payer: OTHER GOVERNMENT

## 2022-09-20 VITALS
HEART RATE: 81 BPM | TEMPERATURE: 97.8 F | SYSTOLIC BLOOD PRESSURE: 130 MMHG | DIASTOLIC BLOOD PRESSURE: 86 MMHG | OXYGEN SATURATION: 100 % | WEIGHT: 202 LBS | HEIGHT: 68 IN | BODY MASS INDEX: 30.62 KG/M2

## 2022-09-20 DIAGNOSIS — F90.0 ATTENTION DEFICIT HYPERACTIVITY DISORDER (ADHD), PREDOMINANTLY INATTENTIVE TYPE: Primary | ICD-10-CM

## 2022-09-20 DIAGNOSIS — F41.1 GAD (GENERALIZED ANXIETY DISORDER): ICD-10-CM

## 2022-09-20 DIAGNOSIS — F51.01 PRIMARY INSOMNIA: ICD-10-CM

## 2022-09-20 DIAGNOSIS — F90.0 ATTENTION DEFICIT HYPERACTIVITY DISORDER (ADHD), PREDOMINANTLY INATTENTIVE TYPE: ICD-10-CM

## 2022-09-20 DIAGNOSIS — F33.1 MAJOR DEPRESSIVE DISORDER, RECURRENT EPISODE, MODERATE (HCC): ICD-10-CM

## 2022-09-20 LAB
AMPHETAMINE SCREEN, URINE: ABNORMAL
BARBITURATE SCREEN URINE: ABNORMAL
BENZODIAZEPINE SCREEN, URINE: POSITIVE
CANNABINOID SCREEN URINE: ABNORMAL
COCAINE METABOLITE SCREEN URINE: ABNORMAL
FENTANYL SCREEN, URINE: ABNORMAL
Lab: ABNORMAL
METHADONE SCREEN, URINE: ABNORMAL
OPIATE SCREEN URINE: ABNORMAL
OXYCODONE URINE: ABNORMAL
PH UA: 5.5
PHENCYCLIDINE SCREEN URINE: ABNORMAL

## 2022-09-20 PROCEDURE — 99205 OFFICE O/P NEW HI 60 MIN: CPT | Performed by: REGISTERED NURSE

## 2022-09-20 RX ORDER — DULOXETIN HYDROCHLORIDE 20 MG/1
20 CAPSULE, DELAYED RELEASE ORAL DAILY
Qty: 60 CAPSULE | Refills: 0 | Status: SHIPPED | OUTPATIENT
Start: 2022-09-20 | End: 2022-10-20

## 2022-09-20 ASSESSMENT — ANXIETY QUESTIONNAIRES
5. BEING SO RESTLESS THAT IT IS HARD TO SIT STILL: 2
6. BECOMING EASILY ANNOYED OR IRRITABLE: 3
1. FEELING NERVOUS, ANXIOUS, OR ON EDGE: 3
4. TROUBLE RELAXING: 2
2. NOT BEING ABLE TO STOP OR CONTROL WORRYING: 2
3. WORRYING TOO MUCH ABOUT DIFFERENT THINGS: 2
GAD7 TOTAL SCORE: 16
7. FEELING AFRAID AS IF SOMETHING AWFUL MIGHT HAPPEN: 2

## 2022-09-20 ASSESSMENT — PATIENT HEALTH QUESTIONNAIRE - PHQ9
1. LITTLE INTEREST OR PLEASURE IN DOING THINGS: 3
7. TROUBLE CONCENTRATING ON THINGS, SUCH AS READING THE NEWSPAPER OR WATCHING TELEVISION: 3
SUM OF ALL RESPONSES TO PHQ QUESTIONS 1-9: 20
3. TROUBLE FALLING OR STAYING ASLEEP: 3
SUM OF ALL RESPONSES TO PHQ QUESTIONS 1-9: 20
2. FEELING DOWN, DEPRESSED OR HOPELESS: 3
5. POOR APPETITE OR OVEREATING: 3
SUM OF ALL RESPONSES TO PHQ QUESTIONS 1-9: 20
4. FEELING TIRED OR HAVING LITTLE ENERGY: 3
SUM OF ALL RESPONSES TO PHQ9 QUESTIONS 1 & 2: 6
SUM OF ALL RESPONSES TO PHQ QUESTIONS 1-9: 20
6. FEELING BAD ABOUT YOURSELF - OR THAT YOU ARE A FAILURE OR HAVE LET YOURSELF OR YOUR FAMILY DOWN: 0
9. THOUGHTS THAT YOU WOULD BE BETTER OFF DEAD, OR OF HURTING YOURSELF: 0
8. MOVING OR SPEAKING SO SLOWLY THAT OTHER PEOPLE COULD HAVE NOTICED. OR THE OPPOSITE, BEING SO FIGETY OR RESTLESS THAT YOU HAVE BEEN MOVING AROUND A LOT MORE THAN USUAL: 2

## 2022-09-20 NOTE — PATIENT INSTRUCTIONS
Anti-depressant drugs: This class of drugs can cause sedation, blurred vision, dry-mouth, constipation, postural hypotension, urinary retention, tachycardia, muscle tremors, agitation, headache, skin rash, photo sensitivity, excessive weight gain, glaucoma, heart disease, lowering seizure threshold, increase risk of suicidal thinking or ideations, excessive sweating, sextual dysfunction, insomnia, anxiety, bruxism, GI bleeding, pregnancy complications and birth defects, possible death, etc.  Anti-anxiety drugs: Sedation, morning hangover, ataxia, nausea, respiratory depression, decrease cognitive function, light-headiness, withdrawal effects, anterograde amnesia, possible death, etc.   Here are some of Psychiatric Emergency resources for you:     National suicide hotlines: 988, 6-050-083-TALK (7-128.263.4379) and 3-312-JQNKXCY (8-758.275.8418). 2.  Call 911 or go to any nearest emergency room   3.    Access the [place] [address] Emergency Psychiatry Services:     - Go to the [facility] Psychiatric Emergency Services (PES) at [address], [address] [facility]   - Call the [address] at [phone].    - Call the [facility] Mobile Crisis Team at [phone]

## 2022-09-21 RX ORDER — DEXTROAMPHETAMINE SACCHARATE, AMPHETAMINE ASPARTATE, DEXTROAMPHETAMINE SULFATE AND AMPHETAMINE SULFATE 3.75; 3.75; 3.75; 3.75 MG/1; MG/1; MG/1; MG/1
15 TABLET ORAL 2 TIMES DAILY
Qty: 60 TABLET | Refills: 0 | Status: SHIPPED | OUTPATIENT
Start: 2022-09-21 | End: 2022-10-21

## 2022-09-28 ENCOUNTER — OFFICE VISIT (OUTPATIENT)
Dept: FAMILY MEDICINE CLINIC | Age: 47
End: 2022-09-28
Payer: OTHER GOVERNMENT

## 2022-09-28 ENCOUNTER — HOSPITAL ENCOUNTER (OUTPATIENT)
Dept: GENERAL RADIOLOGY | Age: 47
Discharge: HOME | End: 2022-09-28
Payer: OTHER GOVERNMENT

## 2022-09-28 VITALS
DIASTOLIC BLOOD PRESSURE: 84 MMHG | HEART RATE: 83 BPM | WEIGHT: 198.6 LBS | TEMPERATURE: 97.2 F | BODY MASS INDEX: 30.1 KG/M2 | OXYGEN SATURATION: 99 % | SYSTOLIC BLOOD PRESSURE: 112 MMHG | HEIGHT: 68 IN

## 2022-09-28 DIAGNOSIS — M54.9 MUSCULOSKELETAL BACK PAIN: ICD-10-CM

## 2022-09-28 DIAGNOSIS — R06.02 SOB (SHORTNESS OF BREATH): Primary | ICD-10-CM

## 2022-09-28 DIAGNOSIS — R06.02 SOB (SHORTNESS OF BREATH): ICD-10-CM

## 2022-09-28 DIAGNOSIS — U07.1 COVID-19 VIRUS INFECTION: ICD-10-CM

## 2022-09-28 DIAGNOSIS — G93.31 POST VIRAL SYNDROME: ICD-10-CM

## 2022-09-28 LAB
A/G RATIO: 1.7 (ref 1.1–2.2)
ALBUMIN SERPL-MCNC: 4.6 G/DL (ref 3.4–5)
ALP BLD-CCNC: 75 U/L (ref 40–129)
ALT SERPL-CCNC: 39 U/L (ref 10–40)
ANION GAP SERPL CALCULATED.3IONS-SCNC: 12 MMOL/L (ref 3–16)
AST SERPL-CCNC: 64 U/L (ref 15–37)
BASOPHILS ABSOLUTE: 0 K/UL (ref 0–0.2)
BASOPHILS RELATIVE PERCENT: 0.9 %
BILIRUB SERPL-MCNC: 0.7 MG/DL (ref 0–1)
BUN BLDV-MCNC: 11 MG/DL (ref 7–20)
CALCIUM SERPL-MCNC: 10.2 MG/DL (ref 8.3–10.6)
CHLORIDE BLD-SCNC: 102 MMOL/L (ref 99–110)
CO2: 28 MMOL/L (ref 21–32)
CREAT SERPL-MCNC: 0.8 MG/DL (ref 0.6–1.1)
EOSINOPHILS ABSOLUTE: 0 K/UL (ref 0–0.6)
EOSINOPHILS RELATIVE PERCENT: 1 %
GFR AFRICAN AMERICAN: >60
GFR NON-AFRICAN AMERICAN: >60
GLUCOSE BLD-MCNC: 101 MG/DL (ref 70–99)
HCT VFR BLD CALC: 45.4 % (ref 36–48)
HEMOGLOBIN: 15.2 G/DL (ref 12–16)
LYMPHOCYTES ABSOLUTE: 1 K/UL (ref 1–5.1)
LYMPHOCYTES RELATIVE PERCENT: 19.6 %
MCH RBC QN AUTO: 31.9 PG (ref 26–34)
MCHC RBC AUTO-ENTMCNC: 33.5 G/DL (ref 31–36)
MCV RBC AUTO: 95.2 FL (ref 80–100)
MONOCYTES ABSOLUTE: 0.7 K/UL (ref 0–1.3)
MONOCYTES RELATIVE PERCENT: 13.8 %
NEUTROPHILS ABSOLUTE: 3.2 K/UL (ref 1.7–7.7)
NEUTROPHILS RELATIVE PERCENT: 64.7 %
PDW BLD-RTO: 14.6 % (ref 12.4–15.4)
PLATELET # BLD: 181 K/UL (ref 135–450)
PMV BLD AUTO: 8.9 FL (ref 5–10.5)
POTASSIUM SERPL-SCNC: 4.5 MMOL/L (ref 3.5–5.1)
RBC # BLD: 4.77 M/UL (ref 4–5.2)
SODIUM BLD-SCNC: 142 MMOL/L (ref 136–145)
TOTAL PROTEIN: 7.3 G/DL (ref 6.4–8.2)
TSH SERPL DL<=0.05 MIU/L-ACNC: 1.88 UIU/ML (ref 0.27–4.2)
WBC # BLD: 5 K/UL (ref 4–11)

## 2022-09-28 PROCEDURE — 71046 X-RAY EXAM CHEST 2 VIEWS: CPT

## 2022-09-28 PROCEDURE — 99214 OFFICE O/P EST MOD 30 MIN: CPT | Performed by: FAMILY MEDICINE

## 2022-09-28 RX ORDER — METHOCARBAMOL 500 MG/1
500 TABLET, FILM COATED ORAL NIGHTLY PRN
Qty: 40 TABLET | Refills: 0 | Status: SHIPPED | OUTPATIENT
Start: 2022-09-28 | End: 2022-10-08

## 2022-09-28 RX ORDER — AMOXICILLIN AND CLAVULANATE POTASSIUM 875; 125 MG/1; MG/1
1 TABLET, FILM COATED ORAL 2 TIMES DAILY
Qty: 14 TABLET | Refills: 0 | Status: SHIPPED | OUTPATIENT
Start: 2022-09-28 | End: 2022-10-05

## 2022-09-28 ASSESSMENT — PATIENT HEALTH QUESTIONNAIRE - PHQ9
2. FEELING DOWN, DEPRESSED OR HOPELESS: 0
3. TROUBLE FALLING OR STAYING ASLEEP: 0
SUM OF ALL RESPONSES TO PHQ QUESTIONS 1-9: 6
5. POOR APPETITE OR OVEREATING: 0
SUM OF ALL RESPONSES TO PHQ QUESTIONS 1-9: 6
SUM OF ALL RESPONSES TO PHQ QUESTIONS 1-9: 6
7. TROUBLE CONCENTRATING ON THINGS, SUCH AS READING THE NEWSPAPER OR WATCHING TELEVISION: 3
4. FEELING TIRED OR HAVING LITTLE ENERGY: 0
8. MOVING OR SPEAKING SO SLOWLY THAT OTHER PEOPLE COULD HAVE NOTICED. OR THE OPPOSITE, BEING SO FIGETY OR RESTLESS THAT YOU HAVE BEEN MOVING AROUND A LOT MORE THAN USUAL: 3
1. LITTLE INTEREST OR PLEASURE IN DOING THINGS: 0
10. IF YOU CHECKED OFF ANY PROBLEMS, HOW DIFFICULT HAVE THESE PROBLEMS MADE IT FOR YOU TO DO YOUR WORK, TAKE CARE OF THINGS AT HOME, OR GET ALONG WITH OTHER PEOPLE: 1
SUM OF ALL RESPONSES TO PHQ9 QUESTIONS 1 & 2: 0
SUM OF ALL RESPONSES TO PHQ QUESTIONS 1-9: 6
6. FEELING BAD ABOUT YOURSELF - OR THAT YOU ARE A FAILURE OR HAVE LET YOURSELF OR YOUR FAMILY DOWN: 0
9. THOUGHTS THAT YOU WOULD BE BETTER OFF DEAD, OR OF HURTING YOURSELF: 0

## 2022-09-28 NOTE — PROGRESS NOTES
Portions of this chart may have been created with voice recognition software. Occasional wrong-word or \"sound-alike\" substitutions may have occurred due to the inherent limitations of voice recognition software. Read the chart carefully and recognize, using context, where these substitutions have occurred        Chief Complaint     Post-COVID Symptoms (Tested POS 8/22. Sore throat, wet cough, dizziness, fogginess, fatigue, sensitivities to smells, SOB after exertion, denies chest pain. Needs full blood panel)               IVONNE Alejandro is a 51 y.o. female here for the following concerns. Patient had COVID-19 virus infection in the last week of August.  He aches. Symptoms of severe body aches severe fatigue, upper respiratory infection symptoms. She had been doing conservative management so far but still continues to have a persistent symptoms. She does not have any fever or chills anymore however still continues to have sinus pressure, pain, productive cough, sore throat dizziness, foggy feeling in her head, able to concentrate and focus. Still continues to have excessive fatigue, shortness of breath, extreme exhaustion, sensitivities to smell. She denies any chest pain she denies any syncopal episodes. Does have some abdominal discomfort acid reflux symptoms, nausea no appetite. No irregular bowel movements. Sleep has been affected mood has been affected as well as ability to focus has been worsened since all of these. Also has some lower back pain which is chronic for her but has been exacerbated recently. Not taking anything for the symptoms yet. No numbness tingling sensation weakness or any other significant diarrhea or bowel incontinence at this time. Recommended that we will evaluate further for the symptoms as well as expected course of recovery from COVID-19 virus infection  discussed with the patient.   Reviewed conservative management with over-the-counter cough supplements, oral decongestants, antihistamines and NSAIDs discussed with the patient. REVIEW OF SYSTEMS:  Pertinent symptoms noted in HPI      Lab Results   Component Value Date    WBC 4.1 09/01/2021    HGB 13.2 09/01/2021    HCT 39.4 09/01/2021    MCV 91.1 09/01/2021     09/01/2021      No results found for: LABA1C  No results found for: EAG  No results found for: TSHFT4, TSH  Lab Results   Component Value Date    CHOL 172 02/26/2019     Lab Results   Component Value Date    TRIG 112 02/26/2019     Lab Results   Component Value Date    HDL 72 (H) 02/26/2019     Lab Results   Component Value Date    LDLCALC 78 02/26/2019     Lab Results   Component Value Date    LABVLDL 22 02/26/2019     No results found for: [facility]   Lab Results   Component Value Date     09/01/2021    K 4.5 09/01/2021     09/01/2021    CO2 21 09/01/2021    BUN 9 09/01/2021    CREATININE 0.6 09/01/2021    GLUCOSE 72 09/01/2021    CALCIUM 8.9 09/01/2021    PROT 7.1 09/01/2021    LABALBU 4.0 09/01/2021    BILITOT 0.3 09/01/2021    ALKPHOS 65 09/01/2021    AST 31 09/01/2021    ALT 9 (L) 09/01/2021    LABGLOM >60 09/01/2021    GFRAA >60 09/01/2021    AGRATIO 1.3 09/01/2021    GLOB 3.1 09/01/2021           Current Outpatient Medications   Medication Sig Dispense Refill    amoxicillin-clavulanate (AUGMENTIN) 875-125 MG per tablet Take 1 tablet by mouth 2 times daily for 7 days 14 tablet 0    methocarbamol (ROBAXIN) 500 MG tablet Take 1 tablet by mouth nightly as needed (back pain) 40 tablet 0    amphetamine-dextroamphetamine (ADDERALL, 15MG,) 15 MG tablet Take 1 tablet by mouth 2 times daily for 30 days.  Take one tablet Adderall 15 mg twice daily 60 tablet 0    DULoxetine (CYMBALTA) 20 MG extended release capsule Take 1 capsule by mouth daily 60 capsule 0    Cetirizine HCl (ZYRTEC ALLERGY PO) Take by mouth      cloNIDine (CATAPRES) 0.3 MG tablet TAKE THREE TABLETS BY MOUTH EVERY EVENING 90 tablet 2    omeprazole (PRILOSEC) 40 MG delayed release capsule TAKE ONE CAPSULE BY MOUTH EVERY MORNING BEFORE BREAKFAST 30 capsule 5     No current facility-administered medications for this visit. Allergies   Allergen Reactions    Compazine [Prochlorperazine Maleate] Other (See Comments)     Makes her \"crazy\"    Strattera [Atomoxetine] Other (See Comments)     Side effects: headaches, dizziness, dry heaving, hot flashes.     Sulfa Antibiotics Rash    Fluoxetine      ALL SSRI'S-CAN NOT FUNCTION WHILE TAKING THEM     Prochlorperazine Nausea Only     Other reaction(s): Unknown  Also causes anxiety           Past Medical History:   Diagnosis Date    Bipolar I disorder with depression ([address] 8/5/2022    Depression     Depression with anxiety 8/5/2022    EDS (Tiffany-Danlos syndrome)     Lumbago 8/5/2022    Obesity 8/5/2022    Osteoarthritis          Past Surgical History:   Procedure Laterality Date    ABDOMINOPLASTY  2013    GASTRECTOMY  2018    partial-band removed    GASTRIC BAND  2004    LUMBAR DISCECTOMY Bilateral     L 4/5- 2013- [address]    TIBIA FRACTURE SURGERY      tibial plateu fx    TUBAL LIGATION      WRIST SURGERY Left     1997         Family History   Problem Relation Age of Onset    Arthritis Mother         Social History     Socioeconomic History    Marital status:      Spouse name: None    Number of children: None    Years of education: None    Highest education level: None   Tobacco Use    Smoking status: Former     Packs/day: 0.20     Types: Cigarettes    Smokeless tobacco: Never    Tobacco comments:     pt. quite a mth ago   Vaping Use    Vaping Use: Never used   Substance and Sexual Activity    Alcohol use: Yes     Comment: couple glasses wine nite    Drug use: No     Social Determinants of Health     Financial Resource Strain: Low Risk     Difficulty of Paying Living Expenses: Not hard at all   Food Insecurity: No Food Insecurity    Worried About Running Out of Food in the Last Year: Never true    Ran Out of Food in the Last Year: Never true          OBJECTIVE:      Vitals:    09/28/22 1010   BP: 112/84   Pulse: 83   Temp: 97.2 °F (36.2 °C)   SpO2: 99%   Weight: 198 lb 9.6 oz (90.1 kg)   Height: 5' 7.5\" (1.715 m)         Constitutional: Patient appears well-nourished, not in any distress. HENT:  Head: Normocephalic and atraumatic. Maxillary frontal sinus tenderness  Eyes: Conjunctivae and EOM are normal  Right Ear: External ear normal.  Serous middle ear effusion seen  Left Ear: External ear normal.  Serous middle ear effusion seen  Nose: Mucosa is pale and congested. Mouth/Throat: Oropharynx is clear and moist.  Mild pharyngeal erythema noted. Neck: Normal range of motion. Neck supple. Lymphatic: No cervical lymphadenopathy  Cardiovascular: Normal rate, regular rhythm, normal heart sounds and intact distal pulses. Pulmonary/Chest: Effort normal and breath sounds normal, no wheezes or rhonchi. Neurological:Patient is alert, oriented to person, place, and time. No obvious focal neurological deficits  Skin: Skin is warm and moist.  Psychiatric:Patient has a normal mood and affect, behavior is normal. Judgment and thought content normal.    ASSESSMENT AND PLAN    Kenia Officer was seen today for post-covid symptoms. Diagnoses and all orders for this visit:    SOB (shortness of breath)  -     CBC with Auto Differential; Future  -     Comprehensive Metabolic Panel; Future  -     TSH; Future  -     XR CHEST STANDARD (2 VW); Future    COVID-19 virus infection    Post viral syndrome    Musculoskeletal back pain    Other orders  -     amoxicillin-clavulanate (AUGMENTIN) 875-125 MG per tablet; Take 1 tablet by mouth 2 times daily for 7 days  -     methocarbamol (ROBAXIN) 500 MG tablet; Take 1 tablet by mouth nightly as needed (back pain)           DISCHARGE MEDICATION LIST        Medication List            Accurate as of September 28, 2022 12:30 PM. If you have any questions, ask your nurse or doctor. START taking these medications      amoxicillin-clavulanate 875-125 MG per tablet  Commonly known as: AUGMENTIN  Take 1 tablet by mouth 2 times daily for 7 days  Started by: Helene Thakur MD     methocarbamol 500 MG tablet  Commonly known as: Robaxin  Take 1 tablet by mouth nightly as needed (back pain)  Started by: Helene Thakur MD            CONTINUE taking these medications      amphetamine-dextroamphetamine 15 MG tablet  Commonly known as: ADDERALL (15MG)  Take 1 tablet by mouth 2 times daily for 30 days. Take one tablet Adderall 15 mg twice daily     cloNIDine 0.3 MG tablet  Commonly known as: CATAPRES  TAKE THREE TABLETS BY MOUTH EVERY EVENING     DULoxetine 20 MG extended release capsule  Commonly known as: Cymbalta  Take 1 capsule by mouth daily     omeprazole 40 MG delayed release capsule  Commonly known as: PRILOSEC  TAKE ONE CAPSULE BY MOUTH EVERY MORNING BEFORE BREAKFAST     ZYRTEC ALLERGY PO               Where to Get Your Medications        These medications were sent to [facility] [id number] [place] [address]      Phone: [phone]   amoxicillin-clavulanate 875-125 MG per tablet  methocarbamol 500 MG tablet          Return in about 3 weeks (around 10/19/2022). Please refer to diagnosis, orders and patient instructions for further recommendations given. All patient's questions and concerns were addressed appropriately. All orders, handouts were reviewed in detail with the patient and patient voiced understanding verbally.

## 2022-10-19 ENCOUNTER — OFFICE VISIT (OUTPATIENT)
Dept: FAMILY MEDICINE CLINIC | Age: 47
End: 2022-10-19
Payer: OTHER GOVERNMENT

## 2022-10-19 VITALS
SYSTOLIC BLOOD PRESSURE: 112 MMHG | DIASTOLIC BLOOD PRESSURE: 80 MMHG | TEMPERATURE: 97.2 F | OXYGEN SATURATION: 98 % | HEART RATE: 100 BPM | WEIGHT: 203.6 LBS | HEIGHT: 68 IN | BODY MASS INDEX: 30.86 KG/M2

## 2022-10-19 DIAGNOSIS — G93.31 POSTVIRAL FATIGUE SYNDROME: ICD-10-CM

## 2022-10-19 DIAGNOSIS — R11.2 NAUSEA AND VOMITING, UNSPECIFIED VOMITING TYPE: Primary | ICD-10-CM

## 2022-10-19 DIAGNOSIS — K21.9 GASTROESOPHAGEAL REFLUX DISEASE, UNSPECIFIED WHETHER ESOPHAGITIS PRESENT: ICD-10-CM

## 2022-10-19 DIAGNOSIS — R06.02 SOB (SHORTNESS OF BREATH): ICD-10-CM

## 2022-10-19 PROCEDURE — 99213 OFFICE O/P EST LOW 20 MIN: CPT | Performed by: FAMILY MEDICINE

## 2022-10-19 RX ORDER — OMEPRAZOLE 40 MG/1
CAPSULE, DELAYED RELEASE ORAL
Qty: 90 CAPSULE | Refills: 1 | Status: SHIPPED | OUTPATIENT
Start: 2022-10-19

## 2022-10-19 ASSESSMENT — PATIENT HEALTH QUESTIONNAIRE - PHQ9
SUM OF ALL RESPONSES TO PHQ QUESTIONS 1-9: 0
1. LITTLE INTEREST OR PLEASURE IN DOING THINGS: 0
3. TROUBLE FALLING OR STAYING ASLEEP: 0
7. TROUBLE CONCENTRATING ON THINGS, SUCH AS READING THE NEWSPAPER OR WATCHING TELEVISION: 0
4. FEELING TIRED OR HAVING LITTLE ENERGY: 0
SUM OF ALL RESPONSES TO PHQ9 QUESTIONS 1 & 2: 0
9. THOUGHTS THAT YOU WOULD BE BETTER OFF DEAD, OR OF HURTING YOURSELF: 0
2. FEELING DOWN, DEPRESSED OR HOPELESS: 0
6. FEELING BAD ABOUT YOURSELF - OR THAT YOU ARE A FAILURE OR HAVE LET YOURSELF OR YOUR FAMILY DOWN: 0
5. POOR APPETITE OR OVEREATING: 0
SUM OF ALL RESPONSES TO PHQ QUESTIONS 1-9: 0
SUM OF ALL RESPONSES TO PHQ QUESTIONS 1-9: 0
8. MOVING OR SPEAKING SO SLOWLY THAT OTHER PEOPLE COULD HAVE NOTICED. OR THE OPPOSITE, BEING SO FIGETY OR RESTLESS THAT YOU HAVE BEEN MOVING AROUND A LOT MORE THAN USUAL: 0
10. IF YOU CHECKED OFF ANY PROBLEMS, HOW DIFFICULT HAVE THESE PROBLEMS MADE IT FOR YOU TO DO YOUR WORK, TAKE CARE OF THINGS AT HOME, OR GET ALONG WITH OTHER PEOPLE: 0
SUM OF ALL RESPONSES TO PHQ QUESTIONS 1-9: 0

## 2022-10-19 NOTE — PROGRESS NOTES
Portions of this chart may have been created with voice recognition software. Occasional wrong-word or \"sound-alike\" substitutions may have occurred due to the inherent limitations of voice recognition software. Read the chart carefully and recognize, using context, where these substitutions have occurred        Chief Complaint     Follow-up (3wk f/up. Pt states symptoms have improved slightly but now has gotten nausea)               Derek Vides is a 51 y.o. female here for the last visit. Patient states that she is feeling better than what she was the last time however still continue to improve after COVID-19 infection. Still has some fatigue however for the past 2 weeks she has been having some nausea especially in the mornings that she was also had vomiting twice triggered by external stimuli. She does have acid reflux symptoms. She forgets to take her medications regularly. She takes over the counter omeprazole as needed. No blood in stool no hematemesis no melena no constipation. In the last 2 weeks patient also had changes in her psychiatry medications adjusted. However she states that she quit taking Adderall and Cymbalta because of the symptoms. Recommended patient to discuss with a psychiatrist.  In the meantime we will refill patient's omeprazole to be taken as a 40 mg daily every day before breakfast and see if that will help her symptoms. Blood work and chest x-ray that was completed last time was within normal limits. Recommended patient to call back if symptoms do not improve or worsen.       REVIEW OF SYSTEMS:  Pertinent positive and negative symptoms noted in HPI        Lab Results   Component Value Date    WBC 5.0 09/28/2022    HGB 15.2 09/28/2022    HCT 45.4 09/28/2022    MCV 95.2 09/28/2022     09/28/2022      No results found for: LABA1C  No results found for: EAG  Lab Results   Component Value Date    TSH 1.88 09/28/2022     Lab Results   Component Value Date CHOL 172 02/26/2019     Lab Results   Component Value Date    TRIG 112 02/26/2019     Lab Results   Component Value Date    HDL 72 (H) 02/26/2019     Lab Results   Component Value Date    LDLCALC 78 02/26/2019     Lab Results   Component Value Date    LABVLDL 22 02/26/2019     No results found for: [facility]   Lab Results   Component Value Date     09/28/2022    K 4.5 09/28/2022     09/28/2022    CO2 28 09/28/2022    BUN 11 09/28/2022    CREATININE 0.8 09/28/2022    GLUCOSE 101 (H) 09/28/2022    CALCIUM 10.2 09/28/2022    PROT 7.3 09/28/2022    LABALBU 4.6 09/28/2022    BILITOT 0.7 09/28/2022    ALKPHOS 75 09/28/2022    AST 64 (H) 09/28/2022    ALT 39 09/28/2022    LABGLOM >60 09/28/2022    GFRAA >60 09/28/2022    AGRATIO 1.7 09/28/2022    GLOB 3.1 09/01/2021           Current Outpatient Medications   Medication Sig Dispense Refill    omeprazole (PRILOSEC) 40 MG delayed release capsule TAKE ONE CAPSULE BY MOUTH EVERY MORNING BEFORE BREAKFAST 90 capsule 1    cloNIDine (CATAPRES) 0.3 MG tablet TAKE THREE TABLETS BY MOUTH EVERY EVENING 90 tablet 2    amphetamine-dextroamphetamine (ADDERALL, 15MG,) 15 MG tablet Take 1 tablet by mouth 2 times daily for 30 days. Take one tablet Adderall 15 mg twice daily (Patient not taking: Reported on 10/19/2022) 60 tablet 0    DULoxetine (CYMBALTA) 20 MG extended release capsule Take 1 capsule by mouth daily (Patient not taking: Reported on 10/19/2022) 60 capsule 0    Cetirizine HCl (ZYRTEC ALLERGY PO) Take by mouth (Patient not taking: Reported on 10/19/2022)       No current facility-administered medications for this visit. Allergies   Allergen Reactions    Compazine [Prochlorperazine Maleate] Other (See Comments)     Makes her \"crazy\"    Strattera [Atomoxetine] Other (See Comments)     Side effects: headaches, dizziness, dry heaving, hot flashes.     Sulfa Antibiotics Rash    Fluoxetine      ALL SSRI'S-CAN NOT FUNCTION WHILE TAKING THEM     Prochlorperazine Nausea Only     Other reaction(s): Unknown  Also causes anxiety           Past Medical History:   Diagnosis Date    Bipolar I disorder with depression ([address] 8/5/2022    Depression     Depression with anxiety 8/5/2022    EDS (Tiffany-Danlos syndrome)     Lumbago 8/5/2022    Obesity 8/5/2022    Osteoarthritis          Past Surgical History:   Procedure Laterality Date    ABDOMINOPLASTY  2013    GASTRECTOMY  2018    partial-band removed    GASTRIC BAND  2004    LUMBAR DISCECTOMY Bilateral     L 4/5- 2013- [address]    TIBIA FRACTURE SURGERY      tibial plateu fx    TUBAL LIGATION      WRIST SURGERY Left     1997         Family History   Problem Relation Age of Onset    Arthritis Mother         Social History     Socioeconomic History    Marital status:      Spouse name: None    Number of children: None    Years of education: None    Highest education level: None   Tobacco Use    Smoking status: Former     Packs/day: 0.20     Types: Cigarettes    Smokeless tobacco: Never    Tobacco comments:     pt. quite a mth ago   Vaping Use    Vaping Use: Never used   Substance and Sexual Activity    Alcohol use: Yes     Comment: couple glasses wine nite    Drug use: No     Social Determinants of Health     Financial Resource Strain: Low Risk     Difficulty of Paying Living Expenses: Not hard at all   Food Insecurity: No Food Insecurity    Worried About Running Out of Food in the Last Year: Never true    Ran Out of Food in the Last Year: Never true          OBJECTIVE:      Vitals:    10/19/22 0935 10/19/22 1000   BP: 112/80    Pulse: (!) 117 100   Temp: 97.2 °F (36.2 °C)    SpO2: 98%    Weight: 203 lb 9.6 oz (92.4 kg)    Height: 5' 7.5\" (1.715 m)          Constitutional: Patient appears well-nourished, not in any distress. HENT:  Head: Normocephalic and atraumatic.    Eyes: Conjunctivae and EOM are normal  Right Ear: External ear normal.  Left Ear: External ear normal.   Nose: Nose normal.  Mouth/Throat: Oropharynx is clear and moist.   Neck: Normal range of motion. Neck supple. Lymphatic: No cervical lymphadenopathy  Cardiovascular: Normal rate, regular rhythm, normal heart sounds and intact distal pulses. Pulmonary/Chest: Effort normal and breath sounds normal, no wheezes or rhonchi. Neurological:Patient is alert, oriented to person, place, and time. No obvious focal neurological deficits  Skin: Skin is warm and moist.  Psychiatric:Patient has a normal mood and affect, behavior is normal. Judgment and thought content normal.    ASSESSMENT AND PLAN    Beka Vega was seen today for follow-up. Diagnoses and all orders for this visit:    Nausea and vomiting, unspecified vomiting type    Gastroesophageal reflux disease, unspecified whether esophagitis present  -     omeprazole (PRILOSEC) 40 MG delayed release capsule; TAKE ONE CAPSULE BY MOUTH EVERY MORNING BEFORE BREAKFAST    SOB (shortness of breath), improved    Postviral fatigue syndrome           DISCHARGE MEDICATION LIST        Medication List            Accurate as of October 19, 2022  1:15 PM. If you have any questions, ask your nurse or doctor. CONTINUE taking these medications      amphetamine-dextroamphetamine 15 MG tablet  Commonly known as: ADDERALL (15MG)  Take 1 tablet by mouth 2 times daily for 30 days.  Take one tablet Adderall 15 mg twice daily     cloNIDine 0.3 MG tablet  Commonly known as: CATAPRES  TAKE THREE TABLETS BY MOUTH EVERY EVENING     DULoxetine 20 MG extended release capsule  Commonly known as: Cymbalta  Take 1 capsule by mouth daily     omeprazole 40 MG delayed release capsule  Commonly known as: PRILOSEC  TAKE ONE CAPSULE BY MOUTH EVERY MORNING BEFORE BREAKFAST     ZYRTEC ALLERGY PO               Where to Get Your Medications        These medications were sent to [facility] [id number] [place], [place] - [address]  [address] [facility], [address] [place] [address]      Phone: [phone]   omeprazole 40 MG delayed release capsule          Return if symptoms worsen or fail to improve. Please refer to diagnosis, orders and patient instructions for further recommendations given. All patient's questions and concerns were addressed appropriately. All orders, handouts were reviewed in detail with the patient and patient voiced understanding verbally.

## 2022-10-30 DIAGNOSIS — F43.22 ADJUSTMENT DISORDER WITH ANXIETY: ICD-10-CM

## 2022-10-31 RX ORDER — CLONIDINE HYDROCHLORIDE 0.3 MG/1
TABLET ORAL
Qty: 90 TABLET | Refills: 2 | Status: SHIPPED | OUTPATIENT
Start: 2022-10-31

## 2022-11-02 ENCOUNTER — HOSPITAL ENCOUNTER (OUTPATIENT)
Dept: GENERAL RADIOLOGY | Age: 47
Discharge: HOME | End: 2022-11-02
Payer: OTHER GOVERNMENT

## 2022-11-02 ENCOUNTER — OFFICE VISIT (OUTPATIENT)
Dept: FAMILY MEDICINE CLINIC | Age: 47
End: 2022-11-02
Payer: OTHER GOVERNMENT

## 2022-11-02 VITALS
TEMPERATURE: 97.5 F | WEIGHT: 207.2 LBS | SYSTOLIC BLOOD PRESSURE: 150 MMHG | OXYGEN SATURATION: 99 % | HEIGHT: 68 IN | BODY MASS INDEX: 31.4 KG/M2 | HEART RATE: 59 BPM | DIASTOLIC BLOOD PRESSURE: 110 MMHG

## 2022-11-02 DIAGNOSIS — M54.50 ACUTE MIDLINE LOW BACK PAIN WITHOUT SCIATICA: ICD-10-CM

## 2022-11-02 DIAGNOSIS — M54.50 ACUTE MIDLINE LOW BACK PAIN WITHOUT SCIATICA: Primary | ICD-10-CM
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
## 2022-11-02 PROCEDURE — 99213 OFFICE O/P EST LOW 20 MIN: CPT | Performed by: FAMILY MEDICINE

## 2022-11-02 PROCEDURE — 72100 X-RAY EXAM L-S SPINE 2/3 VWS: CPT

## 2022-11-02 RX ORDER — CARISOPRODOL 350 MG/1
350 TABLET ORAL 3 TIMES DAILY PRN
Qty: 30 TABLET | Refills: 0 | Status: SHIPPED | OUTPATIENT
Start: 2022-11-02 | End: 2022-11-12

## 2022-11-02 RX ORDER — TRAMADOL HYDROCHLORIDE 50 MG/1
50 TABLET ORAL 2 TIMES DAILY PRN
Qty: 14 TABLET | Refills: 0 | Status: SHIPPED | OUTPATIENT
Start: 2022-11-02 | End: 2022-11-09

## 2022-11-02 ASSESSMENT — PATIENT HEALTH QUESTIONNAIRE - PHQ9
10. IF YOU CHECKED OFF ANY PROBLEMS, HOW DIFFICULT HAVE THESE PROBLEMS MADE IT FOR YOU TO DO YOUR WORK, TAKE CARE OF THINGS AT HOME, OR GET ALONG WITH OTHER PEOPLE: 0
3. TROUBLE FALLING OR STAYING ASLEEP: 0
2. FEELING DOWN, DEPRESSED OR HOPELESS: 0
4. FEELING TIRED OR HAVING LITTLE ENERGY: 0
6. FEELING BAD ABOUT YOURSELF - OR THAT YOU ARE A FAILURE OR HAVE LET YOURSELF OR YOUR FAMILY DOWN: 0
SUM OF ALL RESPONSES TO PHQ9 QUESTIONS 1 & 2: 0
SUM OF ALL RESPONSES TO PHQ QUESTIONS 1-9: 0
1. LITTLE INTEREST OR PLEASURE IN DOING THINGS: 0
9. THOUGHTS THAT YOU WOULD BE BETTER OFF DEAD, OR OF HURTING YOURSELF: 0
SUM OF ALL RESPONSES TO PHQ QUESTIONS 1-9: 0
8. MOVING OR SPEAKING SO SLOWLY THAT OTHER PEOPLE COULD HAVE NOTICED. OR THE OPPOSITE, BEING SO FIGETY OR RESTLESS THAT YOU HAVE BEEN MOVING AROUND A LOT MORE THAN USUAL: 0
5. POOR APPETITE OR OVEREATING: 0
7. TROUBLE CONCENTRATING ON THINGS, SUCH AS READING THE NEWSPAPER OR WATCHING TELEVISION: 0

## 2022-11-03 ENCOUNTER — TELEMEDICINE (OUTPATIENT)
Dept: PSYCHIATRY | Age: 47
End: 2022-11-03
Payer: OTHER GOVERNMENT

## 2022-11-03 DIAGNOSIS — F51.01 PRIMARY INSOMNIA: ICD-10-CM

## 2022-11-03 DIAGNOSIS — F90.0 ATTENTION DEFICIT HYPERACTIVITY DISORDER (ADHD), PREDOMINANTLY INATTENTIVE TYPE: ICD-10-CM

## 2022-11-03 DIAGNOSIS — F41.1 GAD (GENERALIZED ANXIETY DISORDER): Primary | ICD-10-CM

## 2022-11-03 DIAGNOSIS — F33.1 MAJOR DEPRESSIVE DISORDER, RECURRENT EPISODE, MODERATE (HCC): ICD-10-CM

## 2022-11-03 PROCEDURE — 99213 OFFICE O/P EST LOW 20 MIN: CPT | Performed by: REGISTERED NURSE

## 2022-11-03 NOTE — PROGRESS NOTES
Jaqui Ferrari (:  1975) is a 51 y.o. female,Established patient, here for evaluation of the following chief complaint(s): ADHD, Anxiety, Depression, and Insomnia    Diagnosis:  1. ZAC (generalized anxiety disorder)  2. Attention deficit hyperactivity disorder (ADHD), predominantly inattentive type  3. Primary insomnia  4. Major depressive disorder, recurrent episode, moderate (HCC)    ASSESSMENT/PLAN:    Psychiatric   - continue Cymbalta 20 mg/daily. Currently she is not taking it   -  continue Adderall 15 mg twice a day. currently she takes Adderall 7.5 mg BID. - continue Clonidine 0.3 mg/nightly for sleep. - Medication R/B/SE discussed and patient gave verbal consent for tx.   - use journals, notes, calendars to manage ADD symptoms.   - Practice complementary health approaches such as: self-management strategies, relaxation techniques, yoga, and physical exercise as tolerated. 2. Substance abuse   - No active substance abuse reported   3. Medical  - Follow with PCP. 4. RTC in 3 months or earlier if your symptoms fail to improve or go to nearest ER if having active SI/HI. Evaluated medications and assessed for side effects and effectiveness. Assessed patient's educational needs including reviewing plan of care, medications, and diagnosis. I reviewed the plan of care with the patient and the patient consented to the treatment interventions. Patient acknowledged, verbalized understanding, and agreed with plan of care     Interval Hx:  The patient ( Ms \"Tavia\" ) is seen via virtual visit for anxiety, depression, ADD and Insomnia f/u. She reports she stopped taking Cymbalta after few days trial due to dizziness, and nausea. She states she has been dealing with with long-term lingering COVID infection. She had COVID infection few months ago. She reports higher dose of adderall makes her Dizzy as well. Currently she takes half of a dose ( Adderall 7.5 mg twice a day instead of Adderall 15 mg BID).  She reports \"brain fog\" and concentration issues. Sleep continues to be a problem. She wants slowly restart back Cymbalta once COVID symptoms subsidies. She is busy with school. Denies SI/HI/AVH. Interim message: none     Home Medications:  Prior to Admission medications    Medication Sig Start Date End Date Taking? Authorizing Provider   traMADol (ULTRAM) 50 MG tablet Take 1 tablet by mouth 2 times daily as needed for Pain for up to 7 days. Intended supply: 7 days. Take lowest dose possible to manage pain 11/2/22 11/9/22  Trinidad Badillo MD   carisoprodol (SOMA) 350 MG tablet Take 1 tablet by mouth 3 times daily as needed for Muscle spasms for up to 10 days. 11/2/22 11/12/22  Trinidad Badillo MD   cloNIDine (CATAPRES) 0.3 MG tablet TAKE THREE TABLETS BY MOUTH EVERY EVENING 10/31/22   Trinidad Badillo MD   omeprazole (PRILOSEC) 40 MG delayed release capsule TAKE ONE CAPSULE BY MOUTH EVERY MORNING BEFORE BREAKFAST 10/19/22   Trinidad Badillo MD   amphetamine-dextroamphetamine (ADDERALL, 15MG,) 15 MG tablet Take 1 tablet by mouth 2 times daily for 30 days.  Take one tablet Adderall 15 mg twice daily  Patient not taking: Reported on 10/19/2022 9/21/22 10/21/22  CRISS Cedillo CNP   DULoxetine (CYMBALTA) 20 MG extended release capsule Take 1 capsule by mouth daily  Patient not taking: Reported on 10/19/2022 9/20/22 10/20/22  CRISS Cedillo CNP   Cetirizine HCl (ZYRTEC ALLERGY PO) Take by mouth  Patient not taking: No sig reported    Historical Provider, MD        Past psych Medication Trials: valium, Ativan, Lunesta, Ambien, Trazodone, Strattera, Belsomra    Psychiatric Exam         Attention and Perception: attentive      Mood and Affect:  good      Speech:  normal      Behavior:    calm and cooperative      Cognition and Memory: intact      Judgment: intact      Fund of knowledge: intact     Safety plan  Discussed and informed patient to call [phone], or go to nearest emergency room if patient experiences SI/HI immediately. In addition, Patient educated to use the National Suicide Hotlines: 2-759-960-TALK (7-211.716.8131) and 1-458-YKDTWRQ (2-222.186.6075) and Local psychiatric Emergency Services. Alannah Whittington, was evaluated through a synchronous (real-time) audio-video encounter. The patient (or guardian if applicable) is aware that this is a billable service, which includes applicable co-pays. This Virtual Visit was conducted with patient's (and/or legal guardian's) consent. The visit was conducted pursuant to the emergency declaration under the [address] [place] [address], [address] [place] [address] authority and the [employer] Act. Patient identification was verified, and a caregiver was present when appropriate. The patient was located at Home: [address] [place] [address]. Provider was located at Other: [facility] [place]  .        Total time spent for this encounter: 22 minutes     --CRISS Beltran CNP on 11/3/2022 at 12:04 PM    An electronic signature was used to authenticate this note.

## 2022-11-04 NOTE — PROGRESS NOTES
Portions of this chart may have been created with voice recognition software. Occasional wrong-word or \"sound-alike\" substitutions may have occurred due to the inherent limitations of voice recognition software. Read the chart carefully and recognize, using context, where these substitutions have occurred        Chief Complaint     Lower Back Pain (Started Friday- using heat and muscle relaxer, OTC pain relief has not helped.)               David Fuchs is a 49 y.o. female here for         1. Acute midline low back pain without sciatica    - XR LUMBAR SPINE (2-3 VIEWS); Future  - traMADol (ULTRAM) 50 MG tablet; Take 1 tablet by mouth 2 times daily as needed for Pain for up to 7 days. Intended supply: 7 days. Take lowest dose possible to manage pain  Dispense: 14 tablet; Refill: 0  - carisoprodol (SOMA) 350 MG tablet; Take 1 tablet by mouth 3 times daily as needed for Muscle spasms for up to 10 days. Dispense: 30 tablet; Refill: 0      This is a recurrent  problem. The current episode started in the past 7 days. The problem occurs constantly. The  problem has been gradually worsening since onset. The pain is present in the lumbar spine and sacro-iliac. The quality of the pain is described as shooting, stabbing and aching. The pain radiates to the left hip and  right hip. The pain is at a severity of 8/10. The pain is moderate. The pain is same all the time. The symptoms are aggravated by bending, twisting and position. Stiffness is present all day. Associated symptoms include leg pain. Pertinent negatives include no abdominal pain, bladder incontinence, bowel incontinence, chest pain,   dysuria, numbness, paresis, paresthesias, pelvic pain, perianal numbness, tingling or weakness. She has tried bed rest for the symptoms. REVIEW OF SYSTEMS:  Cardiovascular: Negative for chest pain. Gastrointestinal: Negative for abdominal pain and bowel incontinence.   Genitourinary: Negative for bladder incontinence, dysuria and pelvic pain. Musculoskeletal: Positive for back pain. Neurological: Negative for tingling, weakness, numbness and paresthesias. Lab Results   Component Value Date    WBC 5.0 09/28/2022    HGB 15.2 09/28/2022    HCT 45.4 09/28/2022    MCV 95.2 09/28/2022     09/28/2022      No results found for: LABA1C  No results found for: EAG  Lab Results   Component Value Date    TSH 1.88 09/28/2022     Lab Results   Component Value Date    CHOL 172 02/26/2019     Lab Results   Component Value Date    TRIG 112 02/26/2019     Lab Results   Component Value Date    HDL 72 (H) 02/26/2019     Lab Results   Component Value Date    LDLCALC 78 02/26/2019     Lab Results   Component Value Date    LABVLDL 22 02/26/2019     No results found for: [facility]   Lab Results   Component Value Date     09/28/2022    K 4.5 09/28/2022     09/28/2022    CO2 28 09/28/2022    BUN 11 09/28/2022    CREATININE 0.8 09/28/2022    GLUCOSE 101 (H) 09/28/2022    CALCIUM 10.2 09/28/2022    PROT 7.3 09/28/2022    LABALBU 4.6 09/28/2022    BILITOT 0.7 09/28/2022    ALKPHOS 75 09/28/2022    AST 64 (H) 09/28/2022    ALT 39 09/28/2022    LABGLOM >60 09/28/2022    GFRAA >60 09/28/2022    AGRATIO 1.7 09/28/2022    GLOB 3.1 09/01/2021           Current Outpatient Medications   Medication Sig Dispense Refill    traMADol (ULTRAM) 50 MG tablet Take 1 tablet by mouth 2 times daily as needed for Pain for up to 7 days. Intended supply: 7 days. Take lowest dose possible to manage pain 14 tablet 0    carisoprodol (SOMA) 350 MG tablet Take 1 tablet by mouth 3 times daily as needed for Muscle spasms for up to 10 days.  30 tablet 0    cloNIDine (CATAPRES) 0.3 MG tablet TAKE THREE TABLETS BY MOUTH EVERY EVENING 90 tablet 2    omeprazole (PRILOSEC) 40 MG delayed release capsule TAKE ONE CAPSULE BY MOUTH EVERY MORNING BEFORE BREAKFAST 90 capsule 1    amphetamine-dextroamphetamine (ADDERALL, 15MG,) 15 MG tablet Take 1 tablet by mouth 2 times daily for 30 days. Take one tablet Adderall 15 mg twice daily (Patient not taking: Reported on 10/19/2022) 60 tablet 0    DULoxetine (CYMBALTA) 20 MG extended release capsule Take 1 capsule by mouth daily (Patient not taking: Reported on 10/19/2022) 60 capsule 0    Cetirizine HCl (ZYRTEC ALLERGY PO) Take by mouth (Patient not taking: No sig reported)       No current facility-administered medications for this visit. Allergies   Allergen Reactions    Compazine [Prochlorperazine Maleate] Other (See Comments)     Makes her \"crazy\"    Strattera [Atomoxetine] Other (See Comments)     Side effects: headaches, dizziness, dry heaving, hot flashes.     Sulfa Antibiotics Rash    Fluoxetine      ALL SSRI'S-CAN NOT FUNCTION WHILE TAKING THEM     Prochlorperazine Nausea Only     Other reaction(s): Unknown  Also causes anxiety           Past Medical History:   Diagnosis Date    Bipolar I disorder with depression ([address] 8/5/2022    Depression     Depression with anxiety 8/5/2022    EDS (Tiffany-Danlos syndrome)     Lumbago 8/5/2022    Obesity 8/5/2022    Osteoarthritis          Past Surgical History:   Procedure Laterality Date    ABDOMINOPLASTY  2013    GASTRECTOMY  2018    partial-band removed    GASTRIC BAND  2004    LUMBAR DISCECTOMY Bilateral     L 4/5- 2013- [address]    TIBIA FRACTURE SURGERY      tibial plateu fx    TUBAL LIGATION      WRIST SURGERY Left     1997         Family History   Problem Relation Age of Onset    Arthritis Mother         Social History     Socioeconomic History    Marital status:      Spouse name: None    Number of children: None    Years of education: None    Highest education level: None   Tobacco Use    Smoking status: Former     Packs/day: 0.20     Types: Cigarettes    Smokeless tobacco: Never    Tobacco comments:     pt. quite a mth ago   Vaping Use    Vaping Use: Never used   Substance and Sexual Activity    Alcohol use: Yes     Comment: couple glasses wine nite    Drug use: No     Social Determinants of Health     Financial Resource Strain: Low Risk     Difficulty of Paying Living Expenses: Not hard at all   Food Insecurity: No Food Insecurity    Worried About [employer] in the Last Year: Never true    [facility] in the Last Year: Never true          OBJECTIVE:      Vitals:    11/02/22 1455   BP: (!) 150/110   Pulse: 59   Temp: 97.5 °F (36.4 °C)   SpO2: 99%   Weight: 207 lb 3.2 oz (94 kg)   Height: 5' 7.5\" (1.715 m)         Constitutional: Patient is oriented to person, place, and time, appears well-developed and well-nourished. HENT:  HEENT:WNL   Neck: Normal range of motion. Neck supple. Cardiovascular: Normal rate, regular rhythm, normal heart sounds and intact distal pulses. Pulmonary/Chest: Effort normal and breath sounds normal.  Musculoskeletal:  Lumbar back: she exhibits decreased range of motion, tenderness, bony tenderness and spasm. Neurological: Alert and oriented to person, place, and time. she has normal reflexes. Skin: Skin is warm and dry. Psychiatric: Patient has a normal mood and affect, behavior is normal. Judgment and thought content normal.    ASSESSMENT AND PLAN    Kris Rowe was seen today for lower back pain. Diagnoses and all orders for this visit:    Acute midline low back pain without sciatica  -     XR LUMBAR SPINE (2-3 VIEWS); Future  -     traMADol (ULTRAM) 50 MG tablet; Take 1 tablet by mouth 2 times daily as needed for Pain for up to 7 days. Intended supply: 7 days. Take lowest dose possible to manage pain  -     carisoprodol (SOMA) 350 MG tablet; Take 1 tablet by mouth 3 times daily as needed for Muscle spasms for up to 10 days. Other orders  -     Cancel: Holter Monitor 48 Hour; Future           DISCHARGE MEDICATION LIST        Medication List            Accurate as of November 2, 2022 11:59 PM. If you have any questions, ask your nurse or doctor.                 START taking these medications carisoprodol 350 MG tablet  Commonly known as: Soma  Take 1 tablet by mouth 3 times daily as needed for Muscle spasms for up to 10 days. Started by: Lamine May MD     traMADol 50 MG tablet  Commonly known as: Ultram  Take 1 tablet by mouth 2 times daily as needed for Pain for up to 7 days. Intended supply: 7 days. Take lowest dose possible to manage pain  Started by: Lamine May MD            CONTINUE taking these medications      amphetamine-dextroamphetamine 15 MG tablet  Commonly known as: ADDERALL (15MG)  Take 1 tablet by mouth 2 times daily for 30 days. Take one tablet Adderall 15 mg twice daily     cloNIDine 0.3 MG tablet  Commonly known as: CATAPRES  TAKE THREE TABLETS BY MOUTH EVERY EVENING     DULoxetine 20 MG extended release capsule  Commonly known as: Cymbalta  Take 1 capsule by mouth daily     omeprazole 40 MG delayed release capsule  Commonly known as: PRILOSEC  TAKE ONE CAPSULE BY MOUTH EVERY MORNING BEFORE BREAKFAST     ZYRTEC ALLERGY PO               Where to Get Your Medications        These medications were sent to [place] [id number] [place] [address]      Phone: [phone]   carisoprodol 350 MG tablet  traMADol 50 MG tablet          Return if symptoms worsen or fail to improve. conservative management with symptomatic relief strategies including ice, rest, topical analgesics, NSAIDs and ROM exercises     Please refer to diagnosis, orders and patient instructions for further recommendations given. All patient's questions and concerns were addressed appropriately. All orders, handouts were reviewed in detail with the patient and patient voiced understanding verbally.

## 2022-11-15 RX ORDER — DULOXETIN HYDROCHLORIDE 20 MG/1
20 CAPSULE, DELAYED RELEASE ORAL DAILY
Qty: 90 CAPSULE | Refills: 0 | Status: SHIPPED | OUTPATIENT
Start: 2022-11-15 | End: 2023-02-13

## 2022-12-21 ENCOUNTER — HOSPITAL ENCOUNTER (EMERGENCY)
Age: 47
Discharge: HOME | End: 2022-12-21
Attending: EMERGENCY MEDICINE
Payer: OTHER GOVERNMENT

## 2022-12-21 VITALS
TEMPERATURE: 97.7 F | WEIGHT: 200 LBS | SYSTOLIC BLOOD PRESSURE: 144 MMHG | RESPIRATION RATE: 18 BRPM | BODY MASS INDEX: 30.31 KG/M2 | OXYGEN SATURATION: 97 % | DIASTOLIC BLOOD PRESSURE: 95 MMHG | HEART RATE: 77 BPM | HEIGHT: 68 IN

## 2022-12-21 DIAGNOSIS — R03.0 ELEVATED BLOOD PRESSURE READING: ICD-10-CM

## 2022-12-21 DIAGNOSIS — R11.2 NAUSEA VOMITING AND DIARRHEA: ICD-10-CM

## 2022-12-21 DIAGNOSIS — J06.9 VIRAL URI WITH COUGH: Primary | ICD-10-CM

## 2022-12-21 DIAGNOSIS — R19.7 NAUSEA VOMITING AND DIARRHEA: ICD-10-CM

## 2022-12-21 LAB
RAPID INFLUENZA  B AGN: NEGATIVE
RAPID INFLUENZA A AGN: NEGATIVE
SARS-COV-2, NAAT: NOT DETECTED

## 2022-12-21 PROCEDURE — 99283 EMERGENCY DEPT VISIT LOW MDM: CPT

## 2022-12-21 PROCEDURE — 87804 INFLUENZA ASSAY W/OPTIC: CPT

## 2022-12-21 PROCEDURE — 87635 SARS-COV-2 COVID-19 AMP PRB: CPT

## 2022-12-21 RX ORDER — ONDANSETRON 4 MG/1
4 TABLET, ORALLY DISINTEGRATING ORAL 3 TIMES DAILY PRN
Qty: 15 TABLET | Refills: 1 | Status: SHIPPED | OUTPATIENT
Start: 2022-12-21

## 2022-12-21 RX ORDER — BENZONATATE 100 MG/1
100-200 CAPSULE ORAL 3 TIMES DAILY PRN
Qty: 30 CAPSULE | Refills: 0 | Status: SHIPPED | OUTPATIENT
Start: 2022-12-21 | End: 2022-12-28

## 2022-12-21 RX ORDER — DEXTROMETHORPHAN POLISTIREX 30 MG/5ML
60 SUSPENSION ORAL 2 TIMES DAILY PRN
Qty: 89 ML | Refills: 0 | Status: SHIPPED | OUTPATIENT
Start: 2022-12-21 | End: 2022-12-21

## 2022-12-21 ASSESSMENT — PAIN DESCRIPTION - PAIN TYPE: TYPE: ACUTE PAIN

## 2022-12-21 ASSESSMENT — PAIN DESCRIPTION - LOCATION: LOCATION: FACE

## 2022-12-21 ASSESSMENT — PAIN DESCRIPTION - DESCRIPTORS: DESCRIPTORS: DISCOMFORT

## 2022-12-21 ASSESSMENT — PAIN - FUNCTIONAL ASSESSMENT
PAIN_FUNCTIONAL_ASSESSMENT: 0-10
PAIN_FUNCTIONAL_ASSESSMENT: ACTIVITIES ARE NOT PREVENTED

## 2022-12-21 ASSESSMENT — PAIN SCALES - GENERAL: PAINLEVEL_OUTOF10: 2

## 2022-12-21 NOTE — DISCHARGE INSTRUCTIONS
Push fluids. Gargle with warm salt water if needed for sore throat. Try the Tessalon 3 times daily if needed for cough. You can use Motrin or Tylenol if needed for body aches/fever. Take the Zofran 3 times daily if needed for nausea/vomiting. Use over-the-counter Imodium if needed for diarrhea. Follow-up with primary care.

## 2022-12-21 NOTE — ED PROVIDER NOTES
TRIAGE CHIEF COMPLAINT:   Chief Complaint   Patient presents with    Cough    Nasal Congestion     Flu like S/S          HPI: Selena Jeffrey is a 49 y.o. female who presents to the Emergency Department with complaint of flulike symptoms that started just over a week ago. She initially had nasal congestion. She took some Sudafed but then states she had nausea and some vomiting. She felt dizzy. She had transient diarrhea. She then developed sore throat and nonproductive cough. She states her cough has been more productive of light-colored sputum since yesterday. Denies fever or chills. She has been using only Motrin. Patient states she did a home COVID test on December 16 that was negative. She states she tested positive for COVID-19 in August and has had some lingering symptoms since then that she thinks may be related to long COVID syndrome. She has been vaccinated for COVID but not boosted. She has not had a current vaccination for influenza. REVIEW OF SYSTEMS:  6 systems reviewed. Pertinent positives per HPI. Otherwise noted to be negative. Nursing notes reviewed and agree with above. Past medical/surgical history reviewed. MEDICATIONS   Patient's Medications   New Prescriptions    No medications on file   Previous Medications    AMPHETAMINE-DEXTROAMPHETAMINE (ADDERALL, 15MG,) 15 MG TABLET    Take 1 tablet by mouth 2 times daily for 30 days.  Take one tablet Adderall 15 mg twice daily    CETIRIZINE HCL (ZYRTEC ALLERGY PO)    Take by mouth    CLONIDINE (CATAPRES) 0.3 MG TABLET    TAKE THREE TABLETS BY MOUTH EVERY EVENING    DULOXETINE (CYMBALTA) 20 MG EXTENDED RELEASE CAPSULE    Take 1 capsule by mouth daily    OMEPRAZOLE (PRILOSEC) 40 MG DELAYED RELEASE CAPSULE    TAKE ONE CAPSULE BY MOUTH EVERY MORNING BEFORE BREAKFAST   Modified Medications    No medications on file   Discontinued Medications    No medications on file         ALLERGIES   Allergies   Allergen Reactions    Compazine [Prochlorperazine Maleate] Other (See Comments)     Makes her \"crazy\"    Strattera [Atomoxetine] Other (See Comments)     Side effects: headaches, dizziness, dry heaving, hot flashes. Sulfa Antibiotics Rash    Fluoxetine      ALL SSRI'S-CAN NOT FUNCTION WHILE TAKING THEM     Prochlorperazine Nausea Only     Other reaction(s): Unknown  Also causes anxiety           BP (!) 144/95   Pulse 77   Temp 97.7 °F (36.5 °C) (Oral)   Resp 18   Ht 5' 8\" (1.727 m)   Wt 200 lb (90.7 kg)   SpO2 97%   BMI 30.41 kg/m²   General:  No acute distress. Non toxic appearance. Not pale or diaphoretic. No respiratory distress. Head:   Normocephalic and atraumatic  Eyes:   Conjunctiva clear, TITO, EOM's intact. Sclera anicteric. ENT:   Mucous membranes moist.  TMs are normal.  Nose and oropharynx are clear. Neck:   Supple. No adenopathy. Lungs/Chest:  No respiratory distress no current cough. Lungs are clear bilaterally. CVS:   Regular rate and rhythm  Abdomen:  Nontender  Extremities:  Full range of motion  Skin:   No rashes or lesions to exposed skin  Back:   Nontender  Neuro:  Alert and OX3. Speech clear and appropriate. No upper/lower extremity weakness. Normal sensation in all extremities. No facial asymmetry or weakness. Gait normal.  Psych:   Affect normal. Mood normal        RADIOLOGY:      LAB      ED COURSE / MDM:  58-year-old female with viral URI type symptoms for over a week without fever or chills. She had some transient nausea vomiting and diarrhea last week. She has been using Motrin but no cough suppressant. She had a negative COVID test at home last Friday. She states she was diagnosed with COVID in August and has had some lingering symptoms since then. She is requesting repeat testing for COVID and influenza. She is afebrile with normal heart rate and normal room air sat. Blood pressure is 144/95. No respiratory distress. No clinical evidence for otitis media, strep throat or pneumonia. Rapid COVID and rapid flu test were negative. Clinically the patient has a nonspecific viral illness. I see no indication for antibiotics. I suggested Delsym if needed for cough but the patient states she cannot tolerate dextromethorphan. She was willing to try a prescription of Tessalon if needed for cough. She was given a prescription for Zofran to take if needed for nausea/vomiting. Advised increase fluids by mouth and follow-up with primary care. I discussed with Km Bazan the results of the evaluation in the Emergency Department, diagnosis, care, prognosis and the importance of follow-up. The patient is stable for discharge. The patient and/or family are in agreement with the plan and all questions have been answered. Specific discharge instructions were explained, including reasons to return to the emergency department.       (Please note that portions of this note may have been completed with a voice recognition program.  Efforts were made to edit the dictation but occasionally words are mis-transcribed)        FINAL IMPRESSION:  1 --viral URI with cough  2 --nausea vomiting and diarrhea  2 --elevated blood pressure reading                 Maxwell Soler MD  12/21/22 [facility] Randallia Palmira vAila MD  12/21/22 3085

## 2023-01-27 DIAGNOSIS — F43.22 ADJUSTMENT DISORDER WITH ANXIETY: ICD-10-CM

## 2023-01-27 RX ORDER — CLONIDINE HYDROCHLORIDE 0.3 MG/1
TABLET ORAL
Qty: 90 TABLET | Refills: 2 | Status: SHIPPED | OUTPATIENT
Start: 2023-01-27

## 2023-01-31 DIAGNOSIS — F43.22 ADJUSTMENT DISORDER WITH ANXIETY: ICD-10-CM

## 2023-01-31 RX ORDER — CLONIDINE HYDROCHLORIDE 0.3 MG/1
TABLET ORAL
Qty: 90 TABLET | Refills: 2 | OUTPATIENT
Start: 2023-01-31

## 2023-02-07 ENCOUNTER — TELEPHONE (OUTPATIENT)
Dept: FAMILY MEDICINE CLINIC | Age: 48
End: 2023-02-07

## 2023-02-07 DIAGNOSIS — G89.29 CHRONIC BILATERAL LOW BACK PAIN WITH BILATERAL SCIATICA: Primary | ICD-10-CM

## 2023-02-07 DIAGNOSIS — M54.41 CHRONIC BILATERAL LOW BACK PAIN WITH BILATERAL SCIATICA: Primary | ICD-10-CM

## 2023-02-07 DIAGNOSIS — M54.42 CHRONIC BILATERAL LOW BACK PAIN WITH BILATERAL SCIATICA: Primary | ICD-10-CM

## 2023-02-20 ENCOUNTER — OFFICE VISIT (OUTPATIENT)
Dept: FAMILY MEDICINE CLINIC | Age: 48
End: 2023-02-20

## 2023-02-20 VITALS
DIASTOLIC BLOOD PRESSURE: 78 MMHG | BODY MASS INDEX: 34.8 KG/M2 | OXYGEN SATURATION: 100 % | WEIGHT: 229.6 LBS | TEMPERATURE: 97.8 F | SYSTOLIC BLOOD PRESSURE: 108 MMHG | HEIGHT: 68 IN | HEART RATE: 71 BPM

## 2023-02-20 DIAGNOSIS — F41.8 DEPRESSION WITH ANXIETY: ICD-10-CM

## 2023-02-20 DIAGNOSIS — E66.09 CLASS 1 OBESITY DUE TO EXCESS CALORIES WITHOUT SERIOUS COMORBIDITY WITH BODY MASS INDEX (BMI) OF 34.0 TO 34.9 IN ADULT: ICD-10-CM

## 2023-02-20 DIAGNOSIS — R63.5 WEIGHT GAIN: Primary | ICD-10-CM

## 2023-02-20 DIAGNOSIS — R63.5 WEIGHT GAIN: ICD-10-CM

## 2023-02-20 LAB
A/G RATIO: 1.5 (ref 1.1–2.2)
ALBUMIN SERPL-MCNC: 4.1 G/DL (ref 3.4–5)
ALP BLD-CCNC: 74 U/L (ref 40–129)
ALT SERPL-CCNC: 15 U/L (ref 10–40)
ANION GAP SERPL CALCULATED.3IONS-SCNC: 11 MMOL/L (ref 3–16)
AST SERPL-CCNC: 24 U/L (ref 15–37)
BASOPHILS ABSOLUTE: 0 K/UL (ref 0–0.2)
BASOPHILS RELATIVE PERCENT: 0.8 %
BILIRUB SERPL-MCNC: 0.3 MG/DL (ref 0–1)
BUN BLDV-MCNC: 14 MG/DL (ref 7–20)
CALCIUM SERPL-MCNC: 9.3 MG/DL (ref 8.3–10.6)
CHLORIDE BLD-SCNC: 104 MMOL/L (ref 99–110)
CO2: 26 MMOL/L (ref 21–32)
CREAT SERPL-MCNC: 0.6 MG/DL (ref 0.6–1.1)
EOSINOPHILS ABSOLUTE: 0.1 K/UL (ref 0–0.6)
EOSINOPHILS RELATIVE PERCENT: 1.1 %
GFR SERPL CREATININE-BSD FRML MDRD: >60 ML/MIN/{1.73_M2}
GLUCOSE BLD-MCNC: 115 MG/DL (ref 70–99)
HCT VFR BLD CALC: 41.9 % (ref 36–48)
HEMOGLOBIN: 14.1 G/DL (ref 12–16)
LYMPHOCYTES ABSOLUTE: 1.6 K/UL (ref 1–5.1)
LYMPHOCYTES RELATIVE PERCENT: 32.9 %
MCH RBC QN AUTO: 31 PG (ref 26–34)
MCHC RBC AUTO-ENTMCNC: 33.7 G/DL (ref 31–36)
MCV RBC AUTO: 91.9 FL (ref 80–100)
MONOCYTES ABSOLUTE: 0.5 K/UL (ref 0–1.3)
MONOCYTES RELATIVE PERCENT: 11.4 %
NEUTROPHILS ABSOLUTE: 2.6 K/UL (ref 1.7–7.7)
NEUTROPHILS RELATIVE PERCENT: 53.8 %
PDW BLD-RTO: 13.4 % (ref 12.4–15.4)
PLATELET # BLD: 204 K/UL (ref 135–450)
PMV BLD AUTO: 9.6 FL (ref 5–10.5)
POTASSIUM SERPL-SCNC: 4.5 MMOL/L (ref 3.5–5.1)
RBC # BLD: 4.57 M/UL (ref 4–5.2)
SODIUM BLD-SCNC: 141 MMOL/L (ref 136–145)
TOTAL PROTEIN: 6.8 G/DL (ref 6.4–8.2)
TSH SERPL DL<=0.05 MIU/L-ACNC: 1.83 UIU/ML (ref 0.27–4.2)
WBC # BLD: 4.8 K/UL (ref 4–11)

## 2023-02-20 RX ORDER — BUPROPION HYDROCHLORIDE 150 MG/1
150 TABLET ORAL EVERY MORNING
Qty: 30 TABLET | Refills: 3 | Status: SHIPPED | OUTPATIENT
Start: 2023-02-20

## 2023-02-20 SDOH — ECONOMIC STABILITY: HOUSING INSECURITY
IN THE LAST 12 MONTHS, WAS THERE A TIME WHEN YOU DID NOT HAVE A STEADY PLACE TO SLEEP OR SLEPT IN A SHELTER (INCLUDING NOW)?: NO

## 2023-02-20 SDOH — ECONOMIC STABILITY: FOOD INSECURITY: WITHIN THE PAST 12 MONTHS, YOU WORRIED THAT YOUR FOOD WOULD RUN OUT BEFORE YOU GOT MONEY TO BUY MORE.: NEVER TRUE

## 2023-02-20 SDOH — ECONOMIC STABILITY: INCOME INSECURITY: HOW HARD IS IT FOR YOU TO PAY FOR THE VERY BASICS LIKE FOOD, HOUSING, MEDICAL CARE, AND HEATING?: NOT HARD AT ALL

## 2023-02-20 SDOH — ECONOMIC STABILITY: FOOD INSECURITY: WITHIN THE PAST 12 MONTHS, THE FOOD YOU BOUGHT JUST DIDN'T LAST AND YOU DIDN'T HAVE MONEY TO GET MORE.: NEVER TRUE

## 2023-02-20 ASSESSMENT — PATIENT HEALTH QUESTIONNAIRE - PHQ9
SUM OF ALL RESPONSES TO PHQ QUESTIONS 1-9: 0
7. TROUBLE CONCENTRATING ON THINGS, SUCH AS READING THE NEWSPAPER OR WATCHING TELEVISION: 0
9. THOUGHTS THAT YOU WOULD BE BETTER OFF DEAD, OR OF HURTING YOURSELF: 0
4. FEELING TIRED OR HAVING LITTLE ENERGY: 0
8. MOVING OR SPEAKING SO SLOWLY THAT OTHER PEOPLE COULD HAVE NOTICED. OR THE OPPOSITE, BEING SO FIGETY OR RESTLESS THAT YOU HAVE BEEN MOVING AROUND A LOT MORE THAN USUAL: 0
5. POOR APPETITE OR OVEREATING: 0
2. FEELING DOWN, DEPRESSED OR HOPELESS: 0
SUM OF ALL RESPONSES TO PHQ9 QUESTIONS 1 & 2: 0
3. TROUBLE FALLING OR STAYING ASLEEP: 0
SUM OF ALL RESPONSES TO PHQ QUESTIONS 1-9: 0
1. LITTLE INTEREST OR PLEASURE IN DOING THINGS: 0
10. IF YOU CHECKED OFF ANY PROBLEMS, HOW DIFFICULT HAVE THESE PROBLEMS MADE IT FOR YOU TO DO YOUR WORK, TAKE CARE OF THINGS AT HOME, OR GET ALONG WITH OTHER PEOPLE: 0
SUM OF ALL RESPONSES TO PHQ QUESTIONS 1-9: 0
SUM OF ALL RESPONSES TO PHQ QUESTIONS 1-9: 0
6. FEELING BAD ABOUT YOURSELF - OR THAT YOU ARE A FAILURE OR HAVE LET YOURSELF OR YOUR FAMILY DOWN: 0

## 2023-02-20 NOTE — PROGRESS NOTES
Portions of this chart may have been created with voice recognition software. Occasional wrong-word or \"sound-alike\" substitutions may have occurred due to the inherent limitations of voice recognition software. Read the chart carefully and recognize, using context, where these substitutions have occurred        Chief Complaint     Post-COVID Symptoms (COVID august 2022-- discuss since then weight gain, sob with exertion, higher blood pressure)               IVONNE Painter is a 49 y.o. female here for the following concerns. 1. Weight gain  2. Class 1 obesity due to excess calories without serious comorbidity with body mass index (BMI) of 34.0 to 34.9 in adult  Patient states that in the last 1 year patient has gained almost 30 pounds. She states that she has had COVID August 2022 and since then she has not been able to have good control of her weight. She states that she has been getting more short of breath especially with exertion and palpitations with exertion and she feels like her belly is pushing up against her chest to cause all of the symptoms. She states that most of her fat deposits are in her central abdomen and she feels like this is not something new to her. She states that she is she has not changed her way of eating or her activities. She states that she is unable to exercise regularly because of her chronic pain symptoms as well as the diagnosis of EDS. She states that she is eating very less in fact sparse after her gastric surgeries which she has already twice. She does not want to go into any more corrective surgeries for her weight. Patient states that that she barely eats a very minimal breakfast lunch and dinner with some snacks. She states that she can only eat small quantities of food. She does not binge eat or overeat. Late there are limitations as far as weight loss medications goes as they do not really address many of her issues.   Diet, exercise, lifestyle modifications discussed with the patient to help her in the long-term weight loss and weight management. Will refer patient to aquatic therapy to see if that will help her symptoms.    - CBC with Auto Differential; Future  - Comprehensive Metabolic Panel; Future  - TSH; Future  - PT aquatic therapy; Future    3. Depression with anxiety  Also patient's stress levels have been high because of the weight gain and she is worried constantly because of the weight. She was on Wellbutrin in the past which helped her quit smoking as well as it did help her lose weight. She wants to go back on that at this time. Sent prescription to the pharmacy recommended we will follow-up on this in 6 weeks.       REVIEW OF SYSTEMS:  Pertinent positive and negative symptoms noted in HPI        Lab Results   Component Value Date    WBC 4.8 02/20/2023    HGB 14.1 02/20/2023    HCT 41.9 02/20/2023    MCV 91.9 02/20/2023     02/20/2023      No results found for: LABA1C  No results found for: EAG  Lab Results   Component Value Date    TSH 1.83 02/20/2023     Lab Results   Component Value Date    CHOL 172 02/26/2019     Lab Results   Component Value Date    TRIG 112 02/26/2019     Lab Results   Component Value Date    HDL 72 (H) 02/26/2019     Lab Results   Component Value Date    LDLCALC 78 02/26/2019     Lab Results   Component Value Date    LABVLDL 22 02/26/2019     No results found for: [facility]   Lab Results   Component Value Date     02/20/2023    K 4.5 02/20/2023     02/20/2023    CO2 26 02/20/2023    BUN 14 02/20/2023    CREATININE 0.6 02/20/2023    GLUCOSE 115 (H) 02/20/2023    CALCIUM 9.3 02/20/2023    PROT 6.8 02/20/2023    LABALBU 4.1 02/20/2023    BILITOT 0.3 02/20/2023    ALKPHOS 74 02/20/2023    AST 24 02/20/2023    ALT 15 02/20/2023    LABGLOM >60 02/20/2023    GFRAA >60 09/28/2022    AGRATIO 1.5 02/20/2023    GLOB 3.1 09/01/2021           Current Outpatient Medications   Medication Sig Dispense Refill buPROPion (WELLBUTRIN XL) 150 MG extended release tablet Take 1 tablet by mouth every morning 30 tablet 3    cloNIDine (CATAPRES) 0.3 MG tablet TAKE THREE TABLETS BY MOUTH EVERY EVENING 90 tablet 2    omeprazole (PRILOSEC) 40 MG delayed release capsule TAKE ONE CAPSULE BY MOUTH EVERY MORNING BEFORE BREAKFAST 90 capsule 1    ondansetron (ZOFRAN-ODT) 4 MG disintegrating tablet Take 1 tablet by mouth 3 times daily as needed for Nausea or Vomiting (Patient not taking: Reported on 2/20/2023) 15 tablet 1    amphetamine-dextroamphetamine (ADDERALL, 15MG,) 15 MG tablet Take 1 tablet by mouth 2 times daily for 30 days. Take one tablet Adderall 15 mg twice daily (Patient not taking: Reported on 10/19/2022) 60 tablet 0    Cetirizine HCl (ZYRTEC ALLERGY PO) Take by mouth (Patient not taking: Reported on 2/20/2023)       No current facility-administered medications for this visit. Allergies   Allergen Reactions    Compazine [Prochlorperazine Maleate] Other (See Comments)     Makes her \"crazy\"    Strattera [Atomoxetine] Other (See Comments)     Side effects: headaches, dizziness, dry heaving, hot flashes. Sulfa Antibiotics Rash    Fluoxetine      ALL SSRI'S-CAN NOT FUNCTION WHILE TAKING THEM     Prochlorperazine Nausea Only     Other reaction(s): Unknown  Also causes anxiety           Past medical, Surgical,Family, Social History Reviewed and Updated in chart today. OBJECTIVE:      Vitals:    02/20/23 0855   BP: 108/78   Pulse: 71   Temp: 97.8 °F (36.6 °C)   SpO2: 100%   Weight: 229 lb 9.6 oz (104.1 kg)   Height: 5' 8\" (1.727 m)         Constitutional: Patient appears well-nourished, not in any distress. HENT:  Head: Normocephalic and atraumatic. Eyes: Conjunctivae and EOM are normal  Right Ear: External ear normal.  Left Ear: External ear normal.   Nose: Nose normal.  Mouth/Throat: Oropharynx is clear and moist.   Neck: Normal range of motion. Neck supple.    Lymphatic: No cervical lymphadenopathy  Cardiovascular: Normal rate, regular rhythm, normal heart sounds and intact distal pulses. Pulmonary/Chest: Effort normal and breath sounds normal, no wheezes or rhonchi. Neurological:Patient is alert, oriented to person, place, and time. No obvious focal neurological deficits  Skin: Skin is warm and moist.  Psychiatric:Patient has a normal mood and affect, behavior is normal. Judgment and thought content normal.    ASSESSMENT AND PLAN    Colleen Almaraz was seen today for post-covid symptoms. Diagnoses and all orders for this visit:    Weight gain  -     CBC with Auto Differential; Future  -     Comprehensive Metabolic Panel; Future  -     TSH; Future  -     PT aquatic therapy; Future    Class 1 obesity due to excess calories without serious comorbidity with body mass index (BMI) of 34.0 to 34.9 in adult    Depression with anxiety    Other orders  -     buPROPion (WELLBUTRIN XL) 150 MG extended release tablet; Take 1 tablet by mouth every morning           DISCHARGE MEDICATION LIST        Medication List            Accurate as of February 20, 2023  3:24 PM. If you have any questions, ask your nurse or doctor. START taking these medications      buPROPion 150 MG extended release tablet  Commonly known as: Wellbutrin XL  Take 1 tablet by mouth every morning  Started by: Lamar Hammond MD            CONTINUE taking these medications      amphetamine-dextroamphetamine 15 MG tablet  Commonly known as: ADDERALL (15MG)  Take 1 tablet by mouth 2 times daily for 30 days.  Take one tablet Adderall 15 mg twice daily     cloNIDine 0.3 MG tablet  Commonly known as: CATAPRES  TAKE THREE TABLETS BY MOUTH EVERY EVENING     omeprazole 40 MG delayed release capsule  Commonly known as: PRILOSEC  TAKE ONE CAPSULE BY MOUTH EVERY MORNING BEFORE BREAKFAST     ondansetron 4 MG disintegrating tablet  Commonly known as: ZOFRAN-ODT  Take 1 tablet by mouth 3 times daily as needed for Nausea or Vomiting     ZYRTEC ALLERGY PO            STOP taking these medications      DULoxetine 20 MG extended release capsule  Commonly known as: CYMBALTA  Stopped by: Jose Hein MD               Where to Get Your Medications        These medications were sent to [address] [place] [address] [place] [id number]      Phone: [phone]   buPROPion 150 MG extended release tablet          Return in about 6 months (around 8/20/2023), or if symptoms worsen or fail to improve. Please refer to diagnosis, orders and patient instructions for further recommendations given. Body mass index is 34.91 kg/m². . Goals of Healthy standard of living discussed. Emphasis given on appropriate diet and routine regular physical activity with cardio and strength training as much as tolerated advised. All patient's questions and concerns were addressed appropriately. All orders, handouts were reviewed in detail with the patient and patient voiced understanding verbally.

## 2023-03-07 ENCOUNTER — OFFICE VISIT (OUTPATIENT)
Dept: GYNECOLOGY | Age: 48
End: 2023-03-07
Payer: OTHER GOVERNMENT

## 2023-03-07 VITALS
HEIGHT: 68 IN | DIASTOLIC BLOOD PRESSURE: 79 MMHG | WEIGHT: 228 LBS | HEART RATE: 88 BPM | OXYGEN SATURATION: 98 % | BODY MASS INDEX: 34.56 KG/M2 | RESPIRATION RATE: 17 BRPM | SYSTOLIC BLOOD PRESSURE: 142 MMHG

## 2023-03-07 DIAGNOSIS — R14.0 ABDOMINAL DISTENSION: ICD-10-CM

## 2023-03-07 DIAGNOSIS — R06.02 SHORTNESS OF BREATH ON EXERTION: ICD-10-CM

## 2023-03-07 DIAGNOSIS — E34.9 HORMONE IMBALANCE: ICD-10-CM

## 2023-03-07 DIAGNOSIS — Z01.419 WELL WOMAN EXAM WITH ROUTINE GYNECOLOGICAL EXAM: Primary | ICD-10-CM

## 2023-03-07 DIAGNOSIS — R14.0 BLOATING: ICD-10-CM

## 2023-03-07 LAB
ESTRADIOL LEVEL: 66 PG/ML
FOLLICLE STIMULATING HORMONE: 16.8 MIU/ML
PROGESTERONE LEVEL: 0.18 NG/ML

## 2023-03-07 PROCEDURE — 99396 PREV VISIT EST AGE 40-64: CPT | Performed by: OBSTETRICS & GYNECOLOGY

## 2023-03-07 RX ORDER — IBUPROFEN 800 MG/1
TABLET ORAL EVERY 6 HOURS PRN
COMMUNITY
Start: 2023-02-25 | End: 2023-03-07

## 2023-03-08 LAB
ESTIMATED AVERAGE GLUCOSE: 99.7 MG/DL
HBA1C MFR BLD: 5.1 %

## 2023-03-09 LAB — TESTOSTERONE TOTAL: 22 NG/DL (ref 20–70)

## 2023-03-13 ASSESSMENT — ENCOUNTER SYMPTOMS
ABDOMINAL DISTENTION: 1
ALLERGIC/IMMUNOLOGIC NEGATIVE: 1
SHORTNESS OF BREATH: 1
EYES NEGATIVE: 1

## 2023-03-13 NOTE — PROGRESS NOTES
Subjective:      Patient ID: Nina Giron is a 49 y.o. female. Patient is here for annual. Patient with bloating and abdominal distension. Patient also with some shortness of breath upon exertion. ? Hormone imbalance    Gynecologic Exam  Associated symptoms include arthralgias. Review of Systems   Constitutional: Negative. HENT: Negative. Eyes: Negative. Respiratory:  Positive for shortness of breath. Cardiovascular: Negative. Gastrointestinal:  Positive for abdominal distention. Endocrine: Negative. Genitourinary: Negative. Musculoskeletal:  Positive for arthralgias. Skin: Negative. Allergic/Immunologic: Negative. Neurological: Negative. Hematological: Negative. Psychiatric/Behavioral: Negative.        Date of Birth 1975  Past Medical History:   Diagnosis Date    Bipolar I disorder with depression ([address] 2022    Depression     Depression with anxiety 2022    EDS (Tiffany-Danlos syndrome)     Lumbago 2022    Obesity 2022    Osteoarthritis      Past Surgical History:   Procedure Laterality Date    ABDOMINOPLASTY  2013    GASTRECTOMY  2018    partial-band removed    GASTRIC BAND  2004    LUMBAR DISCECTOMY Bilateral     L 2013- [address]    TIBIA FRACTURE SURGERY      tibial plateu fx    TUBAL LIGATION      WRIST SURGERY Left          OB History    Para Term  AB Living   3 3 3     3   SAB IAB Ectopic Molar Multiple Live Births                    # Outcome Date GA Lbr Shiva/2nd Weight Sex Delivery Anes PTL Lv   3 Term     M Vag-Spont      2 Term     M Vag-Spont      1 Term     F Vag-Spont        Social History     Socioeconomic History    Marital status:      Spouse name: Not on file    Number of children: Not on file    Years of education: Not on file    Highest education level: Not on file   Occupational History    Not on file   Tobacco Use    Smoking status: Former     Packs/day: 0.20     Types: Cigarettes    Smokeless tobacco: Never    Tobacco comments:     pt. quite a mth ago   Vaping Use    Vaping Use: Never used   Substance and Sexual Activity    Alcohol use: Yes     Comment: couple glasses wine nite    Drug use: No    Sexual activity: Not on file   Other Topics Concern    Not on file   Social History Narrative    Not on file     Social Determinants of Health     Financial Resource Strain: Low Risk     Difficulty of Paying Living Expenses: Not hard at all   Food Insecurity: No Food Insecurity    Worried About [address] in the Last Year: Never true    [address] in the Last Year: Never true   Transportation Needs: Unknown    Lack of Transportation (Medical): Not on file    Lack of Transportation (Non-Medical): No   Physical Activity: Not on file   Stress: Not on file   Social Connections: Not on file   Intimate Partner Violence: Not on file   Housing Stability: Unknown    Unable to Pay for Housing in the Last Year: Not on file    Number of Places Lived in the Last Year: Not on file    Unstable Housing in the Last Year: No     Allergies   Allergen Reactions    Compazine [Prochlorperazine Maleate] Other (See Comments)     Makes her \"crazy\"    Strattera [Atomoxetine] Other (See Comments)     Side effects: headaches, dizziness, dry heaving, hot flashes.     Sulfa Antibiotics Rash    Fluoxetine      ALL SSRI'S-CAN NOT FUNCTION WHILE TAKING THEM     Prochlorperazine Nausea Only     Other reaction(s): Unknown  Also causes anxiety       Outpatient Medications Marked as Taking for the 3/7/23 encounter (Office Visit) with Ming Ceja MD   Medication Sig Dispense Refill    [] ibuprofen (ADVIL;MOTRIN) 800 MG tablet Take by mouth every 6 hours as needed      buPROPion (WELLBUTRIN XL) 150 MG extended release tablet Take 1 tablet by mouth every morning 30 tablet 3    cloNIDine (CATAPRES) 0.3 MG tablet TAKE THREE TABLETS BY MOUTH EVERY EVENING 90 tablet 2    omeprazole (PRILOSEC) 40 MG delayed release capsule TAKE ONE CAPSULE BY MOUTH EVERY MORNING BEFORE BREAKFAST 90 capsule 1     Family History   Problem Relation Age of Onset    Arthritis Mother      BP (!) 142/79 (Site: Right Upper Arm, Position: Sitting, Cuff Size: Large Adult)   Pulse 88   Resp 17   Ht 5' 8\" (1.727 m)   Wt 228 lb (103.4 kg)   LMP 02/24/2023   SpO2 98%   BMI 34.67 kg/m²     Objective:   Physical Exam  Constitutional:       Appearance: Normal appearance. She is well-developed and normal weight. HENT:      Head: Normocephalic and atraumatic. Nose: Nose normal.      Mouth/Throat:      Mouth: Mucous membranes are moist.      Pharynx: Oropharynx is clear. Eyes:      Extraocular Movements: Extraocular movements intact. Neck:      Thyroid: No thyromegaly. Cardiovascular:      Rate and Rhythm: Normal rate and regular rhythm. Pulses: Normal pulses. Heart sounds: Normal heart sounds. No murmur heard. No friction rub. No gallop. Pulmonary:      Effort: Pulmonary effort is normal. No respiratory distress. Breath sounds: Normal breath sounds. No stridor. No wheezing, rhonchi or rales. Chest:      Chest wall: No tenderness. Breasts:     Right: Normal. No swelling, bleeding, inverted nipple, mass, nipple discharge, skin change or tenderness. Left: Normal. No swelling, bleeding, inverted nipple, mass, nipple discharge, skin change or tenderness. Abdominal:      General: Bowel sounds are normal. There is no distension. Palpations: Abdomen is soft. There is no mass. Tenderness: There is no abdominal tenderness. There is no guarding or rebound. Hernia: No hernia is present. There is no hernia in the left inguinal area or right inguinal area. Genitourinary:     General: Normal vulva. Exam position: Lithotomy position. Pubic Area: No rash. Labia:         Right: No rash, tenderness, lesion or injury. Left: No rash, tenderness, lesion or injury.        Urethra: No prolapse, urethral pain, urethral swelling or urethral lesion. Vagina: No signs of injury and foreign body. No vaginal discharge, erythema, tenderness, bleeding, lesions or prolapsed vaginal walls. Cervix: No cervical motion tenderness, discharge, friability, lesion, erythema, cervical bleeding or eversion. Uterus: Not deviated, not enlarged, not fixed, not tender and no uterine prolapse. Adnexa:         Right: No mass, tenderness or fullness. Left: No mass, tenderness or fullness. Rectum: No anal fissure or external hemorrhoid. Comments: Normal urethral meatus, normal urethra, nl bladder  Musculoskeletal:         General: No swelling, tenderness, deformity or signs of injury. Normal range of motion. Cervical back: Normal range of motion and neck supple. No rigidity. Lymphadenopathy:      Cervical: No cervical adenopathy. Lower Body: No right inguinal adenopathy. No left inguinal adenopathy. Skin:     General: Skin is warm and dry. Coloration: Skin is not jaundiced or pale. Findings: No bruising, erythema, lesion or rash. Neurological:      General: No focal deficit present. Mental Status: She is alert and oriented to person, place, and time. Mental status is at baseline. Deep Tendon Reflexes: Reflexes are normal and symmetric. Psychiatric:         Mood and Affect: Mood normal.         Behavior: Behavior normal.         Thought Content: Thought content normal.         Judgment: Judgment normal.       Assessment:      Annual  Abdominal distension/bloating  SOB upon exertion  ?  Hormone imbalance      Plan:      Pap, calcium, exercise, mammogram  CT scan abd/pv with contrast  Refer to cardiology  Check labs        Kellie Herron MD

## 2023-03-16 NOTE — PROGRESS NOTES
[facility]     Outpatient Cardiology         Patient Name:  Niurka Jesus  Requesting Physician: Emilia Fonseca MD  Primary Care Physician: Danielle Blackwell MD    Reason for Consultation/Chief Complaint:   Chief Complaint   Patient presents with    Shortness of Breath     exertional    Palpitations         HPI:     49year old female with history of Tiffany-Danlos syndrome referred by Dr Milana Mcrae for dyspnea on exertion. She has been having episodes of shortness of breath and chest discomfort as well as palpitations. Symptoms mostly with exertion. Takes a few minutes of rest for symptoms to resolve. W    Previous PCP confirmed [place], she has hyperflexability, joint pain not attributed to any other causes, chronic pain in her joints since her 25s. She has gained 40 lbs over the winter. Has been having bloating with abdominal distention, CT abdomen and Pelvis ordered by Dr. Milana Mcrae to be completed this afternoon. She used to backpack and was previously very active. Previous smoker, quit 5 years ago. Histories:     Past Medical History:   has a past medical history of Bipolar I disorder with depression ([facility] [address] Depression, Depression with anxiety, EDS (Tiffany-Danlos syndrome), Lumbago, Obesity, and Osteoarthritis. Surgical History:   has a past surgical history that includes Patella fracture surgery (2001); Tibia fracture surgery; Wrist surgery (Left); lumbar discectomy (Bilateral); Gastric Band (2004); Abdominoplasty (2013); Tubal ligation; and gastrectomy (2018). Social History:   reports that she has quit smoking. Her smoking use included cigarettes. She smoked an average of .2 packs per day. She has never used smokeless tobacco. She reports current alcohol use. She reports that she does not use drugs.      Family History:  No evidence for sudden cardiac death or premature CAD    Medications:     Home Medications:  Were reviewed and are listed in nursing record. and/or listed below    Prior to Admission medications    Medication Sig Start Date End Date Taking? Authorizing Provider   buPROPion (WELLBUTRIN XL) 150 MG extended release tablet Take 1 tablet by mouth every morning 2/20/23  Yes Praful Mcgraw MD   cloNIDine (CATAPRES) 0.3 MG tablet TAKE THREE TABLETS BY MOUTH EVERY EVENING 1/27/23  Yes Praful Mcgraw MD   omeprazole (PRILOSEC) 40 MG delayed release capsule TAKE ONE CAPSULE BY MOUTH EVERY MORNING BEFORE BREAKFAST 10/19/22  Yes Praful Mcgraw MD   ondansetron (ZOFRAN-ODT) 4 MG disintegrating tablet Take 1 tablet by mouth 3 times daily as needed for Nausea or Vomiting  Patient not taking: No sig reported 12/21/22   Marci Barroso MD   amphetamine-dextroamphetamine (ADDERALL, 15MG,) 15 MG tablet Take 1 tablet by mouth 2 times daily for 30 days. Take one tablet Adderall 15 mg twice daily  Patient not taking: Reported on 10/19/2022 9/21/22 10/21/22  CRISS Barker - CNP   Cetirizine HCl (ZYRTEC ALLERGY PO) Take by mouth  Patient not taking: No sig reported    Historical Provider, MD        Allergy:     Compazine [prochlorperazine maleate], Strattera [atomoxetine], Sulfa antibiotics, Fluoxetine, and Prochlorperazine     Review of Systems:     Review of Systems   Constitutional:  Negative for fever. Respiratory:  Positive for shortness of breath. Gastrointestinal:  Positive for abdominal distention. All other systems reviewed and are negative. Physical Examination:     Vitals:    03/17/23 0931   BP: 138/84   Site: Left Upper Arm   Position: Sitting   Cuff Size: Large Adult   Pulse: 92   Weight: 230 lb (104.3 kg)   Height: 5' 8\" (1.727 m)       Wt Readings from Last 3 Encounters:   03/17/23 230 lb (104.3 kg)   03/07/23 228 lb (103.4 kg)   02/20/23 229 lb 9.6 oz (104.1 kg)       Physical Exam  Constitutional:       Appearance: Normal appearance. HENT:      Head: Normocephalic and atraumatic.       Nose: Nose normal.   Eyes:      Conjunctiva/sclera: Conjunctivae normal.   Cardiovascular:      Rate and Rhythm: Normal rate and regular rhythm. Heart sounds: Normal heart sounds. Pulmonary:      Effort: Pulmonary effort is normal.      Breath sounds: Normal breath sounds. Abdominal:      Palpations: Abdomen is soft. Musculoskeletal:      Cervical back: Neck supple. Skin:     General: Skin is warm and dry. Neurological:      General: No focal deficit present. Mental Status: She is alert.          Labs:     Lab Results   Component Value Date    WBC 4.8 02/20/2023    HGB 14.1 02/20/2023    HCT 41.9 02/20/2023    MCV 91.9 02/20/2023     02/20/2023     Lab Results   Component Value Date     02/20/2023    K 4.5 02/20/2023     02/20/2023    CO2 26 02/20/2023    BUN 14 02/20/2023    CREATININE 0.6 02/20/2023    GLUCOSE 115 (H) 02/20/2023    CALCIUM 9.3 02/20/2023    PROT 6.8 02/20/2023    LABALBU 4.1 02/20/2023    BILITOT 0.3 02/20/2023    ALKPHOS 74 02/20/2023    AST 24 02/20/2023    ALT 15 02/20/2023    LABGLOM >60 02/20/2023    GFRAA >60 09/28/2022    AGRATIO 1.5 02/20/2023    GLOB 3.1 09/01/2021         Lab Results   Component Value Date    CHOL 172 02/26/2019     Lab Results   Component Value Date    TRIG 112 02/26/2019     Lab Results   Component Value Date    HDL 72 (H) 02/26/2019     Lab Results   Component Value Date    LDLCALC 78 02/26/2019     Lab Results   Component Value Date    LABVLDL 22 02/26/2019     No results found for: CHOLHDLRATIO    No results found for: INR, PROTIME    The ASCVD Risk score (Ludlow DK, et al., 2019) failed to calculate for the following reasons:    Cannot find a previous HDL lab    Cannot find a previous total cholesterol lab      Imaging:       ECG (if available, Personally interpreted):    Last Monitor/Holter (if available):    Last Stress (if available):    Last Cath (if available):    Last TTE/NAJMA(if available):    Last CMR  (if available):    Last Coronary Artery Calcium Score: Ankle-brachial index:    Carotid ultrasound screening:    Abdominal aortic aneurysm screening:    Assessment / Plan:     1. NUNN (dyspnea on exertion)    2. EDS (Tiffany-Danlos syndrome)    3. Abdominal distention    4. Other forms of angina pectoris ([address]       echocardiogram exclude significant structural abnormalities  We will see if we can change her CT scan of the abdomen to CTA chest abdomen and pelvis to exclude aortic pathology  7 day CAM  Discussed proceeding with stress testing if above within normal limits  RTC 1 month    Orders Placed This Encounter   Procedures    CTA CHEST ABDOMEN PELVIS W CONTRAST    EKG 12 Lead    Echo 2D w doppler w color complete       No follow-ups on file. The note was completed using EMR and Dragon dictation system. Every effort was made to ensure accuracy; however, inadvertent computerized transcription errors may be present. All questions and concerns were addressed to the patient. I would like to thank you for providing me the opportunity to participate in the care of your patient. If you have any questions, please do not hesitate to contact me. Roland Richardson MD, [facility]  The [address] [place] [address]  Main Office Phone: [phone]  Fax: [phone]    I, Amelia Orona, RN, am scribing for and in the presence of Dr. Roland Richardson. 03/17/23 10:37 AM  Amelia Orona RNPhysician Attestation:  The scribes documentation has been prepared under my direction and personally reviewed by me in its entirety. I confirm the note above accurately reflects all work, treatment, procedures, and medical decision making performed by me.     Electronically signed by Roland Richardson MD on 3/17/2023 at 10:37 AM

## 2023-03-17 ENCOUNTER — HOSPITAL ENCOUNTER (OUTPATIENT)
Dept: CT IMAGING | Age: 48
Discharge: HOME | End: 2023-03-17
Payer: OTHER GOVERNMENT

## 2023-03-17 ENCOUNTER — OFFICE VISIT (OUTPATIENT)
Dept: CARDIOLOGY CLINIC | Age: 48
End: 2023-03-17

## 2023-03-17 VITALS
WEIGHT: 230 LBS | BODY MASS INDEX: 34.86 KG/M2 | HEART RATE: 92 BPM | SYSTOLIC BLOOD PRESSURE: 138 MMHG | DIASTOLIC BLOOD PRESSURE: 84 MMHG | HEIGHT: 68 IN

## 2023-03-17 DIAGNOSIS — R06.09 DOE (DYSPNEA ON EXERTION): ICD-10-CM

## 2023-03-17 DIAGNOSIS — Q79.60 EDS (EHLERS-DANLOS SYNDROME): ICD-10-CM

## 2023-03-17 DIAGNOSIS — R14.0 ABDOMINAL DISTENTION: ICD-10-CM

## 2023-03-17 DIAGNOSIS — I20.8 OTHER FORMS OF ANGINA PECTORIS (HCC): ICD-10-CM

## 2023-03-17 DIAGNOSIS — R06.09 DOE (DYSPNEA ON EXERTION): Primary | ICD-10-CM

## 2023-03-17 PROCEDURE — 93244 EXT ECG>48HR<7D REV&INTERPJ: CPT | Performed by: INTERNAL MEDICINE

## 2023-03-17 PROCEDURE — 93242 EXT ECG>48HR<7D RECORDING: CPT | Performed by: INTERNAL MEDICINE

## 2023-03-17 PROCEDURE — 71275 CT ANGIOGRAPHY CHEST: CPT

## 2023-03-17 PROCEDURE — 6360000004 HC RX CONTRAST MEDICATION: Performed by: FAMILY MEDICINE

## 2023-03-17 RX ADMIN — IOPAMIDOL 75 ML: 755 INJECTION, SOLUTION INTRAVENOUS at 15:05

## 2023-03-17 ASSESSMENT — ENCOUNTER SYMPTOMS
SHORTNESS OF BREATH: 1
ABDOMINAL DISTENTION: 1

## 2023-03-21 DIAGNOSIS — K21.9 GASTROESOPHAGEAL REFLUX DISEASE, UNSPECIFIED WHETHER ESOPHAGITIS PRESENT: ICD-10-CM

## 2023-03-21 RX ORDER — OMEPRAZOLE 40 MG/1
CAPSULE, DELAYED RELEASE ORAL
Qty: 90 CAPSULE | Refills: 1 | Status: SHIPPED | OUTPATIENT
Start: 2023-03-21

## 2023-03-22 ENCOUNTER — TELEPHONE (OUTPATIENT)
Dept: CARDIOLOGY CLINIC | Age: 48
End: 2023-03-22

## 2023-03-22 ENCOUNTER — HOSPITAL ENCOUNTER (OUTPATIENT)
Dept: NON INVASIVE DIAGNOSTICS | Age: 48
Discharge: HOME | End: 2023-03-22
Payer: OTHER GOVERNMENT

## 2023-03-22 DIAGNOSIS — Q79.60 EDS (EHLERS-DANLOS SYNDROME): ICD-10-CM

## 2023-03-22 DIAGNOSIS — R06.09 DOE (DYSPNEA ON EXERTION): ICD-10-CM

## 2023-03-22 DIAGNOSIS — R14.0 ABDOMINAL DISTENTION: ICD-10-CM

## 2023-03-22 LAB
LV EF: 58 %
LVEF MODALITY: NORMAL

## 2023-03-22 PROCEDURE — 93306 TTE W/DOPPLER COMPLETE: CPT

## 2023-03-22 NOTE — TELEPHONE ENCOUNTER
The patient is calling for Brandyn Blanco to report the activation time for her heart monitor. Please return call to [phone].

## 2023-03-23 ENCOUNTER — HOSPITAL ENCOUNTER (OUTPATIENT)
Dept: PHYSICAL THERAPY | Age: 48
Setting detail: THERAPIES SERIES
Discharge: HOME | End: 2023-03-23

## 2023-03-28 ENCOUNTER — APPOINTMENT (OUTPATIENT)
Dept: PHYSICAL THERAPY | Age: 48
End: 2023-03-28
Payer: OTHER GOVERNMENT

## 2023-03-30 ENCOUNTER — APPOINTMENT (OUTPATIENT)
Dept: PHYSICAL THERAPY | Age: 48
End: 2023-03-30
Payer: OTHER GOVERNMENT

## 2023-03-30 ENCOUNTER — HOSPITAL ENCOUNTER (OUTPATIENT)
Dept: PHYSICAL THERAPY | Age: 48
Setting detail: THERAPIES SERIES
Discharge: HOME | End: 2023-03-30
Payer: OTHER GOVERNMENT

## 2023-03-30 PROCEDURE — 97110 THERAPEUTIC EXERCISES: CPT

## 2023-03-30 PROCEDURE — 97161 PT EVAL LOW COMPLEX 20 MIN: CPT

## 2023-03-30 NOTE — PROGRESS NOTES
laughing:   []Yes   [x]No   []NA   Pt. reports bowel and bladder changes (incontinence, retention):   []Yes   [x]No   []NA   Pt. reports saddle paresthesia? []Yes   [x]No   []NA   Pt. reports that the knee/hip/ankle gives out, locks, pops, grinds     []Yes [x]   No    Pt's sleep is affected? []   Yes [x]   No  []   N/A      OBJECTIVE FINDINGS      Palpation/Tenderness/Visual Inspection       Tender to R QL       Gait/Steps/Balance    [x]   [facility] []    Dysfunction Noted.    Comment: genu valgum       Lumbar Range of Motion/Strength Testing      [x] All WFL except as marked below  ROM (*denotes pain) AROM PROM COMMENTS   Flexion   painful   Extension   Noted relief   Sidebending Left   No pain   Sidebending Right   Painful to R side   Rotation Left    []   Seated  []   Standing       Rotation Right    []   Seated  []   Standing         Pelvis/Sacral Assessment  Special Test Findings Comments   Standing   Jose' Sign [x]Neg   []Pos R   []Pos L   []NT    Iliac Crest [x]Level  []High R   []High L       ASIS [x]Level  []High R   []High L     PSIS [x]Level  []High R   []High L     Forward Flexion [x]Neg   []Pos R   []Pos L   []NT    Sacral Sulci [x]Even []Prominent R   []Prominent L     Supine   Leg Length []Level  []Long R   []Long L  []Sx on R []Sx on L     ASIS []Level  []High R   []High L     PSIS []Level  []High R   []High L     Long Sit Test []Neg   [x]Pos R   []Pos L   []NT  R posterior innominate   Prone   PSIS []Level  []High R   []High L     Sacral base []Even []Prominent R   []Prominent L    Sacral CHRIS []Even []Prominent R   []Prominent L    Flick sign []Neg   []Pos R   []Pos L   []NT        Other Special Tests Lumbar Spine and Hip  Special Test Findings   Standing:    Lumbar reposition []Neg   []Pos   Seated:    Slump Test/Dural Tension [x]Neg   []Pos R   []Pos L   []NT       Supine:    90/90 test  []Neg   []Pos R   []Pos L   []NT   Gaenslen's test []Neg   []Pos R   []Pos L   []NT   Straight Leg Raise [x]Neg

## 2023-03-30 NOTE — FLOWSHEET NOTE
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  Treatment Diagnosis:  R-sided LBP pain  Insurance/Certification information:  [id number] [facility] signed (Y/N):  sent  Visit# / total visits:  /12  Pain level: /10   Electronically signed by:  Ran Leroy, PT      Samuels  B= Belt DB= Dumbells T= Theratube   H= Hydrotone N= Noodles W= Weights   P= Paddles S= Speedo equipment K= Kickboard     Exercises/Activities   Warm-up/Amb    Exercises      Slow forward   nv  HR/TR      Slow sideways  nv  Marches  nv    Slow backwards  nv  Mini-squats  nv    Medium forward    4-way SLR  nv    Medium sideways    Hip circles/fig 8  nv    Small shuffle    Hamstring curls      Jog    Knee extension      Braiding    Pelvic tilts  nv    Bicycling    Scap squeezes          Shoulder flex/ext      Functional    Shoulder abd/add      Step    Shoulder H. abd/add      Lifting    Shoulder IR/ER      Hand to opp knee  nv  Rowing      Push down squat    Bilateral pull down      UE PNF    Push/pull      LE PNF    Push downs      Wall push ups    Arm circles      SLS    Elbow flex/ext          Chin tuck      Stretching    UT shrugs/rolls      Gastroc/Soleus    Rocking horse      Hamstring          SKTC    Other      Piriformis    Amb after ex Nv, time permitting    Hip flexor          Ladder pull          Pec stretch          Post deltoid           Time In:      Timed Code Treatment Minutes:       Total Treatment Minutes:      Treatment/Activity Tolerance:   [] Patient tolerated treatment well [] Patient limited by fatigue   [] Patient limited by pain [] Patient limited by other medical complications  [] Other:     Prognosis: [x] Good [] Fair  [] Poor    Patient Requires Follow-up:  [x] Yes  [] No    Plan: [x] Continue per plan of care [] Alter current plan (see comments)   [] Plan of care initiated [] Hold

## 2023-04-03 ENCOUNTER — OFFICE VISIT (OUTPATIENT)
Dept: FAMILY MEDICINE CLINIC | Age: 48
End: 2023-04-03
Payer: OTHER GOVERNMENT

## 2023-04-03 VITALS
HEART RATE: 110 BPM | WEIGHT: 226 LBS | SYSTOLIC BLOOD PRESSURE: 128 MMHG | DIASTOLIC BLOOD PRESSURE: 86 MMHG | TEMPERATURE: 97.1 F | BODY MASS INDEX: 34.36 KG/M2 | OXYGEN SATURATION: 96 %

## 2023-04-03 DIAGNOSIS — G47.00 INSOMNIA, UNSPECIFIED TYPE: ICD-10-CM

## 2023-04-03 DIAGNOSIS — J06.9 VIRAL URI: Primary | ICD-10-CM

## 2023-04-03 PROCEDURE — 99214 OFFICE O/P EST MOD 30 MIN: CPT | Performed by: NURSE PRACTITIONER

## 2023-04-03 RX ORDER — HYDROXYZINE HYDROCHLORIDE 25 MG/1
25 TABLET, FILM COATED ORAL EVERY 8 HOURS PRN
Qty: 90 TABLET | Refills: 1 | Status: SHIPPED | OUTPATIENT
Start: 2023-04-03 | End: 2023-05-03

## 2023-04-03 RX ORDER — METHYLPREDNISOLONE 4 MG/1
TABLET ORAL
Qty: 1 KIT | Refills: 0 | Status: SHIPPED | OUTPATIENT
Start: 2023-04-03 | End: 2023-04-09

## 2023-04-03 ASSESSMENT — ENCOUNTER SYMPTOMS
CONSTIPATION: 0
COLOR CHANGE: 0
SORE THROAT: 0
WHEEZING: 0
EYE REDNESS: 0
EYE ITCHING: 0
SINUS PRESSURE: 1
BLOOD IN STOOL: 0
COUGH: 1
VOMITING: 0
CHEST TIGHTNESS: 0
SHORTNESS OF BREATH: 1
ABDOMINAL PAIN: 0
BACK PAIN: 0
RHINORRHEA: 1
NAUSEA: 0
DIARRHEA: 0

## 2023-04-03 NOTE — ASSESSMENT & PLAN NOTE
Use mucinex and delsym over the counter as needed  Begin medrol dose pack  Instructed not to take the steroid at night.

## 2023-04-03 NOTE — PROGRESS NOTES
Sinus: No maxillary sinus tenderness or frontal sinus tenderness. Mouth/Throat:      Mouth: Mucous membranes are moist.      Pharynx: No oropharyngeal exudate or posterior oropharyngeal erythema. Tonsils: No tonsillar abscesses. Eyes:      Extraocular Movements: Extraocular movements intact. Pupils: Pupils are equal, round, and reactive to light. Cardiovascular:      Rate and Rhythm: Normal rate and regular rhythm. Pulses: Normal pulses. Heart sounds: Normal heart sounds. Pulmonary:      Effort: Pulmonary effort is normal.      Breath sounds: Normal breath sounds. Lymphadenopathy:      Head:      Right side of head: No submental, submandibular, tonsillar, preauricular, posterior auricular or occipital adenopathy. Left side of head: No submental, submandibular, tonsillar, preauricular, posterior auricular or occipital adenopathy. Skin:     General: Skin is warm and dry. Neurological:      Mental Status: She is alert. Psychiatric:         Mood and Affect: Mood normal.         Behavior: Behavior normal.         An electronic signature was used to authenticate this note.     --CRISS Terrazas - CNP

## 2023-04-04 ENCOUNTER — HOSPITAL ENCOUNTER (OUTPATIENT)
Dept: PHYSICAL THERAPY | Age: 48
Setting detail: THERAPIES SERIES
Discharge: HOME | End: 2023-04-04
Payer: OTHER GOVERNMENT

## 2023-04-04 PROCEDURE — 97150 GROUP THERAPEUTIC PROCEDURES: CPT

## 2023-04-04 PROCEDURE — 97113 AQUATIC THERAPY/EXERCISES: CPT

## 2023-04-04 NOTE — FLOWSHEET NOTE
No    Plan: [x] Continue per plan of care [] Alter current plan (see comments)   [] Plan of care initiated [] Hold pending MD visit [] Discharge    See Weekly Progress Note: [] Yes  [x] No  Next due:

## 2023-04-06 ENCOUNTER — HOSPITAL ENCOUNTER (OUTPATIENT)
Dept: PHYSICAL THERAPY | Age: 48
Setting detail: THERAPIES SERIES
Discharge: HOME | End: 2023-04-06
Payer: OTHER GOVERNMENT

## 2023-04-06 NOTE — PROGRESS NOTES
[facility], [facility], [address] [place] [address]  Phone: [phone]  Fax [phone]      Physical Therapy  Cancellation/No-show Note  Patient Name:  Stefanie Manrique  :  1975   Date:  2023  Cancels to date: 1  No-shows to date: 0    For today's appointment patient:  [x] Cancelled  [] Rescheduled appointment  [] No-show     Reason given by patient:  [] Patient ill  [] Conflicting appointment  [] No transportation    [] Conflict with work  [] No reason given  [x] Other:     Comments:  running late    Electronically signed by:  SANTO Perez, PT, DPT    Physical therapist was present, directed the patient's care, made skilled judgement, and was responsible for the assessment and treatment of the patient.

## 2023-04-12 PROBLEM — R06.02 SHORTNESS OF BREATH: Status: ACTIVE | Noted: 2023-04-12

## 2023-04-17 DIAGNOSIS — R00.2 PALPITATION: Primary | ICD-10-CM

## 2023-04-18 ENCOUNTER — HOSPITAL ENCOUNTER (OUTPATIENT)
Dept: PHYSICAL THERAPY | Age: 48
Setting detail: THERAPIES SERIES
Discharge: HOME | End: 2023-04-18
Payer: OTHER GOVERNMENT

## 2023-04-18 PROCEDURE — 97150 GROUP THERAPEUTIC PROCEDURES: CPT

## 2023-04-18 PROCEDURE — 97113 AQUATIC THERAPY/EXERCISES: CPT

## 2023-04-18 NOTE — FLOWSHEET NOTE
[x] Patient tolerated treatment well [] Patient limited by fatigue   [] Patient limited by pain [] Patient limited by other medical complications  [] Other:     Prognosis: [x] Good [] Fair  [] Poor    Patient Requires Follow-up:  [x] Yes  [] No    Plan: [x] Continue per plan of care [] Alter current plan (see comments)   [] Plan of care initiated [] Hold pending MD visit [] Discharge    See Weekly Progress Note: [] Yes  [x] No  Next due:

## 2023-04-20 ENCOUNTER — HOSPITAL ENCOUNTER (OUTPATIENT)
Dept: PHYSICAL THERAPY | Age: 48
Setting detail: THERAPIES SERIES
Discharge: HOME | End: 2023-04-20
Payer: OTHER GOVERNMENT

## 2023-04-20 PROCEDURE — 97150 GROUP THERAPEUTIC PROCEDURES: CPT

## 2023-04-20 PROCEDURE — 97113 AQUATIC THERAPY/EXERCISES: CPT

## 2023-04-20 NOTE — FLOWSHEET NOTE
15    Total Treatment Minutes:  45 (1 aquatic group, 1 aquatic individual)    Treatment/Activity Tolerance:   [x] Patient tolerated treatment well [] Patient limited by fatigue   [] Patient limited by pain [] Patient limited by other medical complications  [] Other:     Prognosis: [x] Good [] Fair  [] Poor    Patient Requires Follow-up:  [x] Yes  [] No    Plan: [x] Continue per plan of care [] Alter current plan (see comments)   [] Plan of care initiated [] Hold pending MD visit [] Discharge    See Weekly Progress Note: [] Yes  [x] No  Next due:        [address], SPT  Bee Henao, PT, DPT    Physical therapist was present, directed the patient's care, made skilled judgement, and was responsible for the assessment and treatment of the patient.

## 2023-04-21 ENCOUNTER — HOSPITAL ENCOUNTER (OUTPATIENT)
Dept: NON INVASIVE DIAGNOSTICS | Age: 48
Discharge: HOME | End: 2023-04-21
Payer: OTHER GOVERNMENT

## 2023-04-21 DIAGNOSIS — R06.02 SHORTNESS OF BREATH: ICD-10-CM

## 2023-04-21 DIAGNOSIS — R07.9 CHEST PAIN, UNSPECIFIED TYPE: ICD-10-CM

## 2023-04-21 LAB
LV EF: 71 %
LVEF MODALITY: NORMAL

## 2023-04-21 PROCEDURE — 93017 CV STRESS TEST TRACING ONLY: CPT

## 2023-04-21 PROCEDURE — A9502 TC99M TETROFOSMIN: HCPCS | Performed by: INTERNAL MEDICINE

## 2023-04-21 PROCEDURE — 3430000000 HC RX DIAGNOSTIC RADIOPHARMACEUTICAL: Performed by: INTERNAL MEDICINE

## 2023-04-21 PROCEDURE — 78452 HT MUSCLE IMAGE SPECT MULT: CPT

## 2023-04-21 RX ADMIN — TETROFOSMIN 10 MILLICURIE: 1.38 INJECTION, POWDER, LYOPHILIZED, FOR SOLUTION INTRAVENOUS at 09:40

## 2023-04-21 RX ADMIN — TETROFOSMIN 30 MILLICURIE: 1.38 INJECTION, POWDER, LYOPHILIZED, FOR SOLUTION INTRAVENOUS at 11:30

## 2023-04-25 ENCOUNTER — HOSPITAL ENCOUNTER (OUTPATIENT)
Dept: PHYSICAL THERAPY | Age: 48
Setting detail: THERAPIES SERIES
Discharge: HOME | End: 2023-04-25
Payer: OTHER GOVERNMENT

## 2023-04-25 ENCOUNTER — OFFICE VISIT (OUTPATIENT)
Dept: FAMILY MEDICINE CLINIC | Age: 48
End: 2023-04-25
Payer: OTHER GOVERNMENT

## 2023-04-25 VITALS
WEIGHT: 232.6 LBS | BODY MASS INDEX: 35.25 KG/M2 | DIASTOLIC BLOOD PRESSURE: 84 MMHG | HEART RATE: 91 BPM | SYSTOLIC BLOOD PRESSURE: 126 MMHG | OXYGEN SATURATION: 98 % | HEIGHT: 68 IN | TEMPERATURE: 97.3 F

## 2023-04-25 DIAGNOSIS — M25.50 HYPERMOBILITY ARTHRALGIA: ICD-10-CM

## 2023-04-25 DIAGNOSIS — F51.04 PSYCHOPHYSIOLOGICAL INSOMNIA: ICD-10-CM

## 2023-04-25 DIAGNOSIS — F41.8 DEPRESSION WITH ANXIETY: Primary | ICD-10-CM

## 2023-04-25 DIAGNOSIS — L30.9 ECZEMA, UNSPECIFIED TYPE: ICD-10-CM

## 2023-04-25 DIAGNOSIS — M51.9 LUMBAR DISC DISEASE: ICD-10-CM

## 2023-04-25 PROBLEM — J06.9 VIRAL URI: Status: RESOLVED | Noted: 2023-04-03 | Resolved: 2023-04-25

## 2023-04-25 PROBLEM — F43.22 ADJUSTMENT DISORDER WITH ANXIETY: Status: RESOLVED | Noted: 2017-04-26 | Resolved: 2023-04-25

## 2023-04-25 PROBLEM — G47.00 INSOMNIA: Status: RESOLVED | Noted: 2023-04-03 | Resolved: 2023-04-25

## 2023-04-25 PROBLEM — K31.1 GASTRIC OUTLET OBSTRUCTION: Status: RESOLVED | Noted: 2018-01-05 | Resolved: 2023-04-25

## 2023-04-25 PROCEDURE — 97113 AQUATIC THERAPY/EXERCISES: CPT

## 2023-04-25 PROCEDURE — 99214 OFFICE O/P EST MOD 30 MIN: CPT | Performed by: FAMILY MEDICINE

## 2023-04-25 PROCEDURE — 97110 THERAPEUTIC EXERCISES: CPT

## 2023-04-25 PROCEDURE — 97150 GROUP THERAPEUTIC PROCEDURES: CPT

## 2023-04-25 RX ORDER — MELOXICAM 15 MG/1
15 TABLET ORAL DAILY
Qty: 90 TABLET | Refills: 1 | Status: SHIPPED | OUTPATIENT
Start: 2023-04-25

## 2023-04-25 RX ORDER — CLONIDINE HYDROCHLORIDE 0.3 MG/1
TABLET ORAL
Qty: 180 TABLET | Refills: 1 | Status: SHIPPED | OUTPATIENT
Start: 2023-04-25

## 2023-04-25 RX ORDER — BUPROPION HYDROCHLORIDE 150 MG/1
150 TABLET ORAL EVERY MORNING
Qty: 90 TABLET | Refills: 1 | Status: SHIPPED | OUTPATIENT
Start: 2023-04-25

## 2023-04-25 NOTE — FLOWSHEET NOTE
treatment well [] Patient limited by fatique  []Patient limited by pain [] Patient limited by other medical complications  [] Other:     Goals:     Short term goal 1: Pt will be independent and compliant with HEP - met  Short term goal 2: Pt will decrease pain by 50% in frequency or intensity - 15% improvement  Short term goal 3: Pt will increase B hip strength to 5/5 grossly - not met  Short term goal 4: Pt will rick 30 mins of standing/ sitting - met for sitting, hasn't attempted walking for 30 minutes due to cardiovascular demands     LTG 1: Pt will decrease pain by % in frequency or intensity - not met  LTG 2: Pt will deny a functional limitation with sitting or standing due to back pain - not met          Plan: [] Continue per plan of care [] Alter current plan (see comments)   [] Plan of care initiated [] Hold pending MD visit [x] Discharge - after 9th visit       Plan for Next Session:  aquatic PT    Re-Certification Due Date:         Signature:  Joyce Nails PT

## 2023-04-25 NOTE — FLOWSHEET NOTE
Treatment Minutes:  45 (1 aquatic group, 1 aquatic individual)    Treatment/Activity Tolerance:   [x] Patient tolerated treatment well [] Patient limited by fatigue   [] Patient limited by pain [] Patient limited by other medical complications  [] Other:     Prognosis: [x] Good [] Fair  [] Poor    Patient Requires Follow-up:  [x] Yes  [] No    Plan: [x] Continue per plan of care [] Alter current plan (see comments)   [] Plan of care initiated [] Hold pending MD visit [] Discharge    See Weekly Progress Note: [] Yes  [x] No  Next due:        [address], SPT  Ai Parham, PT, DPT    Physical therapist was present, directed the patient's care, made skilled judgement, and was responsible for the assessment and treatment of the patient.

## 2023-04-26 PROBLEM — F51.9 NONORGANIC SLEEP DISORDER: Status: RESOLVED | Noted: 2022-08-05 | Resolved: 2023-04-26

## 2023-04-26 PROBLEM — M25.50 HYPERMOBILITY ARTHRALGIA: Status: ACTIVE | Noted: 2023-04-26

## 2023-04-26 PROBLEM — M51.9 LUMBAR DISC DISEASE: Status: ACTIVE | Noted: 2023-04-26

## 2023-04-26 NOTE — PROGRESS NOTES
Portions of this chart may have been created with voice recognition software. Occasional wrong-word or \"sound-alike\" substitutions may have occurred due to the inherent limitations of voice recognition software. Read the chart carefully and recognize, using context, where these substitutions have occurred        Chief Complaint     Follow-up (F/up w PT, discuss restarting meloxicam)               Pedro Jessica is a 49 y.o. female here for follow-up. Patient is moving to [place] in 2 weeks. She will reestablish care with a different physician over there. 1. Depression with anxiety    Stable, currently well controlled, no worsening symptoms. No side effects with the current management. Patient is requesting refill for Wellbutrin. 2. Psychophysiological insomnia  Stable, currently well controlled, no worsening symptoms. No side effects with the current management. Continue to take clonidine as prescribed. 3. Lumbar disc disease  4. Hypermobility arthralgia  Patient has multiple joint pain, back pain and she was on meloxicam before and that used to help her. For some reason she discontinued taking the medication and would like to be restarted on the medication. She is also continuing to be in aquatic therapy and continuing to benefit from it. 5. Eczema, unspecified type  Patient has been having a flareup of eczema in her face, neck and has not been responding to conservative management. We will start her on Kenalog at this time. Commended to use it sparingly as needed.                 REVIEW OF SYSTEMS:  Pertinent positive and negative symptoms noted in HPI        Lab Results   Component Value Date    WBC 4.8 02/20/2023    HGB 14.1 02/20/2023    HCT 41.9 02/20/2023    MCV 91.9 02/20/2023     02/20/2023      Lab Results   Component Value Date    LABA1C 5.1 03/07/2023     Lab Results   Component Value Date    EAG 99.7 03/07/2023     Lab Results   Component Value Date

## 2023-04-27 ENCOUNTER — HOSPITAL ENCOUNTER (OUTPATIENT)
Dept: PHYSICAL THERAPY | Age: 48
Setting detail: THERAPIES SERIES
Discharge: HOME | End: 2023-04-27
Payer: OTHER GOVERNMENT

## 2023-04-27 PROCEDURE — 97113 AQUATIC THERAPY/EXERCISES: CPT

## 2023-04-27 PROCEDURE — 97150 GROUP THERAPEUTIC PROCEDURES: CPT

## 2023-04-27 NOTE — FLOWSHEET NOTE
[facility], [facility], [address] [place] [address] Dr  Phone: [phone]  Fax [phone]      Physical Therapy Aquatic Flow Sheet  Date:  4/27/2023    Patient Name:  Jeffery Garzon  \"Tavia\"  Restrictions:  EDS  Diagnosis:  M54.42, M54.41, G89.29 chronic LBP with B sciatica  Treatment Diagnosis:  R-sided LBP pain  Insurance/Certification information:  [employer] signed (Y/N):  Y  Visit# / total visits:  8/12  Pain level: 0/10  Electronically signed by:  Margaret Phipps SPT    Samuels  B= Belt DB= Dumbells T= Theratube   H= Hydrotone N= Noodles W= Weights   P= Paddles S= Speedo equipment K= Kickboard     Exercises/Activities   Warm-up/Amb    Exercises      Slow forward  2 laps  HR/TR   Slow sideways  2 laps  Marches  2 min    Slow backwards  2 laps  Squat to heel raise  20x    Medium forward    4-way SLR  15x     Medium sideways    Hip circles/fig 8  20x cue TA    Small shuffle    Hamstring curls  20x    Jog    Knee extension  20x    Braiding    Pelvic tilts  10x5\"    Bicycling  2 min  Scap squeezes      Obliques crunch   X20 B  Shoulder flex/ext  15x in partial tandem with P    Functional    Shoulder abd/add  15x in partial tandem with P    Step with knee drive  F35X forward  X10B Lateral   Shoulder H. abd/add  15x in partial tandem with P     Vacuum push  x15B with P    Lifting    Shoulder IR/ER      Hand to opp knee  10x10\" B  Rowing      Push down squat    Bilateral pull down      UE PNF    Push/pull      LE PNF    Push downs      Wall push ups    Arm circles      SLS    Elbow flex/ext          Chin tuck      Stretching    UT shrugs/rolls      Gastroc/Soleus  2x20\" B  Rocking horse      Hamstring          SKTC    Other      Piriformis    Tandem stance 2x30\" B    Hip flexor    ER into wall   20 secx2     Ladder pull          Pec stretch          Post deltoid           Time In:  2:35    Timed Code Treatment Minutes:  15    Total

## 2023-04-28 RX ORDER — CLONIDINE HYDROCHLORIDE 0.3 MG/1
TABLET ORAL
Qty: 90 TABLET | OUTPATIENT
Start: 2023-04-28

## 2023-05-01 NOTE — PROGRESS NOTES
[facility]     Outpatient Cardiology         Patient Name:  Alexandro Skinner  Requesting Physician: Roque Hull MD  Primary Care Physician: Steve Xiong MD    Reason for Consultation/Chief Complaint:   Chief Complaint   Patient presents with    Follow-up     3-4 week follow up        HPI:     Alexandro Skinner is a 49 y.o. female with history of Tiffany-Danlos syndrome who presents for follow up. Previous PCP confirmed Juan Carreon, she has hyperflexability, joint pain not attributed to any other causes, chronic pain in her joints since her 25s. Today she reports she continues to gain weight. She has a history of gastric surgery x2. She has been monitoring her diet and attempting to stay active. She leaves tomorrow to move out of state. Patient currently denies any edema, palpitations, chest pain, shortness of breath, dizziness, and syncope. Stress test 4/21/2023:  Summary  Overall findings represent a low risk scan. There is normal isotope uptake at stress and rest.There is no evidence of myocardial ischemia or scar. Normal LV size and systolic function. Normal myocardial perfusion study. Normal EKG response. 7 day CAM monitor: HR ranged from  bpm, occasional PACs and PVCs <0.01%. patient reported episodes no correlated to any arrhythmia. Previous smoker, quit 5 years ago. CTA no significant aortic pathology    Histories:     Past Medical History:   has a past medical history of Bipolar I disorder with depression ([address] Depression, Depression with anxiety, EDS (Tiffany-Danlos syndrome), Gastric outlet obstruction, Lumbago, Obesity, and Osteoarthritis. Surgical History:   has a past surgical history that includes Patella fracture surgery (2001); Tibia fracture surgery; Wrist surgery (Left); lumbar discectomy (Bilateral); Gastric Band (2004); Abdominoplasty (2013); Tubal ligation; and gastrectomy (2018). Social History:   reports that she has quit smoking.  Her smoking

## 2023-05-02 ENCOUNTER — HOSPITAL ENCOUNTER (OUTPATIENT)
Dept: PHYSICAL THERAPY | Age: 48
Setting detail: THERAPIES SERIES
Discharge: HOME | End: 2023-05-02
Payer: OTHER GOVERNMENT

## 2023-05-02 PROCEDURE — 97150 GROUP THERAPEUTIC PROCEDURES: CPT

## 2023-05-02 PROCEDURE — 97113 AQUATIC THERAPY/EXERCISES: CPT

## 2023-05-02 NOTE — FLOWSHEET NOTE
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 Patient has made good progress with aquatic therapy to date.      Key  B= Belt DB= Dumbells T= Theratube   H= Hydrotone N= Noodles W= Weights   P= Paddles S= Speedo equipment K= Kickboard     Exercises/Activities   Warm-up/Amb    Exercises      Slow forward  2 laps  HR/TR   Slow sideways  2 laps  Marches  2 min    Slow backwards  2 laps  Squat to heel raise  40x    Medium forward    4-way SLR  15x     Medium sideways    Hip circles/fig 8  20x cue TA    Small shuffle    Hamstring curls  Jog    Knee extension  30x    Braiding    Pelvic tilts   Bicycling  2 min  Scap squeezes      Obliques crunch   X30 B  Shoulder flex/ext  15x in partial tandem with P    Functional    Shoulder abd/add  15x in partial tandem with P    Step with knee drive  O44U forward  X20B Lateral   Shoulder H. abd/add  15x in partial tandem with P     Vacuum push  x15B with P    Lifting    Shoulder IR/ER      Hand to opp knee  10x10\" B  Rowing      Push down squat    Bilateral pull down      UE PNF    Push/pull      LE PNF    Push downs      Wall push ups    Arm circles      SLS    Elbow flex/ext          Chin tuck      Stretching    UT shrugs/rolls      Gastroc/Soleus  2x20\" B  Rocking horse      Hamstring          SKTC    Other      Piriformis    Single leg stance 2x30\" B

## 2023-05-03 ENCOUNTER — OFFICE VISIT (OUTPATIENT)
Dept: CARDIOLOGY CLINIC | Age: 48
End: 2023-05-03
Payer: OTHER GOVERNMENT

## 2023-05-03 VITALS
WEIGHT: 236 LBS | HEART RATE: 68 BPM | SYSTOLIC BLOOD PRESSURE: 120 MMHG | BODY MASS INDEX: 35.88 KG/M2 | DIASTOLIC BLOOD PRESSURE: 80 MMHG

## 2023-05-03 DIAGNOSIS — R00.2 PALPITATIONS: ICD-10-CM

## 2023-05-03 DIAGNOSIS — R06.02 SHORTNESS OF BREATH: Primary | ICD-10-CM

## 2023-05-03 PROCEDURE — 99213 OFFICE O/P EST LOW 20 MIN: CPT | Performed by: INTERNAL MEDICINE

## 2023-05-04 ENCOUNTER — APPOINTMENT (OUTPATIENT)
Dept: PHYSICAL THERAPY | Age: 48
End: 2023-05-04
Payer: OTHER GOVERNMENT

## 2023-07-03 RX ORDER — CLONIDINE HYDROCHLORIDE 0.3 MG/1
TABLET ORAL
Qty: 180 TABLET | Refills: 1 | Status: SHIPPED | OUTPATIENT
Start: 2023-07-03

## 2023-07-20 NOTE — FLOWSHEET NOTE
[facility], 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  Insurance/Certification information:  PT Insurance Information: [employer]  Physician Information:   Willa Mirza MD  Plan of care signed (Y/N):  sent  Visit# / total visits:       G-Code (if applicable):           ERNIE: 50% disability at eval      Time in:   10:30      Timed Treatment: 10  Total Treatment Time:  45  Time out: 11:15  ________________________________________________________________________________________    Pain Level:    5/10  SUBJECTIVE:  see eval    OBJECTIVE: R PI    Exercise/Equipment Resistance/Repetitions Other comments                                                                                                                                             Other Therapeutic Activities:  eval completed, pool tour, HEP issued and practiced    HEP:  APT/PPT, bridge, clamshells, quadruped C-stretch, quadruped multifidus lift, self-MET for R PI    Manual Treatments:         Modalities:      Test/Measurements:         ASSESSMENT:         Treatment/Activity Tolerance:   [x]Patient tolerated treatment well [] Patient limited by fatique  []Patient limited by pain [] Patient limited by other medical complications  [] Other:     Goals:      Short term goal 1: Pt will be independent and compliant with HEP  Short term goal 2: Pt will decrease pain by 50% in frequency or intensity  Short term goal 3: Pt will increase B hip strength to 5/5 grossly  Short term goal 4: Pt will rick 30 mins of standing/ sitting  LTG 1: Pt will decrease pain by % in frequency or Statement Selected

## 2023-08-12 DIAGNOSIS — G47.00 INSOMNIA, UNSPECIFIED TYPE: ICD-10-CM

## 2023-08-15 RX ORDER — HYDROXYZINE HYDROCHLORIDE 25 MG/1
TABLET, FILM COATED ORAL
Qty: 90 TABLET | Refills: 1 | Status: SHIPPED | OUTPATIENT
Start: 2023-08-15

## 2023-09-16 DIAGNOSIS — K21.9 GASTROESOPHAGEAL REFLUX DISEASE, UNSPECIFIED WHETHER ESOPHAGITIS PRESENT: ICD-10-CM

## 2023-09-18 RX ORDER — OMEPRAZOLE 40 MG/1
CAPSULE, DELAYED RELEASE ORAL
Qty: 90 CAPSULE | Refills: 1 | OUTPATIENT
Start: 2023-09-18

## 2023-11-07 RX ORDER — CLONIDINE HYDROCHLORIDE 0.3 MG/1
TABLET ORAL
Qty: 180 TABLET | Refills: 1 | Status: SHIPPED | OUTPATIENT
Start: 2023-11-07

## 2023-11-14 RX ORDER — BUPROPION HYDROCHLORIDE 150 MG/1
150 TABLET ORAL EVERY MORNING
Qty: 90 TABLET | Refills: 1 | Status: SHIPPED | OUTPATIENT
Start: 2023-11-14

## 2024-04-29 RX ORDER — CLONIDINE HYDROCHLORIDE 0.3 MG/1
TABLET ORAL
Qty: 180 TABLET | Refills: 1 | Status: SHIPPED | OUTPATIENT
Start: 2024-04-29

## 2024-08-23 RX ORDER — CLONIDINE HYDROCHLORIDE 0.3 MG/1
TABLET ORAL
Qty: 180 TABLET | Refills: 1 | OUTPATIENT
Start: 2024-08-23

## 2024-09-12 NOTE — TELEPHONE ENCOUNTER
[employer] message sent
Pt would like a PT referral for back pain
ordered
[Time Spent: ___ minutes] : I have spent [unfilled] minutes of time on the encounter which excludes teaching and separately reported services.